# Patient Record
Sex: FEMALE | Race: BLACK OR AFRICAN AMERICAN | NOT HISPANIC OR LATINO | Employment: FULL TIME | URBAN - METROPOLITAN AREA
[De-identification: names, ages, dates, MRNs, and addresses within clinical notes are randomized per-mention and may not be internally consistent; named-entity substitution may affect disease eponyms.]

---

## 2017-03-10 ENCOUNTER — LAB VISIT (OUTPATIENT)
Dept: LAB | Facility: HOSPITAL | Age: 47
End: 2017-03-10
Attending: FAMILY MEDICINE
Payer: COMMERCIAL

## 2017-03-10 ENCOUNTER — OFFICE VISIT (OUTPATIENT)
Dept: FAMILY MEDICINE | Facility: CLINIC | Age: 47
End: 2017-03-10
Payer: COMMERCIAL

## 2017-03-10 VITALS
RESPIRATION RATE: 18 BRPM | OXYGEN SATURATION: 98 % | WEIGHT: 293 LBS | HEART RATE: 88 BPM | HEIGHT: 65 IN | SYSTOLIC BLOOD PRESSURE: 128 MMHG | DIASTOLIC BLOOD PRESSURE: 86 MMHG | BODY MASS INDEX: 48.82 KG/M2 | TEMPERATURE: 97 F

## 2017-03-10 DIAGNOSIS — M79.18 MUSCULOSKELETAL PAIN: ICD-10-CM

## 2017-03-10 DIAGNOSIS — I10 ESSENTIAL HYPERTENSION: ICD-10-CM

## 2017-03-10 DIAGNOSIS — Z86.711 HISTORY OF PULMONARY EMBOLISM: ICD-10-CM

## 2017-03-10 DIAGNOSIS — Z00.00 ANNUAL PHYSICAL EXAM: Primary | ICD-10-CM

## 2017-03-10 DIAGNOSIS — E66.01 MORBID OBESITY WITH BMI OF 50.0-59.9, ADULT: ICD-10-CM

## 2017-03-10 DIAGNOSIS — M47.9 ARTHRITIS OF BACK: ICD-10-CM

## 2017-03-10 DIAGNOSIS — Z00.00 ANNUAL PHYSICAL EXAM: ICD-10-CM

## 2017-03-10 DIAGNOSIS — D50.9 IRON DEFICIENCY ANEMIA, UNSPECIFIED IRON DEFICIENCY ANEMIA TYPE: ICD-10-CM

## 2017-03-10 LAB
25(OH)D3+25(OH)D2 SERPL-MCNC: <8 NG/ML
ALBUMIN SERPL BCP-MCNC: 3 G/DL
ALP SERPL-CCNC: 82 U/L
ALT SERPL W/O P-5'-P-CCNC: 11 U/L
ANION GAP SERPL CALC-SCNC: 10 MMOL/L
AST SERPL-CCNC: 14 U/L
BASOPHILS # BLD AUTO: 0.01 K/UL
BASOPHILS NFR BLD: 0.2 %
BILIRUB SERPL-MCNC: 0.3 MG/DL
BILIRUB UR QL STRIP: NEGATIVE
BUN SERPL-MCNC: 10 MG/DL
CALCIUM SERPL-MCNC: 9.7 MG/DL
CHLORIDE SERPL-SCNC: 105 MMOL/L
CHOLEST/HDLC SERPL: 3.1 {RATIO}
CLARITY UR REFRACT.AUTO: CLEAR
CO2 SERPL-SCNC: 26 MMOL/L
COLOR UR AUTO: YELLOW
CREAT SERPL-MCNC: 0.8 MG/DL
DIFFERENTIAL METHOD: ABNORMAL
EOSINOPHIL # BLD AUTO: 0 K/UL
EOSINOPHIL NFR BLD: 0.5 %
ERYTHROCYTE [DISTWIDTH] IN BLOOD BY AUTOMATED COUNT: 16.1 %
EST. GFR  (AFRICAN AMERICAN): >60 ML/MIN/1.73 M^2
EST. GFR  (NON AFRICAN AMERICAN): >60 ML/MIN/1.73 M^2
FERRITIN SERPL-MCNC: 34 NG/ML
GLUCOSE SERPL-MCNC: 73 MG/DL
GLUCOSE UR QL STRIP: NEGATIVE
HCT VFR BLD AUTO: 38.4 %
HDL/CHOLESTEROL RATIO: 32.2 %
HDLC SERPL-MCNC: 171 MG/DL
HDLC SERPL-MCNC: 55 MG/DL
HGB BLD-MCNC: 11.7 G/DL
HGB UR QL STRIP: NEGATIVE
KETONES UR QL STRIP: NEGATIVE
LDLC SERPL CALC-MCNC: 103.2 MG/DL
LEUKOCYTE ESTERASE UR QL STRIP: NEGATIVE
LYMPHOCYTES # BLD AUTO: 1.7 K/UL
LYMPHOCYTES NFR BLD: 30.6 %
MCH RBC QN AUTO: 26.3 PG
MCHC RBC AUTO-ENTMCNC: 30.5 %
MCV RBC AUTO: 86 FL
MONOCYTES # BLD AUTO: 0.5 K/UL
MONOCYTES NFR BLD: 8.3 %
NEUTROPHILS # BLD AUTO: 3.4 K/UL
NEUTROPHILS NFR BLD: 60.2 %
NITRITE UR QL STRIP: NEGATIVE
NONHDLC SERPL-MCNC: 116 MG/DL
PH UR STRIP: 6 [PH] (ref 5–8)
PLATELET # BLD AUTO: 436 K/UL
PMV BLD AUTO: 10.5 FL
POTASSIUM SERPL-SCNC: 4.3 MMOL/L
PROT SERPL-MCNC: 8 G/DL
PROT UR QL STRIP: NEGATIVE
RBC # BLD AUTO: 4.45 M/UL
SODIUM SERPL-SCNC: 141 MMOL/L
SP GR UR STRIP: 1.01 (ref 1–1.03)
TRIGL SERPL-MCNC: 64 MG/DL
TSH SERPL DL<=0.005 MIU/L-ACNC: 1.09 UIU/ML
URN SPEC COLLECT METH UR: NORMAL
UROBILINOGEN UR STRIP-ACNC: NEGATIVE EU/DL
WBC # BLD AUTO: 5.56 K/UL

## 2017-03-10 PROCEDURE — 3074F SYST BP LT 130 MM HG: CPT | Mod: S$GLB,,, | Performed by: FAMILY MEDICINE

## 2017-03-10 PROCEDURE — 99999 PR PBB SHADOW E&M-EST. PATIENT-LVL III: CPT | Mod: PBBFAC,,, | Performed by: FAMILY MEDICINE

## 2017-03-10 PROCEDURE — 84443 ASSAY THYROID STIM HORMONE: CPT

## 2017-03-10 PROCEDURE — 81003 URINALYSIS AUTO W/O SCOPE: CPT

## 2017-03-10 PROCEDURE — 82728 ASSAY OF FERRITIN: CPT

## 2017-03-10 PROCEDURE — 85025 COMPLETE CBC W/AUTO DIFF WBC: CPT

## 2017-03-10 PROCEDURE — 82306 VITAMIN D 25 HYDROXY: CPT

## 2017-03-10 PROCEDURE — 36415 COLL VENOUS BLD VENIPUNCTURE: CPT | Mod: PO

## 2017-03-10 PROCEDURE — 3079F DIAST BP 80-89 MM HG: CPT | Mod: S$GLB,,, | Performed by: FAMILY MEDICINE

## 2017-03-10 PROCEDURE — 80053 COMPREHEN METABOLIC PANEL: CPT

## 2017-03-10 PROCEDURE — 99396 PREV VISIT EST AGE 40-64: CPT | Mod: S$GLB,,, | Performed by: FAMILY MEDICINE

## 2017-03-10 PROCEDURE — 80061 LIPID PANEL: CPT

## 2017-03-10 NOTE — PROGRESS NOTES
HISTORY OF PRESENT ILLNESS: Ms. Ovalle comes in today fasting and with taking medications for annual wellness examination.     END OF LIFE DECISION: She has a living will and does desire life support.     Current Outpatient Prescriptions   Medication Sig    aspirin (ECOTRIN) 81 MG EC tablet Take 81 mg by mouth once daily.    cetirizine (ZYRTEC) 10 MG tablet Take 10 mg by mouth daily as needed.     FE C 100-250 mg Tab TAKE 1 TABLET BY MOUTH ONCE DAILY    fluticasone (FLONASE) 50 mcg/actuation nasal spray 2 sprays by Each Nare route once daily. (Patient taking differently: 2 sprays by Each Nare route daily as needed. )    ibuprofen (ADVIL,MOTRIN) 800 MG tablet Take 1 tablet (800 mg total) by mouth every 8 (eight) hours as needed for Pain. For 12 days    losartan (COZAAR) 50 MG tablet Take 1 tablet (50 mg total) by mouth once daily.     SCREENINGS:   Cholesterol: March 10, 2016.  FFS/Colonoscopy: Never.  Mammogram: April 2016 with GYN Dr. Kiesha henrandez per patient. Scheduled for April 2017.  GYN Exam: April 2016 with GYN Dr. Kiesha hernandez per patient. Scheduled for April 2017.  Dexa Scan: Never.  Eye Exam: February 2017 with Dr. Jones per patient. She wears glasses.  PPD: Negative in the past per patient.  Immunizations: Tdap - March 4, 2015.  Gardasil - N./A.  Zostavax - N./A.  Pneumovax - In the past.  Seasonal Flu - November 8, 2016.     ROS:  GENERAL: Denies fever, chills or unusual weight change. Appetite normal. Exercises little but reports cutting back on eating as busy. Weight 156.9 kg (345 lb 14.4 oz) at November 8, 2016 visit. Reports always with fatigue but reports some non-compliance with iron therapy and works a lot of hours.  SKIN: Denies rashes, itching, changes in mole, color or texture of skin or easy bruising.  HEAD: Denies headaches or recent head trauma.  EYES: Denies change in vision, pain, diplopia, redness or discharge. Wears glasses.  EARS: Denies ear pain, discharge, vertigo or  "decreased hearing.  NOSE: Denies loss of smell, epistaxis or rhinitis.  MOUTH & THROAT: Denies hoarseness or change in voice. Denies excessive gum bleeding or mouth sores. Denies sore throat. Except reports chapped lips but recently used new lipstick; uses Neosporin lip balm with help.  NODES: Denies swollen glands.  CHEST: Denies ROSARIO, wheezing, cough or sputum production. Reports no longer follows with Dr. Mata, pulmonologist, for surveillance of pulmonary embolism and last saw him 2013.   CARDIOVASCULAR: Denies chest pain, PND, orthopnea or reduced exercise tolerance. Reports rare palpitations but reports cutting back some on caffeine.  ABDOMEN: Denies unusual diarrhea (as reports occasionally following gall bladder removal > 10 years ago), constipation, nausea, vomiting, abdominal pain, or blood in stool.  URINARY: Denies flank pain, dysuria or hematuria.  GENITOURINARY: Denies flank pain, dysuria, frequency or hematuria. No change in menses. Occasionally performs monthly breast self examination.   ENDOCRINE: Denies diabetes, thyroid or cholesterol problems.  HEME/LYMPH: Denies bleeding problems. Follows with Dr. Pascual, hematologist, prn with last visit > 1 year ago for surveillance of Lupus anticoagulant disorder; she is now only on EC ASA 81 mg daily since Xarelto was discontinued April 2014.  PERIPHERAL VASCULAR:Denies claudication or cyanosis.  MUSCULOSKELETAL: Denies joint stiffness, pain or swelling except reports intermittent non-traumatic pain at both of knees and at back but reports back pain helped with massages. Denies edema.  NEUROLOGIC: Denies history of seizures, tremors, paralysis, alteration of gait or coordination.  PSYCHIATRIC: Denies mood swings, depression, anxiety, homicidal or suicidal thoughts. Denies sleep problems.    PE:   VS: /86 (BP Location: Left arm, Patient Position: Sitting, BP Method: Manual)  Pulse 88  Temp 97.1 °F (36.2 °C) (Tympanic)   Resp 18  Ht 5' 5" (1.651 " m)  Wt (!) 154.5 kg (340 lb 9.8 oz)  LMP 03/03/2017 (Approximate) Comment: Monthly  SpO2 98%  BMI 56.68 kg/m2  APPEARANCE: Well nourished, well developed female, obese and pleasant, alert and oriented in no acute distress.   HEAD: Non tender. Full range of motion.  EYES: PERRL, conjunctiva pink, lids no edema.  EARS: External canal patent, no swelling or redness. TM's shiny and clear.  NOSE: Mucosa and turbinates pink, not swollen. No discharge. Non tender sinuses.  THROAT: No pharyngeal erythema or exudate. No stridor.   NECK: Supple, no mass, thyroid not enlarged.  NODES: No cervical lymph node enlargement.  CHEST: Normal respiratory effort. Lungs clear to auscultation.  CARDIOVASCULAR: Normal S1, S2. No rubs, murmurs or gallops. PMI not displaced. No carotid bruit. Pedal pulses palpable bilaterally. No edema.  ABDOMEN: Bowel sounds present. Not distended. Soft. No tenderness, masses or organomegaly.  BREAST EXAM: Not examined but deferred to GYN.  PELVIC EXAM: Not examined but deferred to GYN.  RECTAL EXAM: Not examined.  MUSCULOSKELETAL: No joint deformities or stiffness. She is ambulatory without problems.   SKIN: No rashes or suspicious lesions, normal color and turgor.  NEUROLOGIC: Cranial Nerves: II-XII grossly intact. DTR's: Knees, Ankles 2+ and equal bilaterally. Gait & Posture: Normal gait and fine motion.    ASSESSMENT:    ICD-10-CM ICD-9-CM    1. Annual physical exam Z00.00 V70.0 CBC auto differential      TSH      Urinalysis      Comprehensive metabolic panel      Lipid panel      Ferritin      Vitamin D   2. Essential hypertension I10 401.9    3. Iron deficiency anemia, unspecified iron deficiency anemia type D50.9 280.9    4. Arthritis of back M47.9 721.90    5. Musculoskeletal pain M79.1 729.1    6. History of pulmonary embolism Z86.711 V12.55    7. Morbid obesity with BMI of 50.0-59.9, adult E66.01 278.01     Z68.43 V85.43        PLAN:  1. Age-appropriate counseling-appropriate low-sodium,  low-cholesterol, low carbohydrate diet and exercise daily, monthly breast self exam, annual wellness examination.  2. Patient advised to correspond via My Chart for result.  3. Continue current medications.  4. Reassurance regarding pains; continue current symptomatic treatment.  5. See me in 6 months for hypertension follow up.

## 2017-03-10 NOTE — MR AVS SNAPSHOT
[facility]  [address]  [place] [address]  Phone: [phone]                  Jenna Ovalle   3/10/2017 7:30 AM   Office Visit    Description:  Female : 1970   Provider:  Franca Richmond MD   Department:  [facility]           Reason for Visit     Annual Exam           Diagnoses this Visit        Comments    Annual physical exam    -  Primary     Essential hypertension         Iron deficiency anemia, unspecified iron deficiency anemia type         Arthritis of back         Musculoskeletal pain         History of pulmonary embolism         Morbid obesity with BMI of 50.0-59.9, adult                To Do List           Future Appointments        Provider Department Dept Phone    3/10/2017 8:40 AM LABORATORY, [address] [facility] [phone]    2017 7:30 AM Franca Richmond MD [facility] [phone]      Goals (5 Years of Data)     None      Follow-Up and Disposition     Return in about 6 months (around 2017) for blood pressure follow up.      [facility]     [facility] Nurse Care Line -  Assistance  Registered nurses in the [facility] provide clinical advisement, health education, appointment booking, and other advisory services.  Call for this free service at [phone].             Medications           Message regarding Medications     Verify the changes and/or additions to your medication regime listed below are the same as discussed with your clinician today.  If any of these changes or additions are incorrect, please notify your healthcare provider.             Verify that the below list of medications is an accurate representation of the medications you are currently taking.  If none reported, the list may be blank. If incorrect, please contact your healthcare provider. Carry this list with you in case of emergency.           Current  "Medications     aspirin (ECOTRIN) 81 MG EC tablet Take 81 mg by mouth once daily.    cetirizine (ZYRTEC) 10 MG tablet Take 10 mg by mouth daily as needed.     FE C 100-250 mg Tab TAKE 1 TABLET BY MOUTH ONCE DAILY    fluticasone (FLONASE) 50 mcg/actuation nasal spray 2 sprays by Each Nare route once daily.    ibuprofen (ADVIL,MOTRIN) 800 MG tablet Take 1 tablet (800 mg total) by mouth every 8 (eight) hours as needed for Pain. For 12 days    losartan (COZAAR) 50 MG tablet Take 1 tablet (50 mg total) by mouth once daily.           Clinical Reference Information           Your Vitals Were     BP Pulse Temp Resp Height    128/86 (BP Location: Left arm, Patient Position: Sitting, BP Method: Manual) 88 97.1 °F (36.2 °C) (Tympanic) 18 5' 5" (1.651 m)    Weight Last Period SpO2 BMI    154.5 kg (340 lb 9.8 oz) 03/03/2017 (Approximate) 98% 56.68 kg/m2      Blood Pressure          Most Recent Value    BP  128/86      Allergies as of 3/10/2017     No Known Drug Allergies      Immunizations Administered on Date of Encounter - 3/10/2017     None      Orders Placed During Today's Visit      Normal Orders This Visit    Urinalysis     Future Labs/Procedures Expected by Expires    CBC auto differential  3/10/2017 5/9/2018    Comprehensive metabolic panel  3/10/2017 5/9/2018    Ferritin  3/10/2017 5/9/2018    Lipid panel  3/10/2017 5/9/2018    TSH  3/10/2017 5/9/2018    Vitamin D  3/10/2017 5/9/2018      Instructions    Please correspond with Dr. Richmond via My Chart for your results.     Thanks.       Language Assistance Services     ATTENTION: Language assistance services are available, free of charge. Please call [phone].      ATENCIÓN: Si habla español, tiene a lara disposición servicios gratuitos de asistencia lingüística. Llame al [phone].     CHÚ Ý: N?u b?n nói Ti?ng Vi?t, có các d?ch v? h? tr? ngôn ng? mi?n phí dành cho b?n. G?i s? [phone].         [facility] complies with applicable Federal " civil rights laws and does not discriminate on the basis of race, color, national origin, age, disability, or sex.

## 2017-03-20 ENCOUNTER — PATIENT MESSAGE (OUTPATIENT)
Dept: FAMILY MEDICINE | Facility: CLINIC | Age: 47
End: 2017-03-20

## 2017-03-21 RX ORDER — ERGOCALCIFEROL 1.25 MG/1
50000 CAPSULE ORAL
Qty: 4 CAPSULE | Refills: 2 | Status: SHIPPED | OUTPATIENT
Start: 2017-03-21 | End: 2017-07-03

## 2017-04-04 ENCOUNTER — OFFICE VISIT (OUTPATIENT)
Dept: FAMILY MEDICINE | Facility: CLINIC | Age: 47
End: 2017-04-04
Payer: COMMERCIAL

## 2017-04-04 VITALS
HEIGHT: 65 IN | WEIGHT: 293 LBS | BODY MASS INDEX: 48.82 KG/M2 | SYSTOLIC BLOOD PRESSURE: 132 MMHG | OXYGEN SATURATION: 96 % | HEART RATE: 101 BPM | DIASTOLIC BLOOD PRESSURE: 78 MMHG | TEMPERATURE: 98 F | RESPIRATION RATE: 18 BRPM

## 2017-04-04 DIAGNOSIS — J30.9 ALLERGIC RHINITIS, UNSPECIFIED: Primary | ICD-10-CM

## 2017-04-04 PROCEDURE — 3075F SYST BP GE 130 - 139MM HG: CPT | Mod: S$GLB,,, | Performed by: REGISTERED NURSE

## 2017-04-04 PROCEDURE — 99999 PR PBB SHADOW E&M-EST. PATIENT-LVL IV: CPT | Mod: PBBFAC,,, | Performed by: REGISTERED NURSE

## 2017-04-04 PROCEDURE — 3078F DIAST BP <80 MM HG: CPT | Mod: S$GLB,,, | Performed by: REGISTERED NURSE

## 2017-04-04 PROCEDURE — 99213 OFFICE O/P EST LOW 20 MIN: CPT | Mod: 25,S$GLB,, | Performed by: REGISTERED NURSE

## 2017-04-04 PROCEDURE — 1160F RVW MEDS BY RX/DR IN RCRD: CPT | Mod: S$GLB,,, | Performed by: REGISTERED NURSE

## 2017-04-04 PROCEDURE — 96372 THER/PROPH/DIAG INJ SC/IM: CPT | Mod: S$GLB,,, | Performed by: REGISTERED NURSE

## 2017-04-04 RX ORDER — BETAMETHASONE SODIUM PHOSPHATE AND BETAMETHASONE ACETATE 3; 3 MG/ML; MG/ML
12 INJECTION, SUSPENSION INTRA-ARTICULAR; INTRALESIONAL; INTRAMUSCULAR; SOFT TISSUE
Status: COMPLETED | OUTPATIENT
Start: 2017-04-04 | End: 2017-04-04

## 2017-04-04 RX ORDER — PROMETHAZINE HYDROCHLORIDE AND CODEINE PHOSPHATE 6.25; 1 MG/5ML; MG/5ML
5 SOLUTION ORAL NIGHTLY PRN
Qty: 118 ML | Refills: 0 | Status: SHIPPED | OUTPATIENT
Start: 2017-04-04 | End: 2017-07-03

## 2017-04-04 RX ADMIN — BETAMETHASONE SODIUM PHOSPHATE AND BETAMETHASONE ACETATE 12 MG: 3; 3 INJECTION, SUSPENSION INTRA-ARTICULAR; INTRALESIONAL; INTRAMUSCULAR; SOFT TISSUE at 05:04

## 2017-04-04 NOTE — MR AVS SNAPSHOT
[facility]  [address] [address]  Phone: [phone]                  Jenna Ovalle   2017 4:45 PM   Office Visit    Description:  Female : 1970   Provider:  Ankur De Leon NP   Department:  [facility]           Reason for Visit     Sinus Problem           Diagnoses this Visit        Comments    Allergic rhinitis, unspecified    -  Primary            To Do List           Future Appointments        Provider Department Dept Phone    2017 7:30 AM Franca Richmond MD [facility] [phone]      Goals (5 Years of Data)     None       These Medications        Disp Refills Start End    promethazine-codeine 6.25-10 mg/5 ml (PHENERGAN WITH CODEINE) 6.25-10 mg/5 mL syrup 118 mL 0 2017     Take 5 mLs by mouth nightly as needed for Cough. - Oral    Pharmacy: [facility] [address] [place] [address] RAI [address] AT [facility] Ph #: [phone]         [facility] On Call     [facility] On Call Nurse Care Line - 24 Assistance  Unless otherwise directed by your provider, please contact [facility], our nurse care line that is available for  assistance.     Registered nurses in the [facility] provide: appointment scheduling, clinical advisement, health education, and other advisory services.  Call: [phone] (toll free)               Medications           Message regarding Medications     Verify the changes and/or additions to your medication regime listed below are the same as discussed with your clinician today.  If any of these changes or additions are incorrect, please notify your healthcare provider.        START taking these NEW medications        Refills    promethazine-codeine 6.25-10 mg/5 ml (PHENERGAN WITH CODEINE) 6.25-10 mg/5 mL syrup 0    Sig: Take 5 mLs by mouth nightly as needed for Cough.    Class: Print    Route: Oral      These  "medications were administered today        Dose Freq    betamethasone acetate-betamethasone sodium phosphate injection 12 mg 12 mg Clinic/HOD 1 time    Sig: Inject 2 mLs (12 mg total) into the muscle one time.    Class: Normal    Route: Intramuscular           Verify that the below list of medications is an accurate representation of the medications you are currently taking.  If none reported, the list may be blank. If incorrect, please contact your healthcare provider. Carry this list with you in case of emergency.           Current Medications     aspirin (ECOTRIN) 81 MG EC tablet Take 81 mg by mouth once daily.    cetirizine (ZYRTEC) 10 MG tablet Take 10 mg by mouth daily as needed.     ergocalciferol (ERGOCALCIFEROL) 50,000 unit Cap Take 1 capsule (50,000 Units total) by mouth every 7 days.    FE C 100-250 mg Tab TAKE 1 TABLET BY MOUTH ONCE DAILY    fluticasone (FLONASE) 50 mcg/actuation nasal spray 2 sprays by Each Nare route once daily.    ibuprofen (ADVIL,MOTRIN) 800 MG tablet Take 1 tablet (800 mg total) by mouth every 8 (eight) hours as needed for Pain. For 12 days    losartan (COZAAR) 50 MG tablet Take 1 tablet (50 mg total) by mouth once daily.    promethazine-codeine 6.25-10 mg/5 ml (PHENERGAN WITH CODEINE) 6.25-10 mg/5 mL syrup Take 5 mLs by mouth nightly as needed for Cough.           Clinical Reference Information           Your Vitals Were     BP Pulse Temp Resp Height    132/78 (BP Location: Left arm, Patient Position: Sitting, BP Method: Manual) 101 98.2 °F (36.8 °C) (Tympanic) 18 5' 5" (1.651 m)    Weight Last Period SpO2 BMI    155.7 kg (343 lb 4.1 oz) 03/03/2017 (Approximate) 96% 57.12 kg/m2      Blood Pressure          Most Recent Value    BP  132/78      Allergies as of 4/4/2017     No Known Drug Allergies      Immunizations Administered on Date of Encounter - 4/4/2017     None      Language Assistance Services     ATTENTION: Language assistance services are available, free of charge. Please " call [phone].      ATENCIÓN: Si habla español, tiene a lara disposición servicios gratuitos de asistencia lingüística. Llame al [phone].     CHÚ Ý: N?u b?n nói Ti?ng Vi?t, có các d?ch v? h? tr? ngôn ng? mi?n phí dành cho b?n. G?i s? [phone].         [facility] complies with applicable Federal civil rights laws and does not discriminate on the basis of race, color, national origin, age, disability, or sex.

## 2017-04-05 NOTE — PROGRESS NOTES
"Subjective:       Patient ID: Jenna Ovalle is a 46 y.o. female.    Chief Complaint: Sinus Problem    HPI     Jenna is here today with c/o sinus/allergy issues x 4 days.  She has been taking Zyrtec-D, Flonase and Tussin.  She is monitoring BP on Sudafed.  Reports dry cough (worse PM), sneezing, PND, and HA.    Review of Systems   Constitutional: Negative for chills and fever.   HENT: Positive for congestion, postnasal drip, rhinorrhea and sneezing. Negative for sinus pressure.    Eyes: Negative.    Respiratory: Positive for cough. Negative for shortness of breath and wheezing.    Cardiovascular: Negative.    Allergic/Immunologic: Positive for environmental allergies.   Neurological: Positive for headaches. Negative for weakness, light-headedness and numbness.   Hematological: Negative for adenopathy.       Objective:       Vitals:    04/04/17 1655   BP: 132/78   BP Location: Left arm   Patient Position: Sitting   BP Method: Manual   Pulse: 101   Resp: 18   Temp: 98.2 °F (36.8 °C)   TempSrc: Tympanic   SpO2: 96%   Weight: (!) 155.7 kg (343 lb 4.1 oz)   Height: 5' 5" (1.651 m)       Physical Exam   Constitutional: She is oriented to person, place, and time. She appears well-developed and well-nourished.   HENT:   Head: Normocephalic and atraumatic.   Right Ear: Tympanic membrane, external ear and ear canal normal.   Left Ear: Tympanic membrane, external ear and ear canal normal.   Nose: Mucosal edema and rhinorrhea (boggy, clear RN) present. No epistaxis. Right sinus exhibits no maxillary sinus tenderness and no frontal sinus tenderness. Left sinus exhibits no maxillary sinus tenderness and no frontal sinus tenderness.   Mouth/Throat: Mucous membranes are normal. No oropharyngeal exudate, posterior oropharyngeal edema or posterior oropharyngeal erythema (clear PND noted).   Eyes: Conjunctivae are normal. Pupils are equal, round, and reactive to light. Right eye exhibits no discharge. Left eye exhibits no " discharge. No scleral icterus.   Cardiovascular: Normal rate, regular rhythm and normal heart sounds.    Pulmonary/Chest: Effort normal and breath sounds normal.   Lymphadenopathy:     She has no cervical adenopathy.   Neurological: She is alert and oriented to person, place, and time.   Vitals reviewed.      Assessment:       1. Allergic rhinitis, unspecified        Plan:       Jenna was seen today for sinus problem.    Diagnoses and all orders for this visit:    Allergic rhinitis, unspecified  -     betamethasone acetate-betamethasone sodium phosphate injection 12 mg; Inject 2 mLs (12 mg total) into the muscle one time.  -     promethazine-codeine 6.25-10 mg/5 ml (PHENERGAN WITH CODEINE) 6.25-10 mg/5 mL syrup; Take 5 mLs by mouth nightly as needed for Cough.        May continue Zyrtec-D if feels effective with close BP checks.  Symptomatic care, rest, fluids, hydration.  Follow-up in clinic as needed.

## 2017-04-17 RX ORDER — IRON,CARBONYL/ASCORBIC ACID 100-250 MG
TABLET ORAL
Qty: 30 TABLET | Refills: 5 | Status: SHIPPED | OUTPATIENT
Start: 2017-04-17 | End: 2017-12-08 | Stop reason: SDUPTHER

## 2017-04-19 RX ORDER — LOSARTAN POTASSIUM 50 MG/1
50 TABLET ORAL DAILY
Qty: 90 TABLET | Refills: 1 | Status: SHIPPED | OUTPATIENT
Start: 2017-04-19 | End: 2017-10-14 | Stop reason: SDUPTHER

## 2017-07-03 ENCOUNTER — OFFICE VISIT (OUTPATIENT)
Dept: FAMILY MEDICINE | Facility: CLINIC | Age: 47
End: 2017-07-03
Payer: COMMERCIAL

## 2017-07-03 VITALS
BODY MASS INDEX: 48.82 KG/M2 | RESPIRATION RATE: 18 BRPM | DIASTOLIC BLOOD PRESSURE: 79 MMHG | TEMPERATURE: 96 F | HEIGHT: 65 IN | HEART RATE: 55 BPM | SYSTOLIC BLOOD PRESSURE: 137 MMHG | OXYGEN SATURATION: 97 % | WEIGHT: 293 LBS

## 2017-07-03 DIAGNOSIS — N95.1 PERIMENOPAUSAL: ICD-10-CM

## 2017-07-03 DIAGNOSIS — M47.9 ARTHRITIS OF BACK: ICD-10-CM

## 2017-07-03 DIAGNOSIS — R10.9 FLANK PAIN: Primary | ICD-10-CM

## 2017-07-03 LAB
BILIRUB SERPL-MCNC: NEGATIVE MG/DL
BLOOD URINE, POC: 250
COLOR, POC UA: YELLOW
GLUCOSE UR QL STRIP: NEGATIVE
KETONES UR QL STRIP: NEGATIVE
LEUKOCYTE ESTERASE URINE, POC: NEGATIVE
NITRITE, POC UA: NEGATIVE
PH, POC UA: 6
PROTEIN, POC: NEGATIVE
SPECIFIC GRAVITY, POC UA: 1.01
UROBILINOGEN, POC UA: NEGATIVE

## 2017-07-03 PROCEDURE — 87086 URINE CULTURE/COLONY COUNT: CPT

## 2017-07-03 PROCEDURE — 99214 OFFICE O/P EST MOD 30 MIN: CPT | Mod: 25,S$GLB,, | Performed by: FAMILY MEDICINE

## 2017-07-03 PROCEDURE — 81002 URINALYSIS NONAUTO W/O SCOPE: CPT | Mod: S$GLB,,, | Performed by: FAMILY MEDICINE

## 2017-07-03 PROCEDURE — 99999 PR PBB SHADOW E&M-EST. PATIENT-LVL III: CPT | Mod: PBBFAC,,, | Performed by: FAMILY MEDICINE

## 2017-07-03 RX ORDER — ERGOCALCIFEROL 1.25 MG/1
50000 CAPSULE ORAL
Qty: 4 CAPSULE | Refills: 2 | Status: SHIPPED | OUTPATIENT
Start: 2017-07-03 | End: 2017-09-17 | Stop reason: SDUPTHER

## 2017-07-03 NOTE — PROGRESS NOTES
Subjective:       Patient ID: Jenna Ovalle is a 47 y.o. female.    Chief Complaint: Back Pain    Ms. Ovalle comes in today with complaint of having right back/flank pain for few days.  She states pain initially was intermittent but now constant since yesterday.  She reports pain was more so at her back last night.  She states she now has slight discomfort in her left flank and slight right abdominal pain.  She states she takes ibuprofen with help.  She reports pain at 3/10 on pain scale now.  She denies associated trauma, nausea, vomiting, diarrhea, constipation, chest pain, palpitations, leg swelling, shortness of breath, cough, wheezing, urine frequency, dysuria, hematuria, fever, chills, change of appetite.  However, she states she holds her urine when she can't make it to the restroom.  She has no history of UTI.    She reports chronic fatigue.  She states she currently does not take vitamin D weekly for treatment of vitamin D deficiency as she states she does not have refills.    She is perimenopausal but states she noticed slight blood from vaginal area last night without blood noted this morning.  She follows with GYN Dr. Kiesha Ellington and states she had Pap smear and mammogram performed on May 9, 2017.      Current Outpatient Prescriptions:  aspirin (ECOTRIN) 81 MG EC tablet, Take 81 mg by mouth once daily.  cetirizine (ZYRTEC) 10 MG tablet, Take 10 mg by mouth daily as needed.   FE C 100-250 mg Tab, TAKE 1 TABLET BY MOUTH EVERY DAY  fluticasone (FLONASE) 50 mcg/actuation nasal spray, 2 sprays by Each Nare route once daily. (Patient taking differently: 2 sprays by Each Nare route daily as needed. )  ibuprofen (ADVIL,MOTRIN) 800 MG tablet, Take 1 tablet (800 mg total) by mouth every 8 (eight) hours as needed for Pain. For 12 days  losartan (COZAAR) 50 MG tablet, Take 1 tablet (50 mg total) by mouth once daily.    Urine dipstick ordered by me today - 250 + blood, pH 6, specific gravity 1.010, otherwise  unremarkable.        Back Pain   Associated symptoms include abdominal pain. Pertinent negatives include no chest pain, dysuria or fever.     Review of Systems   Constitutional: Positive for fatigue. Negative for appetite change, chills and fever.   Respiratory: Negative for cough, shortness of breath and wheezing.    Cardiovascular: Negative for chest pain, palpitations and leg swelling.   Gastrointestinal: Positive for abdominal pain. Negative for constipation, diarrhea, nausea and vomiting.   Endocrine:        See history of present illness.   Genitourinary: Positive for flank pain. Negative for difficulty urinating, dysuria, frequency and hematuria.   Musculoskeletal: Positive for back pain.       Objective:      Physical Exam   Constitutional: She is oriented to person, place, and time. She appears well-developed and well-nourished. No distress.   Obese and pleasant.   Cardiovascular: Normal rate, regular rhythm, normal heart sounds and intact distal pulses.    No murmur heard.  Pulmonary/Chest: Effort normal and breath sounds normal. No respiratory distress. She has no wheezes.   Abdominal: Soft. Bowel sounds are normal. She exhibits no distension and no mass. There is tenderness. There is no rebound and no guarding.   Slightly tender at left upper, lower, flank quadrants without acute abdomen noted. No CVA tenderness noted.   Musculoskeletal: Normal range of motion. She exhibits tenderness. She exhibits no edema.   She is ambulatory without problems. Slightly tender at mid and left low back areas.   Neurological: She is alert and oriented to person, place, and time.   Skin: No rash noted. She is not diaphoretic.   Psychiatric: She has a normal mood and affect. Her behavior is normal. Judgment and thought content normal.   Vitals reviewed.      Assessment:       1. Flank pain    2. Perimenopausal    3. Arthritis of back        Plan:       1.  Labs:  Urine culture. Patient advised to call for results.  2.   Continue current medications, follow low sodium, low cholesterol, low carb diet, daily walks.  3.  Prescription refill - Vitamin D 50,000 units weekly, #4, 2 refills.  4.  Flu shot this fall.  5.  Keep 9/13/2017 scheduled appointment with me for hypertension follow up with vitamin D deficiency recheck.

## 2017-07-04 LAB
BACTERIA UR CULT: NORMAL
BACTERIA UR CULT: NORMAL

## 2017-07-05 ENCOUNTER — TELEPHONE (OUTPATIENT)
Dept: FAMILY MEDICINE | Facility: CLINIC | Age: 47
End: 2017-07-05

## 2017-07-05 NOTE — TELEPHONE ENCOUNTER
----- Message from Vernell Daley sent at 7/5/2017 11:25 AM CDT -----  Contact: pt  Pt request call concerning lab results, pt can be reached at [phone]///[phone]

## 2017-07-05 NOTE — TELEPHONE ENCOUNTER
Advise pt final urine culture does not show definite infection.  If symptoms persist into next week without help with pain medication, please notify me. Thanks.

## 2017-07-05 NOTE — TELEPHONE ENCOUNTER
Pt notified and verbalized understanding.   Pt states she is going to call her GYN to see if maybe its a fibroid

## 2017-07-12 ENCOUNTER — LAB VISIT (OUTPATIENT)
Dept: LAB | Facility: HOSPITAL | Age: 47
End: 2017-07-12
Attending: INTERNAL MEDICINE
Payer: COMMERCIAL

## 2017-07-12 ENCOUNTER — OFFICE VISIT (OUTPATIENT)
Dept: ENDOCRINOLOGY | Facility: CLINIC | Age: 47
End: 2017-07-12
Payer: COMMERCIAL

## 2017-07-12 VITALS
HEIGHT: 65 IN | SYSTOLIC BLOOD PRESSURE: 128 MMHG | HEART RATE: 63 BPM | WEIGHT: 293 LBS | DIASTOLIC BLOOD PRESSURE: 82 MMHG | BODY MASS INDEX: 48.82 KG/M2 | TEMPERATURE: 98 F

## 2017-07-12 DIAGNOSIS — R73.03 PREDIABETES: ICD-10-CM

## 2017-07-12 DIAGNOSIS — N92.6 IRREGULAR PERIODS/MENSTRUAL CYCLES: ICD-10-CM

## 2017-07-12 DIAGNOSIS — L68.0 HIRSUTISM: Primary | ICD-10-CM

## 2017-07-12 DIAGNOSIS — L68.0 HIRSUTISM: ICD-10-CM

## 2017-07-12 LAB
ALBUMIN SERPL BCP-MCNC: 2.9 G/DL
ALP SERPL-CCNC: 82 U/L
ALT SERPL W/O P-5'-P-CCNC: 12 U/L
ANION GAP SERPL CALC-SCNC: 7 MMOL/L
AST SERPL-CCNC: 12 U/L
BILIRUB SERPL-MCNC: 0.2 MG/DL
BUN SERPL-MCNC: 11 MG/DL
CALCIUM SERPL-MCNC: 9 MG/DL
CHLORIDE SERPL-SCNC: 106 MMOL/L
CO2 SERPL-SCNC: 26 MMOL/L
CREAT SERPL-MCNC: 0.8 MG/DL
DHEA-S SERPL-MCNC: 54.8 UG/DL
EST. GFR  (AFRICAN AMERICAN): >60 ML/MIN/1.73 M^2
EST. GFR  (NON AFRICAN AMERICAN): >60 ML/MIN/1.73 M^2
GLUCOSE SERPL-MCNC: 101 MG/DL
POTASSIUM SERPL-SCNC: 4.4 MMOL/L
PROLACTIN SERPL IA-MCNC: 6.6 NG/ML
PROT SERPL-MCNC: 7.8 G/DL
SODIUM SERPL-SCNC: 139 MMOL/L
TESTOST SERPL-MCNC: 39 NG/DL

## 2017-07-12 PROCEDURE — 83036 HEMOGLOBIN GLYCOSYLATED A1C: CPT

## 2017-07-12 PROCEDURE — 84403 ASSAY OF TOTAL TESTOSTERONE: CPT

## 2017-07-12 PROCEDURE — 84146 ASSAY OF PROLACTIN: CPT

## 2017-07-12 PROCEDURE — 82627 DEHYDROEPIANDROSTERONE: CPT

## 2017-07-12 PROCEDURE — 84402 ASSAY OF FREE TESTOSTERONE: CPT

## 2017-07-12 PROCEDURE — 83498 ASY HYDROXYPROGESTERONE 17-D: CPT

## 2017-07-12 PROCEDURE — 99204 OFFICE O/P NEW MOD 45 MIN: CPT | Mod: S$GLB,,, | Performed by: INTERNAL MEDICINE

## 2017-07-12 PROCEDURE — 80053 COMPREHEN METABOLIC PANEL: CPT

## 2017-07-12 PROCEDURE — 99999 PR PBB SHADOW E&M-EST. PATIENT-LVL III: CPT | Mod: PBBFAC,,, | Performed by: INTERNAL MEDICINE

## 2017-07-12 PROCEDURE — 36415 COLL VENOUS BLD VENIPUNCTURE: CPT | Mod: PO

## 2017-07-12 RX ORDER — LEVOCETIRIZINE DIHYDROCHLORIDE 5 MG/1
TABLET, FILM COATED ORAL DAILY PRN
Refills: 6 | COMMUNITY
Start: 2017-04-05 | End: 2017-09-13

## 2017-07-12 NOTE — PROGRESS NOTES
Subjective:       Patient ID: Jenna Ovalle is a 47 y.o. female.  Patient is new to me    Records were reviewed      Chief Complaint: Hirsutism  Began years ago    Consultation requested by Aaareferral Self    HPI       Had laser rx  For 9 months which helps hair on chin but wants to see if hormonal issues      Saw gyn in May- may be premeripenousal, skipped months with cycle    Last NMP May    12 years ago dx with fibroids- had myomectomy, but returned, may get hysterectomy at end of year,  Periods use to be heavy and lasted 7 ydays, requires iron    Trouble losing weight    Weight stable    c 5.9 May 2016  Hx of Pulm embolism possibly due to BCP    Review of Systems   Constitutional: Positive for fatigue. Negative for appetite change, fever and unexpected weight change.   HENT: Negative for sore throat and trouble swallowing.    Eyes: Negative for visual disturbance.   Respiratory: Negative for shortness of breath and wheezing.    Cardiovascular: Negative for chest pain, palpitations and leg swelling.   Gastrointestinal: Negative for diarrhea, nausea and vomiting.        Has flank pain   Endocrine: Negative for cold intolerance, heat intolerance, polydipsia, polyphagia and polyuria.   Genitourinary: Positive for menstrual problem. Negative for difficulty urinating and dysuria.   Musculoskeletal: Negative for arthralgias and joint swelling.   Skin: Negative for rash.   Neurological: Negative for dizziness, weakness, numbness and headaches.   Psychiatric/Behavioral: Negative for confusion, dysphoric mood and sleep disturbance.       Objective:      Physical Exam   Constitutional: She is oriented to person, place, and time. No distress.   Obese female   HENT:   Head: Normocephalic and atraumatic.   Right Ear: External ear normal.   Left Ear: External ear normal.   Nose: Nose normal.   Mouth/Throat: Oropharynx is clear and moist. No oropharyngeal exudate.   Eyes: Conjunctivae and EOM are normal. Pupils  are equal, round, and reactive to light. No scleral icterus.   Neck: No JVD present. No tracheal deviation present. No thyromegaly present.   Cardiovascular: Normal rate, regular rhythm, normal heart sounds and intact distal pulses.  Exam reveals no gallop and no friction rub.    No murmur heard.  Pulmonary/Chest: Effort normal and breath sounds normal. No respiratory distress. She has no wheezes. She has no rales.   Abdominal: Soft. Bowel sounds are normal. She exhibits no distension and no mass. There is no tenderness. There is no rebound and no guarding. No hernia.   Musculoskeletal: She exhibits no edema or deformity.   Lymphadenopathy:     She has no cervical adenopathy.   Neurological: She is alert and oriented to person, place, and time. She has normal reflexes. No cranial nerve deficit.   Skin: Skin is warm. No rash noted. She is diaphoretic. No erythema.   Psychiatric: She has a normal mood and affect. Her behavior is normal.   Vitals reviewed.        Lab Review:   Office Visit on 07/03/2017   Component Date Value    Color, UA 07/03/2017 yellow     Spec Grav UA 07/03/2017 1.010     pH, UA 07/03/2017 6     WBC, UA 07/03/2017 Negative     Nitrite, UA 07/03/2017 Negative     Protein 07/03/2017 Negative     Glucose, UA 07/03/2017 Negative     Ketones, UA 07/03/2017 Negative     Urobilinogen, UA 07/03/2017 Negative     Bilirubin 07/03/2017 Negative     Blood, UA 07/03/2017 250     Urine Culture, Routine 07/04/2017 Multiple organisms isolated. None in predominance.  Repeat if     Urine Culture, Routine 07/04/2017 clinically necessary.        Assessment:     1. Hirsutism  Testosterone    Testosterone, free    DHEA-sulfate    17-Hydroxyprogesterone    Prolactin   2. Irregular periods/menstrual cycles     3. Prediabetes  Hemoglobin A1c    Comprehensive metabolic panel      Plan:   Hirsutism  -     Testosterone; Future; Expected date: 07/12/2017  -     Testosterone, free; Future; Expected date:  07/12/2017  -     DHEA-sulfate; Future; Expected date: 07/12/2017  -     17-Hydroxyprogesterone; Future; Expected date: 07/12/2017  -     Prolactin; Future; Expected date: 07/12/2017    Irregular periods/menstrual cycles    Prediabetes  -     Hemoglobin A1c; Future; Expected date: 07/12/2017  -     Comprehensive metabolic panel; Future; Expected date: 07/12/2017      Will consider metformin if indicated and spironolacted if elev. Of androgens- avoid pregnancy with spironolactone  BCP contraindicated due to pulm. embolism  Return in about 3 months (around 10/12/2017).      This note is unavailable for viewing online. Certain specialties, including Behavioral Health and Pain Management, are currently not included in the open progress note program. For notes unavailable online, you can request a copy of your medical record.

## 2017-07-13 LAB
ESTIMATED AVG GLUCOSE: 123 MG/DL
HBA1C MFR BLD HPLC: 5.9 %

## 2017-07-14 LAB — TESTOST FREE SERPL-MCNC: 0.6 PG/ML

## 2017-07-17 LAB — 17OHP SERPL-MCNC: 41 NG/DL

## 2017-07-26 ENCOUNTER — PATIENT MESSAGE (OUTPATIENT)
Dept: ENDOCRINOLOGY | Facility: CLINIC | Age: 47
End: 2017-07-26

## 2017-07-26 RX ORDER — METFORMIN HYDROCHLORIDE 500 MG/1
TABLET, EXTENDED RELEASE ORAL
Qty: 60 TABLET | Refills: 3 | Status: SHIPPED | OUTPATIENT
Start: 2017-07-26 | End: 2018-02-07 | Stop reason: SDUPTHER

## 2017-07-31 ENCOUNTER — OFFICE VISIT (OUTPATIENT)
Dept: URGENT CARE | Facility: CLINIC | Age: 47
End: 2017-07-31
Payer: COMMERCIAL

## 2017-07-31 VITALS
WEIGHT: 293 LBS | HEART RATE: 72 BPM | TEMPERATURE: 98 F | HEIGHT: 65 IN | OXYGEN SATURATION: 98 % | SYSTOLIC BLOOD PRESSURE: 120 MMHG | DIASTOLIC BLOOD PRESSURE: 74 MMHG | BODY MASS INDEX: 48.82 KG/M2

## 2017-07-31 DIAGNOSIS — Z86.711 HISTORY OF PULMONARY EMBOLUS (PE): ICD-10-CM

## 2017-07-31 DIAGNOSIS — M25.561 ACUTE PAIN OF RIGHT KNEE: Primary | ICD-10-CM

## 2017-07-31 PROCEDURE — 99999 PR PBB SHADOW E&M-EST. PATIENT-LVL III: CPT | Mod: PBBFAC,,, | Performed by: NURSE PRACTITIONER

## 2017-07-31 PROCEDURE — 99499 UNLISTED E&M SERVICE: CPT | Mod: S$GLB,,, | Performed by: NURSE PRACTITIONER

## 2017-07-31 NOTE — PROGRESS NOTES
"Subjective:      Patient ID: Jenna Ovalle is a 47 y.o. female.    Chief Complaint: Knee Pain    Ms. Ovalle presents to Urgent Care today with complaints of right knee pain and swelling. She has a history of PE and wants to make sure she doesn't have a blood clot. She denies any shortness of breath. She has had swelling to the knee. No redness or warmth of the right lower leg. Pain is to the medial portion of the knee and she is a little tender also in the popliteal region. No recent trauma, however, Ms. Ovalle does admit to increased activity within the last week or so because her sister is getting  in 5 days.       Review of Systems   Respiratory: Negative for shortness of breath.    Cardiovascular: Positive for leg swelling (right). Negative for chest pain.   Musculoskeletal: Positive for arthralgias (right knee).       Objective:   /74   Pulse 72   Temp 97.8 °F (36.6 °C)   Ht 5' 5" (1.651 m)   Wt (!) 156 kg (343 lb 14.7 oz)   LMP 07/02/2017   SpO2 98%   BMI 57.23 kg/m²   Physical Exam   Constitutional:   Full exam deferred due to sending Ms. Ovalle to ER for further workup to rule out DVT.      Assessment:      1. Acute pain of right knee    2. History of pulmonary embolus (PE)       Plan:   Acute pain of right knee    History of pulmonary embolus (PE)    Explained to Ms. Ovalle that we do not have ultrasound here after 5 p.m. And when obtaining ultrasounds from an outpatient standpoint, we have to have them cleared by insurance and scheduled. Offered to have this done tomorrow with the risk of delaying diagnosis and treatment (possibly worsening condition and even death) or having Ms. Ovalle go to the ER tonight to have an ultrasound. She would like to go to the ER to have this done. Requests [facility] ER. Will call report. She declines ambulance transport.    "

## 2017-09-08 ENCOUNTER — NURSE TRIAGE (OUTPATIENT)
Dept: ADMINISTRATIVE | Facility: CLINIC | Age: 47
End: 2017-09-08

## 2017-09-08 NOTE — TELEPHONE ENCOUNTER
Reason for Disposition   Caller has medication question only, adult not sick, and triager answers question    Protocols used: ST MEDICATION QUESTION CALL-A-FAYE [place] skipped taking metformin yesterday because she took zantac and she read that it would interfere with metformin. She wants to take metformin tonight but is wondering if zantac would be out of her system by now. Advised it is safe for her to take her metformin dose tonight at this point. She has no further questions. Please contact caller with any further care advice.

## 2017-09-13 ENCOUNTER — OFFICE VISIT (OUTPATIENT)
Dept: FAMILY MEDICINE | Facility: CLINIC | Age: 47
End: 2017-09-13
Payer: COMMERCIAL

## 2017-09-13 ENCOUNTER — LAB VISIT (OUTPATIENT)
Dept: LAB | Facility: HOSPITAL | Age: 47
End: 2017-09-13
Attending: FAMILY MEDICINE
Payer: COMMERCIAL

## 2017-09-13 VITALS
OXYGEN SATURATION: 98 % | WEIGHT: 293 LBS | SYSTOLIC BLOOD PRESSURE: 126 MMHG | TEMPERATURE: 98 F | HEIGHT: 65 IN | HEART RATE: 77 BPM | DIASTOLIC BLOOD PRESSURE: 78 MMHG | RESPIRATION RATE: 18 BRPM | BODY MASS INDEX: 48.82 KG/M2

## 2017-09-13 DIAGNOSIS — E66.01 MORBID OBESITY WITH BMI OF 50.0-59.9, ADULT: ICD-10-CM

## 2017-09-13 DIAGNOSIS — Z86.711 HISTORY OF PULMONARY EMBOLISM: ICD-10-CM

## 2017-09-13 DIAGNOSIS — M17.10 ARTHRITIS OF KNEE: ICD-10-CM

## 2017-09-13 DIAGNOSIS — I10 ESSENTIAL HYPERTENSION: Primary | ICD-10-CM

## 2017-09-13 DIAGNOSIS — R73.03 PREDIABETES: ICD-10-CM

## 2017-09-13 DIAGNOSIS — I10 ESSENTIAL HYPERTENSION: ICD-10-CM

## 2017-09-13 LAB
ANION GAP SERPL CALC-SCNC: 11 MMOL/L
BUN SERPL-MCNC: 13 MG/DL
CALCIUM SERPL-MCNC: 9.3 MG/DL
CHLORIDE SERPL-SCNC: 104 MMOL/L
CO2 SERPL-SCNC: 25 MMOL/L
CREAT SERPL-MCNC: 0.7 MG/DL
EST. GFR  (AFRICAN AMERICAN): >60 ML/MIN/1.73 M^2
EST. GFR  (NON AFRICAN AMERICAN): >60 ML/MIN/1.73 M^2
GLUCOSE SERPL-MCNC: 97 MG/DL
POTASSIUM SERPL-SCNC: 3.7 MMOL/L
SODIUM SERPL-SCNC: 140 MMOL/L

## 2017-09-13 PROCEDURE — 3074F SYST BP LT 130 MM HG: CPT | Mod: S$GLB,,, | Performed by: FAMILY MEDICINE

## 2017-09-13 PROCEDURE — 99214 OFFICE O/P EST MOD 30 MIN: CPT | Mod: S$GLB,,, | Performed by: FAMILY MEDICINE

## 2017-09-13 PROCEDURE — 3078F DIAST BP <80 MM HG: CPT | Mod: S$GLB,,, | Performed by: FAMILY MEDICINE

## 2017-09-13 PROCEDURE — 3008F BODY MASS INDEX DOCD: CPT | Mod: S$GLB,,, | Performed by: FAMILY MEDICINE

## 2017-09-13 PROCEDURE — 80048 BASIC METABOLIC PNL TOTAL CA: CPT

## 2017-09-13 PROCEDURE — 36415 COLL VENOUS BLD VENIPUNCTURE: CPT | Mod: PO

## 2017-09-13 PROCEDURE — 99999 PR PBB SHADOW E&M-EST. PATIENT-LVL III: CPT | Mod: PBBFAC,,, | Performed by: FAMILY MEDICINE

## 2017-09-13 NOTE — PROGRESS NOTES
Subjective:       Patient ID: Jenna Ovalle is a 47 y.o. female.    Chief Complaint: Hypertension and Follow-up    Ms. Ovalle comes in today for 6-month hypertension follow-up.  She has taken blood pressure medication today.  She states she sometimes monitors her diet but has not been leisurely exercising.    She states she now follows with Dr. Bubba Quiñonez, orthopedist with [facility] with most recent visit on August 17, 2017 at which time she states he told her she has arthritis in her right knee by x-ray and advised she have water physical therapy which she states she has been receiving 2 times a week for 4 sessions.  She states she will have physical therapy for 6 weeks total and on October 5, 2017.  She states water therapy helps for her knee pain.  She states she alternates Motrin with Duexis for pain control.    She saw Dr. Booker, endocrinologist, on July 12, 2017 and was diagnosed with prediabetes and started on metformin 500 mg 2 pills daily with 3-month follow-up advised and scheduled for October 12, 2017.  She reports no problems with metformin therapy.    She follows with Dr. Pascual, hematologist, prn with last visit > 1 to 2 years ago for surveillance of Lupus anticoagulant disorder; she is now only on EC ASA 81 mg daily since Xarelto was discontinued April 2014.    She denies having fever, chills, fatigue, appetite change; shortness of breath, cough, wheezing; chest pain, palpitations, leg swelling; abdominal pain, nausea, vomiting, diarrhea, constipation; unusual urinary symptoms; back pain; headaches; anxiety or depression.    Current Outpatient Prescriptions:  aspirin (ECOTRIN) 81 MG EC tablet, Take 81 mg by mouth once daily.  cetirizine (ZYRTEC) 10 MG tablet, Take 10 mg by mouth daily as needed.   ergocalciferol (ERGOCALCIFEROL) 50,000 unit Cap, Take 1 capsule (50,000 Units total) by mouth every 7 days.  FE C 100-250 mg Tab, TAKE 1 TABLET BY MOUTH EVERY DAY  fluticasone  (FLONASE) 50 mcg/actuation nasal spray, 2 sprays by Each Nare route once daily. (Patient taking differently: 2 sprays by Each Nare route daily as needed. )  IBUPROFEN/FAMOTIDINE (DUEXIS ORAL), Take 800 mg by mouth daily as needed.   losartan (COZAAR) 50 MG tablet, Take 1 tablet (50 mg total) by mouth once daily.  metformin (GLUCOPHAGE-XR) 500 MG 24 hr tablet, Start with one tablet with dinner for one week;2nd week: Add 2nd tablet with dinner. (Patient taking differently: Take 500 mg by mouth 2 (two) times daily with meals. Start with one tablet with dinner for one week;2nd week: Add 2nd tablet with dinner.)    Labs:                     WBC                      5.56                03/10/2017                 HGB                      11.7 (L)            03/10/2017                 HCT                      38.4                03/10/2017                 PLT                      436 (H)             03/10/2017               LDLCALC                  103.2               03/10/2017                          CHOL                     171                 03/10/2017                 TRIG                     64                  03/10/2017                 HDL                      55                  03/10/2017                 ALT                      12                  07/12/2017                 AST                      12                  07/12/2017                 NA                       139                 07/12/2017                 K                        4.4                 07/12/2017                 CL                       106                 07/12/2017                 CREATININE               0.8                 07/12/2017                 BUN                      11                  07/12/2017                 CO2                      26                  07/12/2017                 TSH                      1.092               03/10/2017                 INR                      4.1 (H)             10/07/2010                  HGBA1C                   5.9 (H)             07/12/2017                  Review of Systems   Constitutional: Negative for activity change, appetite change, chills, fatigue and fever.        Weight 155 kg (341 lb 11.4 oz) at July 3, 2017 visit.   Respiratory: Negative for cough, shortness of breath and wheezing.    Cardiovascular: Negative for chest pain, palpitations and leg swelling.   Gastrointestinal: Negative for abdominal pain, constipation, diarrhea, nausea and vomiting.   Endocrine:        See history of present illness.   Genitourinary: Negative for difficulty urinating.   Musculoskeletal: Positive for arthralgias. Negative for back pain.        See history of present illness. Positive for right knee pain.   Neurological: Negative for headaches.   Hematological:        See history of present illness.   Psychiatric/Behavioral: Negative for dysphoric mood. The patient is not nervous/anxious.        Objective:      Physical Exam   Constitutional: She is oriented to person, place, and time. She appears well-developed and well-nourished. No distress.   Obese and pleasant.   Neck: Normal range of motion. Neck supple. No thyromegaly present.   Cardiovascular: Normal rate, regular rhythm, normal heart sounds and intact distal pulses.    No murmur heard.  Pulmonary/Chest: Effort normal and breath sounds normal. No respiratory distress. She has no wheezes.   Abdominal: Soft. Bowel sounds are normal. She exhibits no distension and no mass. There is no tenderness. There is no rebound and no guarding.   Musculoskeletal: Normal range of motion. She exhibits tenderness. She exhibits no edema.   She is ambulatory without problems. Slightly tender at inner right knee with full range of motion noted.   Lymphadenopathy:     She has no cervical adenopathy.   Neurological: She is alert and oriented to person, place, and time.   Skin: She is not diaphoretic.   Psychiatric: She has a normal mood and affect. Her behavior is normal.  Judgment and thought content normal.   Vitals reviewed.      Assessment:       1. Essential hypertension    2. Arthritis of knee    3. Prediabetes    4. History of pulmonary embolism    5. Morbid obesity with BMI of 50.0-59.9, adult        Plan:       1.  Lab: BMP.  Patient correspond via My Chart for results.  2.  Continue current medications, follow low sodium, low cholesterol, low carb diet, daily walks.  3.  Mobility impairment form renewed/completed per patient request.  4.  Keep follow up with specialists.  5.  Flu shot this fall.  6.  See me in March 2018 for fasting annual wellness examination.

## 2017-09-19 RX ORDER — ERGOCALCIFEROL 1.25 MG/1
CAPSULE ORAL
Qty: 4 CAPSULE | Refills: 5 | Status: SHIPPED | OUTPATIENT
Start: 2017-09-19 | End: 2018-03-10 | Stop reason: SDUPTHER

## 2017-10-12 ENCOUNTER — OFFICE VISIT (OUTPATIENT)
Dept: ENDOCRINOLOGY | Facility: CLINIC | Age: 47
End: 2017-10-12
Payer: COMMERCIAL

## 2017-10-12 ENCOUNTER — LAB VISIT (OUTPATIENT)
Dept: LAB | Facility: HOSPITAL | Age: 47
End: 2017-10-12
Attending: INTERNAL MEDICINE
Payer: COMMERCIAL

## 2017-10-12 VITALS
HEIGHT: 65 IN | WEIGHT: 293 LBS | DIASTOLIC BLOOD PRESSURE: 78 MMHG | SYSTOLIC BLOOD PRESSURE: 122 MMHG | HEART RATE: 61 BPM | TEMPERATURE: 96 F | BODY MASS INDEX: 48.82 KG/M2

## 2017-10-12 DIAGNOSIS — E66.01 MORBID OBESITY WITH BMI OF 50.0-59.9, ADULT: ICD-10-CM

## 2017-10-12 DIAGNOSIS — R73.03 PREDIABETES: ICD-10-CM

## 2017-10-12 DIAGNOSIS — R73.03 PREDIABETES: Primary | ICD-10-CM

## 2017-10-12 DIAGNOSIS — L68.0 HIRSUTISM: ICD-10-CM

## 2017-10-12 LAB
ESTIMATED AVG GLUCOSE: 126 MG/DL
HBA1C MFR BLD HPLC: 6 %

## 2017-10-12 PROCEDURE — 99214 OFFICE O/P EST MOD 30 MIN: CPT | Mod: S$GLB,,, | Performed by: INTERNAL MEDICINE

## 2017-10-12 PROCEDURE — 36415 COLL VENOUS BLD VENIPUNCTURE: CPT | Mod: PO

## 2017-10-12 PROCEDURE — 99999 PR PBB SHADOW E&M-EST. PATIENT-LVL III: CPT | Mod: PBBFAC,,, | Performed by: INTERNAL MEDICINE

## 2017-10-12 PROCEDURE — 83036 HEMOGLOBIN GLYCOSYLATED A1C: CPT

## 2017-10-12 NOTE — PROGRESS NOTES
""This note will be shared with the patient" Patient ID: Jenna Ovalle is a 47 y.o. female.  Patient is here for follow up        Chief Complaint: Hirsutism      HPI    Had laser rx  For 9 months which helps hair on chin but wanted to see if hormonal issues-her testosterone and DHEA S were not elevated and prolactin was normal    She was found to be prediabetic with an A1c of 5.9 and I started metformin 500 mg and she is tolerating to a day, weight is down 8 pounds since her last visit      Saw gyn in May- may be premeripenousal, skipped months with cycle    12 years ago dx with fibroids- had myomectomy, but returned, may get hysterectomy at end of year,  Periods use to be heavy and lasted 7 ydays, requires iron      Now taking pool therapy for her chronic knee pain      Loose stools since GB  Removed-this has increased a bit since on metformin but not severe    c 5.9 May 2016  Hx of Pulm embolism possibly due to BCP      I have reviewed the past medical, family and social history    Review of Systems   Constitutional: Negative for appetite change, fatigue, fever and unexpected weight change.   HENT: Negative for sore throat and trouble swallowing.    Eyes: Negative for visual disturbance.   Respiratory: Negative for shortness of breath and wheezing.    Cardiovascular: Negative for chest pain, palpitations and leg swelling.   Gastrointestinal: Negative for diarrhea, nausea and vomiting.   Endocrine: Negative for cold intolerance, heat intolerance, polydipsia, polyphagia and polyuria.   Genitourinary: Negative for difficulty urinating, dysuria and menstrual problem.   Musculoskeletal: Positive for arthralgias. Negative for joint swelling.   Skin: Negative for rash.   Neurological: Negative for dizziness, weakness, numbness and headaches.   Psychiatric/Behavioral: Negative for confusion, dysphoric mood and sleep disturbance.       Objective:      Physical Exam   Constitutional: She is oriented to " person, place, and time. She appears well-developed and well-nourished. No distress.   HENT:   Head: Normocephalic and atraumatic.   Right Ear: External ear normal.   Left Ear: External ear normal.   Eyes: Conjunctivae and EOM are normal. Right eye exhibits discharge. Left eye exhibits no discharge. No scleral icterus.   Cardiovascular: Normal rate.    Pulmonary/Chest: No respiratory distress. She has no wheezes. She has no rales.   Musculoskeletal: She exhibits no edema or deformity.   Neurological: She is alert and oriented to person, place, and time. She has normal reflexes. No cranial nerve deficit.   Skin: Skin is warm. No rash noted. No erythema.   Psychiatric: She has a normal mood and affect. Her behavior is normal.   Vitals reviewed.        Lab Review:   Lab Visit on 09/13/2017   Component Date Value    Sodium 09/13/2017 140     Potassium 09/13/2017 3.7     Chloride 09/13/2017 104     CO2 09/13/2017 25     Glucose 09/13/2017 97     BUN, Bld 09/13/2017 13     Creatinine 09/13/2017 0.7     Calcium 09/13/2017 9.3     Anion Gap 09/13/2017 11     eGFR if African American 09/13/2017 >60.0     eGFR if non African Amer* 09/13/2017 >60.0      Lab Results   Component Value Date    HGBA1C 5.9 (H) 07/12/2017       Assessment:     1. Prediabetes  Hemoglobin A1c    Continue metformin, check A1c today, continue working on healthy lifestyle and exercise   2. Hirsutism      No hormonal cause found, continue with topical treatment/removal   3. Morbid obesity with BMI of 50.0-59.9, adult      Discussed weight loss drugs however her insurance she says doesn't cover, she contemplated weight loss surgery but has insurance issues      Plan:   Prediabetes  Comments:  Continue metformin, check A1c today, continue working on healthy lifestyle and exercise  Orders:  -     Hemoglobin A1c; Future; Expected date: 10/12/2017    Hirsutism  Comments:  No hormonal cause found, continue with topical treatment/removal    Morbid  obesity with BMI of 50.0-59.9, adult  Comments:  Discussed weight loss drugs however her insurance she says doesn't cover, she contemplated weight loss surgery but has insurance issues    A total of 30 minutes were spent face to face with the patient during this encounter and over half of that time was spent on discussing diagnosis, symptoms, and treatment. Also time was spent on counseling and coordination of care.      Return in about 6 months (around 4/12/2018).    Labs prior to appointment? no

## 2017-10-17 RX ORDER — LOSARTAN POTASSIUM 50 MG/1
TABLET ORAL
Qty: 90 TABLET | Refills: 1 | Status: SHIPPED | OUTPATIENT
Start: 2017-10-17 | End: 2018-04-29 | Stop reason: SDUPTHER

## 2017-10-25 ENCOUNTER — PATIENT MESSAGE (OUTPATIENT)
Dept: ENDOCRINOLOGY | Facility: CLINIC | Age: 47
End: 2017-10-25

## 2017-11-30 ENCOUNTER — OFFICE VISIT (OUTPATIENT)
Dept: FAMILY MEDICINE | Facility: CLINIC | Age: 47
End: 2017-11-30
Payer: COMMERCIAL

## 2017-11-30 VITALS
SYSTOLIC BLOOD PRESSURE: 126 MMHG | WEIGHT: 293 LBS | HEART RATE: 98 BPM | BODY MASS INDEX: 48.82 KG/M2 | DIASTOLIC BLOOD PRESSURE: 78 MMHG | RESPIRATION RATE: 17 BRPM | HEIGHT: 65 IN | TEMPERATURE: 98 F | OXYGEN SATURATION: 98 %

## 2017-11-30 DIAGNOSIS — J30.89 ALLERGIC RHINITIS DUE TO OTHER ALLERGIC TRIGGER, UNSPECIFIED CHRONICITY, UNSPECIFIED SEASONALITY: Primary | ICD-10-CM

## 2017-11-30 PROCEDURE — 99999 PR PBB SHADOW E&M-EST. PATIENT-LVL IV: CPT | Mod: PBBFAC,,, | Performed by: REGISTERED NURSE

## 2017-11-30 PROCEDURE — 99213 OFFICE O/P EST LOW 20 MIN: CPT | Mod: S$GLB,,, | Performed by: REGISTERED NURSE

## 2017-11-30 NOTE — PROGRESS NOTES
"Subjective:       Patient ID: Jenna Ovalle is a 47 y.o. female.    Chief Complaint: Sore Throat      HPI    Jenna is here today with c/o illness x 1 to 2 days.  Reports RN, NC, sore throat with mild hoarseness.  Reports dry cough, sneezing and ear pressure.  Started Zyrtec and Vitamin-C for allergies.      Review of Systems   Constitutional: Negative for chills and fever.   HENT: Positive for congestion, ear pain, postnasal drip, rhinorrhea, sneezing, sore throat and voice change. Negative for nosebleeds and sinus pain.    Eyes: Negative.    Respiratory: Positive for cough. Negative for shortness of breath and wheezing.    Cardiovascular: Negative.    Allergic/Immunologic: Positive for environmental allergies.   Neurological: Negative.          Patient Active Problem List   Diagnosis    Hypertension    Menorrhagia    AR (allergic rhinitis)    History of pulmonary embolism    Iron deficiency anemia    Morbid obesity with BMI of 50.0-59.9, adult    Arthritis of back    Prediabetes    Arthritis of knee         Objective:     Vitals:    11/30/17 1637   BP: 126/78   BP Location: Left arm   Patient Position: Sitting   BP Method: Medium (Manual)   Pulse: 98   Resp: 17   Temp: 97.8 °F (36.6 °C)   TempSrc: Tympanic   SpO2: 98%   Weight: (!) 155.9 kg (343 lb 11.2 oz)   Height: 5' 5" (1.651 m)       Physical Exam   Constitutional: She is oriented to person, place, and time. She appears well-developed and well-nourished.   HENT:   Head: Normocephalic and atraumatic.   Right Ear: Tympanic membrane is bulging. Tympanic membrane is not injected, not perforated, not erythematous and not retracted.   Left Ear: Tympanic membrane is bulging. Tympanic membrane is not injected, not perforated, not erythematous and not retracted.   Nose: Mucosal edema and rhinorrhea (boggy, clear RN) present. Right sinus exhibits no maxillary sinus tenderness and no frontal sinus tenderness. Left sinus exhibits no maxillary sinus " tenderness and no frontal sinus tenderness.   Mouth/Throat: Mucous membranes are normal. No oropharyngeal exudate, posterior oropharyngeal edema or posterior oropharyngeal erythema (clear PND noted).   Eyes: Conjunctivae and EOM are normal. Pupils are equal, round, and reactive to light. Right eye exhibits no discharge. Left eye exhibits no discharge.   Cardiovascular: Normal rate, regular rhythm and normal heart sounds.    Pulmonary/Chest: Effort normal and breath sounds normal.   Lymphadenopathy:     She has no cervical adenopathy.   Neurological: She is alert and oriented to person, place, and time.   Vitals reviewed.        Medication List with Changes/Refills   Current Medications    ASPIRIN (ECOTRIN) 81 MG EC TABLET    Take 81 mg by mouth once daily.    CETIRIZINE (ZYRTEC) 10 MG TABLET    Take 10 mg by mouth daily as needed.     ERGOCALCIFEROL (ERGOCALCIFEROL) 50,000 UNIT CAP    TAKE 1 CAPSULE BY MOUTH EVERY 7 DAYS    FE C 100-250 MG TAB    TAKE 1 TABLET BY MOUTH EVERY DAY    FLUTICASONE (FLONASE) 50 MCG/ACTUATION NASAL SPRAY    2 sprays by Each Nare route once daily.    IBUPROFEN/FAMOTIDINE (DUEXIS ORAL)    Take 800 mg by mouth daily as needed.     LOSARTAN (COZAAR) 50 MG TABLET    TAKE 1 TABLET(50 MG) BY MOUTH EVERY DAY    METFORMIN (GLUCOPHAGE-XR) 500 MG 24 HR TABLET    Start with one tablet with dinner for one week;2nd week: Add 2nd tablet with dinner.           Diagnosis       1. Allergic rhinitis due to other allergic trigger, unspecified chronicity, unspecified seasonality          Assessment/ Plan     Allergic rhinitis due to other allergic trigger, unspecified chronicity, unspecified seasonality  · She will start Zyrtec-D --- states that this works better, symptoms usually improve after 2 to 3 days, and does not raise her blood pressure.  · Rest, hydration, fluids.  · Follow-up in clinic as needed.          YAHIR Campbell  [facility]

## 2017-12-06 ENCOUNTER — TELEPHONE (OUTPATIENT)
Dept: FAMILY MEDICINE | Facility: CLINIC | Age: 47
End: 2017-12-06

## 2017-12-06 RX ORDER — PREDNISONE 20 MG/1
TABLET ORAL
Qty: 10 TABLET | Refills: 0 | Status: SHIPPED | OUTPATIENT
Start: 2017-12-06 | End: 2018-03-13

## 2017-12-06 NOTE — TELEPHONE ENCOUNTER
----- Message from Radha Green sent at 12/6/2017  8:26 AM CST -----  Contact: self  Patient need to speak with nurse  Pt want to know if doctor can call in oral steroids discus during appointment last week   Please call pt at [phone] for more detail

## 2017-12-11 RX ORDER — IRON,CARBONYL/ASCORBIC ACID 100-250 MG
1 TABLET ORAL DAILY
Qty: 30 TABLET | Refills: 5 | Status: SHIPPED | OUTPATIENT
Start: 2017-12-11 | End: 2019-03-14

## 2017-12-19 ENCOUNTER — OFFICE VISIT (OUTPATIENT)
Dept: URGENT CARE | Facility: CLINIC | Age: 47
End: 2017-12-19
Payer: COMMERCIAL

## 2017-12-19 VITALS
TEMPERATURE: 100 F | SYSTOLIC BLOOD PRESSURE: 128 MMHG | BODY MASS INDEX: 47.09 KG/M2 | HEART RATE: 97 BPM | HEIGHT: 66 IN | OXYGEN SATURATION: 97 % | WEIGHT: 293 LBS | DIASTOLIC BLOOD PRESSURE: 77 MMHG

## 2017-12-19 DIAGNOSIS — R05.9 COUGH: ICD-10-CM

## 2017-12-19 DIAGNOSIS — J32.9 SINUSITIS, UNSPECIFIED CHRONICITY, UNSPECIFIED LOCATION: Primary | ICD-10-CM

## 2017-12-19 LAB
FLUAV AG SPEC QL IA: POSITIVE
FLUBV AG SPEC QL IA: NEGATIVE
SPECIMEN SOURCE: ABNORMAL

## 2017-12-19 PROCEDURE — 99214 OFFICE O/P EST MOD 30 MIN: CPT | Mod: S$GLB,,, | Performed by: NURSE PRACTITIONER

## 2017-12-19 PROCEDURE — 87400 INFLUENZA A/B EACH AG IA: CPT | Mod: 59,PO

## 2017-12-19 PROCEDURE — 99999 PR PBB SHADOW E&M-EST. PATIENT-LVL IV: CPT | Mod: PBBFAC,,, | Performed by: NURSE PRACTITIONER

## 2017-12-19 RX ORDER — BENZONATATE 100 MG/1
100 CAPSULE ORAL 3 TIMES DAILY PRN
Qty: 30 CAPSULE | Refills: 0 | Status: SHIPPED | OUTPATIENT
Start: 2017-12-19 | End: 2017-12-29

## 2017-12-19 RX ORDER — AMOXICILLIN AND CLAVULANATE POTASSIUM 875; 125 MG/1; MG/1
1 TABLET, FILM COATED ORAL 2 TIMES DAILY
Qty: 20 TABLET | Refills: 0 | Status: SHIPPED | OUTPATIENT
Start: 2017-12-19 | End: 2017-12-29

## 2017-12-19 NOTE — LETTER
December 19, 2017      [facility]  [address] [facility] [address] [place] [address]  Phone: [phone]  Fax: [phone]       Patient: Jenna Ovalle   YOB: 1970  Date of Visit: 12/19/2017    To Whom It May Concern:    Saul Ovalle  was at [facility] on 12/19/2017. She may return to work/school on 12/21/2017 with no restrictions. If you have any questions or concerns, or if I can be of further assistance, please do not hesitate to contact me.    Sincerely,            Mikel Olivares, NP

## 2017-12-20 ENCOUNTER — PATIENT MESSAGE (OUTPATIENT)
Dept: ADMINISTRATIVE | Facility: HOSPITAL | Age: 47
End: 2017-12-20

## 2017-12-20 NOTE — PROGRESS NOTES
"Subjective:       Patient ID: Jenna Ovalle is a 47 y.o. female.    Chief Complaint: Cough ("cough, post nasal drip and flu like symptoms")    Pt is a 47 year old female to clinic today with continued complaints of cough, ST, congestion, PND and HA that began 3 weeks ago. PT states was seen by PCP and was prescribed oral prednisone and zyrtec.       Cough   This is a recurrent problem. The current episode started 1 to 4 weeks ago. The problem has been gradually worsening. The problem occurs every few minutes. The cough is productive of sputum. Associated symptoms include ear congestion, headaches, nasal congestion, postnasal drip, rhinorrhea and a sore throat. Pertinent negatives include no chest pain, chills, ear pain, fever, heartburn, hemoptysis, myalgias, rash, shortness of breath, sweats, weight loss or wheezing. Nothing aggravates the symptoms. Treatments tried: prednisone, zyrtec. The treatment provided mild relief. There is no history of asthma, bronchitis or pneumonia.     Review of Systems   Constitutional: Negative for chills, diaphoresis, fatigue, fever and weight loss.   HENT: Positive for congestion, postnasal drip, rhinorrhea, sinus pressure and sore throat. Negative for ear discharge, ear pain, sinus pain, sneezing and trouble swallowing.    Eyes: Negative for pain.   Respiratory: Positive for cough. Negative for hemoptysis, chest tightness, shortness of breath and wheezing.    Cardiovascular: Negative for chest pain and palpitations.   Gastrointestinal: Negative for abdominal pain, diarrhea, heartburn, nausea and vomiting.   Genitourinary: Negative for dysuria.   Musculoskeletal: Negative for back pain, myalgias and neck pain.   Skin: Negative for rash.   Neurological: Positive for headaches. Negative for dizziness, light-headedness and numbness.       Objective:      Physical Exam   Constitutional: She is oriented to person, place, and time. She appears well-developed and well-nourished. No " distress.   HENT:   Head: Normocephalic.   Right Ear: Tympanic membrane, external ear and ear canal normal. No tenderness. Tympanic membrane is not bulging.   Left Ear: Tympanic membrane, external ear and ear canal normal. No tenderness. Tympanic membrane is not bulging.   Nose: Mucosal edema and rhinorrhea present. Right sinus exhibits frontal sinus tenderness. Right sinus exhibits no maxillary sinus tenderness. Left sinus exhibits frontal sinus tenderness. Left sinus exhibits no maxillary sinus tenderness.   Mouth/Throat: Uvula is midline, oropharynx is clear and moist and mucous membranes are normal. No oropharyngeal exudate, posterior oropharyngeal edema or posterior oropharyngeal erythema. No tonsillar exudate.   Eyes: Conjunctivae and EOM are normal. Pupils are equal, round, and reactive to light.   Neck: Normal range of motion. Neck supple.   Cardiovascular: Normal rate, regular rhythm, S1 normal, S2 normal and normal heart sounds.  Exam reveals no gallop and no friction rub.    No murmur heard.  Pulmonary/Chest: Effort normal and breath sounds normal. No accessory muscle usage. No apnea, no tachypnea and no bradypnea. No respiratory distress. She has no decreased breath sounds. She has no wheezes. She has no rhonchi. She has no rales.   Lymphadenopathy:        Head (right side): No submental, no submandibular and no tonsillar adenopathy present.        Head (left side): No submental, no submandibular and no tonsillar adenopathy present.     She has no cervical adenopathy.   Neurological: She is alert and oriented to person, place, and time.   Skin: Skin is warm and dry. No rash noted. She is not diaphoretic.   Psychiatric: She has a normal mood and affect. Her speech is normal and behavior is normal. Thought content normal.   Nursing note and vitals reviewed.      Assessment:       1. Sinusitis, unspecified chronicity, unspecified location    2. Cough        Plan:   Sinusitis, unspecified chronicity,  unspecified location  -     amoxicillin-clavulanate 875-125mg (AUGMENTIN) 875-125 mg per tablet; Take 1 tablet by mouth 2 (two) times daily.  Dispense: 20 tablet; Refill: 0    Cough  -     benzonatate (TESSALON) 100 MG capsule; Take 1 capsule (100 mg total) by mouth 3 (three) times daily as needed for Cough (Do not take more than 6 capsules in a 24 hour period.).  Dispense: 30 capsule; Refill: 0  -     Influenza antigen Nasopharyngeal Swab      · Rest and increase fluids.   · May apply warm compresses as needed.   · Saline nasal spray or saline irrigation (Neti pot) to loosen nasal congestion.  · Flonase or Nasacort to reduce inflammation in the sinus cavities.  · Take antibiotics exactly as prescribed. Make sure to complete the entire course of antibiotics even if you start feeling better. This will prevent recurrence of your infection and bacterial resistance.   · Do not drive, drink alcohol, or take any other sedating medications or substances while taking cough syrup.   · Follow up with your primary care provider or with ENT if not improved within a few days or sooner for any new or worsening symptoms.   · Go to the ER for any fever that does not improve with Tylenol/Ibuprofen, neck stiffness, rash, severe headache, vision changes, shortness of breath, chest pain, severe facial pain or swelling, or for any other new and concerning symptoms.

## 2018-01-20 ENCOUNTER — NURSE TRIAGE (OUTPATIENT)
Dept: ADMINISTRATIVE | Facility: CLINIC | Age: 48
End: 2018-01-20

## 2018-01-20 NOTE — TELEPHONE ENCOUNTER
"Had root canal tues 1/16- redone after previous root canal. Having more and more pain, on abx. Dentist office not open. Taking 600 mg ibuprofen and 1000mg  TID. Pain is worse. Not lasting full 8 hours. Drinking a lot of water.     Reason for Disposition   [1] SEVERE pain (e.g., excruciating, unable to do any normal activities) AND [2] not improved 2 hours after pain medicine    Answer Assessment - Initial Assessment Questions  1. SYMPTOM: "What's the main symptom you're concerned about?" (e.g., bleeding, pain, fever)    Pain   2. ONSET: "When did the ________  start?"     1/16 1230pm   3. DATE of TOOTH EXTRACTION: "When was surgery performed?"      1/16  4. BLEEDING: "Is there any bleeding?" If so, ask: "How much?"     No   5. PAIN: "Is there any pain?" If so, ask: "How bad is it?"  (Scale 1-10; or mild, moderate, severe)    Rates pain 10 prior to meds- took meds at 4pm 9 now  6. FEVER: "Do you have a fever?" If so, ask: "What is your temperature, how was it measured, and when did it start?"    afeb   7. OTHER SYMPTOMS: "Do you have any other symptoms?" (e.g., face swelling)    No    Protocols used: ST TOOTH [id number]  rec to call dentist. Pt states phone just rings and rings. Follow dentist recs both verbal and written.   rec ED due to severe pain. No CP, no SOB. Pt states that she will try cold pack and may go to ED if no help. Pt notified of no pain meds prescribed by on call MDs. Call back with questions.   "

## 2018-02-07 RX ORDER — METFORMIN HYDROCHLORIDE 500 MG/1
TABLET, EXTENDED RELEASE ORAL
Qty: 60 TABLET | Refills: 0 | Status: SHIPPED | OUTPATIENT
Start: 2018-02-07 | End: 2018-03-15 | Stop reason: SDUPTHER

## 2018-03-12 RX ORDER — ERGOCALCIFEROL 1.25 MG/1
CAPSULE ORAL
Qty: 4 CAPSULE | Refills: 5 | Status: SHIPPED | OUTPATIENT
Start: 2018-03-12 | End: 2018-08-28 | Stop reason: SDUPTHER

## 2018-03-13 ENCOUNTER — LAB VISIT (OUTPATIENT)
Dept: LAB | Facility: HOSPITAL | Age: 48
End: 2018-03-13
Attending: FAMILY MEDICINE
Payer: COMMERCIAL

## 2018-03-13 ENCOUNTER — OFFICE VISIT (OUTPATIENT)
Dept: FAMILY MEDICINE | Facility: CLINIC | Age: 48
End: 2018-03-13
Payer: COMMERCIAL

## 2018-03-13 VITALS
BODY MASS INDEX: 47.09 KG/M2 | HEART RATE: 80 BPM | RESPIRATION RATE: 18 BRPM | WEIGHT: 293 LBS | TEMPERATURE: 96 F | SYSTOLIC BLOOD PRESSURE: 118 MMHG | HEIGHT: 66 IN | OXYGEN SATURATION: 97 % | DIASTOLIC BLOOD PRESSURE: 62 MMHG

## 2018-03-13 DIAGNOSIS — I10 ESSENTIAL HYPERTENSION: ICD-10-CM

## 2018-03-13 DIAGNOSIS — J30.2 SEASONAL ALLERGIC RHINITIS, UNSPECIFIED CHRONICITY, UNSPECIFIED TRIGGER: ICD-10-CM

## 2018-03-13 DIAGNOSIS — D50.9 IRON DEFICIENCY ANEMIA, UNSPECIFIED IRON DEFICIENCY ANEMIA TYPE: ICD-10-CM

## 2018-03-13 DIAGNOSIS — E66.01 MORBID OBESITY WITH BMI OF 50.0-59.9, ADULT: ICD-10-CM

## 2018-03-13 DIAGNOSIS — R73.03 PREDIABETES: ICD-10-CM

## 2018-03-13 DIAGNOSIS — N95.1 PERIMENOPAUSAL: ICD-10-CM

## 2018-03-13 DIAGNOSIS — Z00.00 ANNUAL PHYSICAL EXAM: ICD-10-CM

## 2018-03-13 DIAGNOSIS — E55.9 VITAMIN D DEFICIENCY: ICD-10-CM

## 2018-03-13 DIAGNOSIS — Z86.711 HISTORY OF PULMONARY EMBOLISM: ICD-10-CM

## 2018-03-13 DIAGNOSIS — M17.10 ARTHRITIS OF KNEE: ICD-10-CM

## 2018-03-13 LAB
25(OH)D3+25(OH)D2 SERPL-MCNC: 17 NG/ML
ALBUMIN SERPL BCP-MCNC: 3.2 G/DL
ALP SERPL-CCNC: 71 U/L
ALT SERPL W/O P-5'-P-CCNC: 11 U/L
ANION GAP SERPL CALC-SCNC: 11 MMOL/L
AST SERPL-CCNC: 14 U/L
BASOPHILS # BLD AUTO: 0.02 K/UL
BASOPHILS NFR BLD: 0.4 %
BILIRUB SERPL-MCNC: 0.3 MG/DL
BILIRUB UR QL STRIP: NEGATIVE
BUN SERPL-MCNC: 7 MG/DL
CALCIUM SERPL-MCNC: 9.4 MG/DL
CHLORIDE SERPL-SCNC: 105 MMOL/L
CHOLEST SERPL-MCNC: 195 MG/DL
CHOLEST/HDLC SERPL: 3.1 {RATIO}
CLARITY UR REFRACT.AUTO: CLEAR
CO2 SERPL-SCNC: 24 MMOL/L
COLOR UR AUTO: YELLOW
CREAT SERPL-MCNC: 0.6 MG/DL
DIFFERENTIAL METHOD: ABNORMAL
EOSINOPHIL # BLD AUTO: 0 K/UL
EOSINOPHIL NFR BLD: 0.8 %
ERYTHROCYTE [DISTWIDTH] IN BLOOD BY AUTOMATED COUNT: 15.5 %
EST. GFR  (AFRICAN AMERICAN): >60 ML/MIN/1.73 M^2
EST. GFR  (NON AFRICAN AMERICAN): >60 ML/MIN/1.73 M^2
ESTIMATED AVG GLUCOSE: 111 MG/DL
FERRITIN SERPL-MCNC: 63 NG/ML
GLUCOSE SERPL-MCNC: 87 MG/DL
GLUCOSE UR QL STRIP: NEGATIVE
HBA1C MFR BLD HPLC: 5.5 %
HCT VFR BLD AUTO: 39.4 %
HDLC SERPL-MCNC: 63 MG/DL
HDLC SERPL: 32.3 %
HGB BLD-MCNC: 12.5 G/DL
HGB UR QL STRIP: NEGATIVE
IMM GRANULOCYTES # BLD AUTO: 0.01 K/UL
IMM GRANULOCYTES NFR BLD AUTO: 0.2 %
KETONES UR QL STRIP: NEGATIVE
LDLC SERPL CALC-MCNC: 113.8 MG/DL
LEUKOCYTE ESTERASE UR QL STRIP: NEGATIVE
LYMPHOCYTES # BLD AUTO: 1.4 K/UL
LYMPHOCYTES NFR BLD: 28.7 %
MCH RBC QN AUTO: 27.1 PG
MCHC RBC AUTO-ENTMCNC: 31.7 G/DL
MCV RBC AUTO: 86 FL
MONOCYTES # BLD AUTO: 0.4 K/UL
MONOCYTES NFR BLD: 8.6 %
NEUTROPHILS # BLD AUTO: 3.1 K/UL
NEUTROPHILS NFR BLD: 61.3 %
NITRITE UR QL STRIP: NEGATIVE
NONHDLC SERPL-MCNC: 132 MG/DL
NRBC BLD-RTO: 0 /100 WBC
PH UR STRIP: 5 [PH] (ref 5–8)
PLATELET # BLD AUTO: 401 K/UL
PMV BLD AUTO: 10.8 FL
POTASSIUM SERPL-SCNC: 3.7 MMOL/L
PROT SERPL-MCNC: 7.7 G/DL
PROT UR QL STRIP: NEGATIVE
RBC # BLD AUTO: 4.61 M/UL
SODIUM SERPL-SCNC: 140 MMOL/L
SP GR UR STRIP: 1.01 (ref 1–1.03)
TRIGL SERPL-MCNC: 91 MG/DL
TSH SERPL DL<=0.005 MIU/L-ACNC: 1.62 UIU/ML
URN SPEC COLLECT METH UR: NORMAL
UROBILINOGEN UR STRIP-ACNC: NEGATIVE EU/DL
WBC # BLD AUTO: 5.01 K/UL

## 2018-03-13 PROCEDURE — 99999 PR PBB SHADOW E&M-EST. PATIENT-LVL III: CPT | Mod: PBBFAC,,, | Performed by: FAMILY MEDICINE

## 2018-03-13 PROCEDURE — 99396 PREV VISIT EST AGE 40-64: CPT | Mod: S$GLB,,, | Performed by: FAMILY MEDICINE

## 2018-03-13 PROCEDURE — 83036 HEMOGLOBIN GLYCOSYLATED A1C: CPT

## 2018-03-13 PROCEDURE — 81003 URINALYSIS AUTO W/O SCOPE: CPT

## 2018-03-13 PROCEDURE — 85025 COMPLETE CBC W/AUTO DIFF WBC: CPT

## 2018-03-13 PROCEDURE — 80061 LIPID PANEL: CPT

## 2018-03-13 PROCEDURE — 80053 COMPREHEN METABOLIC PANEL: CPT

## 2018-03-13 PROCEDURE — 84443 ASSAY THYROID STIM HORMONE: CPT

## 2018-03-13 PROCEDURE — 36415 COLL VENOUS BLD VENIPUNCTURE: CPT | Mod: PO

## 2018-03-13 PROCEDURE — 82728 ASSAY OF FERRITIN: CPT

## 2018-03-13 PROCEDURE — 82306 VITAMIN D 25 HYDROXY: CPT

## 2018-03-13 RX ORDER — LEVOCETIRIZINE DIHYDROCHLORIDE 5 MG/1
5 TABLET, FILM COATED ORAL NIGHTLY
Qty: 30 TABLET | Refills: 5 | Status: SHIPPED | OUTPATIENT
Start: 2018-03-13 | End: 2019-11-12 | Stop reason: SDUPTHER

## 2018-03-13 NOTE — PROGRESS NOTES
HISTORY OF PRESENT ILLNESS: Ms. Ovalle comes in today fasting and with taking medications for annual wellness examination.     END OF LIFE DECISION: She has a living will and does desire life support.    Current Outpatient Prescriptions   Medication Sig    aspirin (ECOTRIN) 81 MG EC tablet Take 81 mg by mouth once daily.    cetirizine (ZYRTEC) 10 MG tablet Take 10 mg by mouth daily as needed.     ergocalciferol (ERGOCALCIFEROL) 50,000 unit Cap TAKE 1 CAPSULE BY MOUTH EVERY 7 DAYS    fluticasone (FLONASE) 50 mcg/actuation nasal spray 2 sprays by Each Nare route once daily. (Patient taking differently: 2 sprays by Each Nare route daily as needed. )    IBUPROFEN/FAMOTIDINE (DUEXIS ORAL) Take 800 mg by mouth daily as needed.     iron-vitamin C 100-250 mg, ICAR-C, (FE C) 100-250 mg Tab Take 1 tablet by mouth once daily.    losartan (COZAAR) 50 MG tablet TAKE 1 TABLET(50 MG) BY MOUTH EVERY DAY    metFORMIN (GLUCOPHAGE-XR) 500 MG 24 hr tablet TAKE 1 TABLET BY MOUTH WITH DINNER FOR 1 WEEK, THEN TAKE 2 TABLETS BY MOUTH WITH DINNER     SCREENINGS:   Cholesterol: March 10, 2017.  FFS/Colonoscopy: Never.  Mammogram:April 2017 with GYN Dr. Kiesha hernandez per patient. Scheduled for April 2018.  GYN Exam: April 2017 with GYN Dr. Kiesha hernandez per patient. Scheduled for April 2018.  Dexa Scan: Never.  Eye Exam: February 2017 with Dr. Jones per patient. She wears glasses.  PPD: Negative in the past per patient.  Immunizations: Tdap - March 4, 2015.  Gardasil - N./A.  Zostavax - N./A.  Pneumovax - In the past.  Seasonal Flu - February 2018 at [facility].     ROS:  GENERAL: Denies fever, chills or unusual weight change. Appetite normal. Exercises little based on knee pain but reports cutting back as not eats dinner. Weight 152.3 kg (335 lb 12.2 oz) at September 13, 2017 visit. Reports always with fatigue but reports some non-compliance with iron therapy and works a lot of hours.  SKIN: Denies rashes, itching, changes in  mole, color or texture of skin or easy bruising. Reports wears compression stockings for varicose veins.  HEAD: Denies headaches or recent head trauma.  EYES: Denies change in vision, pain, diplopia, redness or discharge. Wears glasses.  EARS: Denies ear pain, discharge, vertigo or decreased hearing.  NOSE: Denies loss of smell, epistaxis or rhinitis.  MOUTH & THROAT: Denies hoarseness or change in voice. Denies excessive gum bleeding or mouth sores. Denies sore throat.   NODES: Denies swollen glands.  CHEST: Denies ROSARIO, wheezing, cough or sputum production. Reports no longer follows with Dr. Mata, pulmonologist, for surveillance of pulmonary embolism and last saw him 2013. Reports elevated blood pressure levels with slight dizziness, headache and stress over the weekend; reports highest level 121/101 and took OTC NSAID with help.  Reports feels better today; reports level of 112/82.  CARDIOVASCULAR: Denies chest pain, PND, orthopnea or reduced exercise tolerance. Denies palpitations.  ABDOMEN: Denies unusual diarrhea (as reports occasionally following gall bladder removal > 10 years ago), constipation, nausea, vomiting, abdominal pain, or blood in stool.  URINARY: Denies flank pain, dysuria or hematuria.  GENITOURINARY: Denies flank pain, dysuria, frequency or hematuria. Reports perimenopausal No change in menses. Occasionally performs monthly breast self examination.   ENDOCRINE: Denies diabetes, thyroid or cholesterol problems. Follows with Dr. Booker, endocrinologist, for prediabetes, hirsutism surveillance with last visit on October 12, 2017 with 6-month follow up advised and scheduled for April 12, 2018.  HEME/LYMPH: Denies bleeding problems. Follows with Dr. Pascual, hematologist, prn with last visit > 1 year ago for surveillance of Lupus anticoagulant disorder; she is now only on EC ASA 81 mg daily since Xarelto was discontinued April 2014.  PERIPHERAL VASCULAR:Denies claudication or  "cyanosis.  MUSCULOSKELETAL: Denies joint stiffness, pain or swelling except reports intermittent non-traumatic pain at both of knees; follows with Dr. Bubba Quiñonez, orthopedist, with last visit in December 2017 and treated with gel injections into right knee with help for pain. Denies edema.  NEUROLOGIC: Denies history of seizures, tremors, paralysis, alteration of gait or coordination.  PSYCHIATRIC: Denies mood swings, depression, anxiety, homicidal or suicidal thoughts. Denies sleep problems.                         PE:   VS: /62   Pulse 80   Temp 96.2 °F (35.7 °C) (Tympanic)   Resp 18   Ht 5' 6" (1.676 m)   Wt (!) 151.9 kg (334 lb 14.1 oz)   LMP 08/09/2017   SpO2 97%   BMI 54.05 kg/m²   APPEARANCE: Well nourished, well developed female, obese and pleasant, alert and oriented in no acute distress.   HEAD: Non tender. Full range of motion.  EYES: PERRL, conjunctiva pink, lids no edema.  EARS: External canal patent, no swelling or redness. TM's shiny and clear.  NOSE: Mucosa and turbinates pink, not swollen. No discharge. Non tender sinuses.  THROAT: No pharyngeal erythema or exudate. No stridor.   NECK: Supple, no mass, thyroid not enlarged.  NODES: No cervical lymph node enlargement.  CHEST: Normal respiratory effort. Lungs clear to auscultation.  CARDIOVASCULAR: Normal S1, S2. No rubs, murmurs or gallops. PMI not displaced. No carotid bruit. Pedal pulses palpable bilaterally. No edema.  ABDOMEN: Bowel sounds present. Not distended. Soft. No tenderness, masses or organomegaly.  BREAST EXAM: Not examined but deferred to GYN.  PELVIC EXAM: Not examined but deferred to GYN.  RECTAL EXAM: Not examined.  MUSCULOSKELETAL: No joint deformities or stiffness. She is ambulatory without problems.   SKIN: No rashes or suspicious lesions, normal color and turgor. Non tender slight varicose veins at lower legs.  NEUROLOGIC: Cranial Nerves: II-XII grossly intact. DTR's: Knees, Ankles 2+ and equal bilaterally. Gait " & Posture: Normal gait and fine motion.     ASSESSMENT:    ICD-10-CM ICD-9-CM    1. Annual physical exam Z00.00 V70.0 Ferritin      CBC auto differential      TSH      Urinalysis      Comprehensive metabolic panel      Lipid panel      Vitamin D      Hemoglobin A1c   2. Essential hypertension I10 401.9    3. Iron deficiency anemia, unspecified iron deficiency anemia type D50.9 280.9    4. Prediabetes R73.03 790.29    5. Vitamin D deficiency E55.9 268.9    6. History of pulmonary embolism Z86.711 V12.55    7. Arthritis of knee M17.10 716.96    8. Seasonal allergic rhinitis, unspecified chronicity, unspecified trigger J30.2 477.9    9. Perimenopausal N95.1 627.2    10. Morbid obesity with BMI of 50.0-59.9, adult E66.01 278.01     Z68.43 V85.43      PLAN:  1. Age-appropriate counseling-appropriate low-sodium, low-cholesterol, low carbohydrate diet and exercise daily, monthly breast self exam, annual wellness examination.   2. Patient advised to correspond via My Chart for result.  3. Continue current medications.  4. Keep follow up with specialists.  5. Prescription refill - Xyzal 5 mg daily prn, #30, 5 refills.  6. Follow-up in about 6 months (around 9/13/2018) for hypertension follow up.     Answers for HPI/ROS submitted by the patient on 3/9/2018   activity change: No  unexpected weight change: No  neck pain: No  hearing loss: No  rhinorrhea: No  trouble swallowing: No  eye discharge: No  visual disturbance: No  chest tightness: No  wheezing: No  chest pain: No  palpitations: No  blood in stool: No  constipation: No  vomiting: No  diarrhea: No  polydipsia: No  polyuria: No  difficulty urinating: No  hematuria: No  menstrual problem: No  dysuria: No  joint swelling: Yes  arthralgias: Yes  headaches: No  weakness: No  confusion: No  dysphoric mood: No

## 2018-03-15 RX ORDER — METFORMIN HYDROCHLORIDE 500 MG/1
TABLET, EXTENDED RELEASE ORAL
Qty: 60 TABLET | Refills: 0 | Status: SHIPPED | OUTPATIENT
Start: 2018-03-15 | End: 2018-04-16 | Stop reason: SDUPTHER

## 2018-03-25 ENCOUNTER — OFFICE VISIT (OUTPATIENT)
Dept: URGENT CARE | Facility: CLINIC | Age: 48
End: 2018-03-25
Payer: COMMERCIAL

## 2018-03-25 VITALS
BODY MASS INDEX: 47.09 KG/M2 | HEART RATE: 60 BPM | WEIGHT: 293 LBS | DIASTOLIC BLOOD PRESSURE: 80 MMHG | TEMPERATURE: 97 F | HEIGHT: 66 IN | OXYGEN SATURATION: 97 % | RESPIRATION RATE: 18 BRPM | SYSTOLIC BLOOD PRESSURE: 116 MMHG

## 2018-03-25 DIAGNOSIS — H10.9 CONJUNCTIVITIS OF BOTH EYES, UNSPECIFIED CONJUNCTIVITIS TYPE: Primary | ICD-10-CM

## 2018-03-25 PROCEDURE — 99213 OFFICE O/P EST LOW 20 MIN: CPT | Mod: S$GLB,,, | Performed by: NURSE PRACTITIONER

## 2018-03-25 PROCEDURE — 99999 PR PBB SHADOW E&M-EST. PATIENT-LVL IV: CPT | Mod: PBBFAC,,, | Performed by: NURSE PRACTITIONER

## 2018-03-25 PROCEDURE — 3079F DIAST BP 80-89 MM HG: CPT | Mod: CPTII,S$GLB,, | Performed by: NURSE PRACTITIONER

## 2018-03-25 PROCEDURE — 3074F SYST BP LT 130 MM HG: CPT | Mod: CPTII,S$GLB,, | Performed by: NURSE PRACTITIONER

## 2018-03-25 RX ORDER — OFLOXACIN 3 MG/ML
1 SOLUTION/ DROPS OPHTHALMIC 4 TIMES DAILY
Qty: 10 ML | Refills: 0 | Status: SHIPPED | OUTPATIENT
Start: 2018-03-25 | End: 2018-04-04

## 2018-03-25 NOTE — PROGRESS NOTES
Subjective:       Patient ID: Jenna Ovalle is a 47 y.o. female.    Chief Complaint: Conjunctivitis (left eye)    Conjunctivitis   This is a new problem. The current episode started today. The problem occurs constantly. The problem has been unchanged. Pertinent negatives include no congestion, coughing, fever, headaches or rash. Nothing aggravates the symptoms. She has tried nothing for the symptoms.     Review of Systems   Constitutional: Negative for activity change, appetite change and fever.   HENT: Negative for congestion, postnasal drip, rhinorrhea and sneezing.    Eyes: Positive for redness. Negative for photophobia, pain, discharge, itching and visual disturbance.   Respiratory: Negative for cough.    Endocrine: Positive for heat intolerance. Negative for cold intolerance.   Genitourinary: Negative.    Musculoskeletal: Negative.    Skin: Negative for rash.   Allergic/Immunologic: Positive for environmental allergies (seasonal ). Negative for food allergies.   Neurological: Negative for headaches.   All other systems reviewed and are negative.      Objective:      Physical Exam   Constitutional: She is oriented to person, place, and time. Vital signs are normal. She appears well-developed and well-nourished. She is active and cooperative.  Non-toxic appearance. She does not appear ill. No distress.   HENT:   Head: Normocephalic.   Right Ear: External ear normal.   Left Ear: External ear normal.   Nose: Nose normal.   Mouth/Throat: Oropharynx is clear and moist. No oropharyngeal exudate.   Eyes: Conjunctivae, EOM and lids are normal. Pupils are equal, round, and reactive to light. Right eye exhibits no discharge and no exudate. Left eye exhibits no discharge and no exudate. Right conjunctiva is not injected. Right conjunctiva has no hemorrhage. Left conjunctiva is not injected (greater than right). Left conjunctiva has no hemorrhage.   Neck: Normal range of motion. Neck supple.   Cardiovascular: Normal  rate, regular rhythm, normal heart sounds and intact distal pulses.    Pulmonary/Chest: Effort normal and breath sounds normal. No stridor.   Abdominal: Soft.   Musculoskeletal: Normal range of motion.   Lymphadenopathy:     She has no cervical adenopathy.   Neurological: She is alert and oriented to person, place, and time.   Skin: Skin is warm and dry.   Psychiatric: She has a normal mood and affect. Her behavior is normal. Judgment and thought content normal.   Vitals reviewed.      Assessment:       1. Conjunctivitis of both eyes, unspecified conjunctivitis type        Plan:       Jenna was seen today for conjunctivitis.    Diagnoses and all orders for this visit:    Conjunctivitis of both eyes, unspecified conjunctivitis type  -     ofloxacin (OCUFLOX) 0.3 % ophthalmic solution; Place 1 drop into both eyes 4 (four) times daily.  -     naphazoline-pheniramine 0.025-0.3% (NAPHCON-A) 0.025-0.3 % ophthalmic solution; Place 1 drop into both eyes 4 (four) times daily.          Also begin Naphcon A to cover for allergies.   Follow up with ophthalmology if not improved over next 3-5 days, sooner if worsens.   Continue xyzal 5mg nightly

## 2018-03-25 NOTE — PATIENT INSTRUCTIONS
Conjunctivitis, Nonspecific    The membrane that covers the white part of your eye (the conjunctiva) is inflamed. Inflammation happens when your body responds to an injury, allergic reaction, infection, or illness. Symptoms of inflammation in the eye may include redness, irritation, itching, swelling, or burning. These symptoms should go away within the next 24 hours. Conjunctivitis may be related to a particle that was in your eye. If so, it may wash out with your tears or irrigation treatment. Being exposed to liquid chemicals or fumes may also cause this reaction.   Home care  · Apply a cold pack (ice in a plastic bag, wrapped in a towel) over the eye for 20 minutes at a time. This will reduce pain.  · Artificial tears may be prescribed to reduce irritation or redness.  These should be used 3 to 4 times a day.  · You may use acetaminophen or ibuprofen to control pain, unless another medicine was prescribed.(Note: If you have chronic liver or kidney disease, or if you have ever had a stomach ulcer or gastrointestinal bleeding, talk with your healthcare provider before using these medicines.)  Follow-up care  Follow up with your healthcare provider, or as advised.  When to seek medical advice  Call your healthcare provider right away if any of these occur:  · Increased eyelid swelling  · Increased eye pain  · Increased redness or drainage from the eye  · Increased blurry vision or increased sensitivity to light  · Failure of normal vision to return within 24 to 48 hours  Date Last Reviewed: 6/14/2015  © 6360-8135 [employer]. [address] [place] [address], [place] [address]. All rights reserved. This information is not intended as a substitute for professional medical care. Always follow your healthcare professional's instructions.        Conjunctivitis, Bacterial    You have an infection in the membranes covering the white part of the eye. This part of the eye is called the conjunctiva. The infection is  called conjunctivitis. The most common symptoms of conjunctivitis include a thick, pus-like discharge from the eye, swollen eyelids, redness, eyelids sticking together upon awakening, and a gritty or scratchy feeling in the eye. Your infection was caused by bacteria. It may be treated with medicine. With treatment, the infection takes about 7 to 10 days to resolve.  Home care  · Use prescribed antibiotic eye drops or ointment as directed to treat the infection.  · Apply a warm compress (towel soaked in warm water) to the affected eye 3 to 4 times a day. Do this just before applying medicine to the eye.  · Use a warm, wet cloth to wipe away crusting of the eyelids in the morning. This is caused by mucus drainage during the night. You may also use saline irrigating solution or artificial tears to rinse away mucus in the eye. Do not put a patch over the eye.  · Wash your hands before and after touching the infected eye. This is to prevent spreading the infection to the other eye, and to other people. Do not share your towels or washcloths with others.  · You may use acetaminophen or ibuprofen to control pain, unless another medicine was prescribed. (Note: If you have chronic liver or kidney disease or have ever had a stomach ulcer or gastrointestinal bleeding, talk with your doctor before using these medicines.)  · Do not wear contact lenses until your eyes have healed and all symptoms are gone.  Follow-up care  Follow up with your healthcare provider, or as advised.  When to seek medical advice  Call your healthcare provider right away if any of these occur:  · Worsening vision  · Increasing pain in the eye  · Increasing swelling or redness of the eyelid  · Redness spreading around the eye  Date Last Reviewed: 6/14/2015  © 2699-7056 [employer]. [address] [place] [address], [place] [address]. All rights reserved. This information is not intended as a substitute for professional medical care. Always follow  your healthcare professional's instructions.

## 2018-04-16 RX ORDER — METFORMIN HYDROCHLORIDE 500 MG/1
TABLET, EXTENDED RELEASE ORAL
Qty: 60 TABLET | Refills: 0 | Status: SHIPPED | OUTPATIENT
Start: 2018-04-16 | End: 2018-05-19 | Stop reason: SDUPTHER

## 2018-04-20 ENCOUNTER — TELEPHONE (OUTPATIENT)
Dept: FAMILY MEDICINE | Facility: CLINIC | Age: 48
End: 2018-04-20

## 2018-04-20 NOTE — TELEPHONE ENCOUNTER
Pt states she will use eye drops given by eye dr and will make appt if she doesn't get any better.

## 2018-04-20 NOTE — TELEPHONE ENCOUNTER
----- Message from Kevin Atwood MA sent at 4/20/2018 11:50 AM CDT -----  Contact: Pt  Pt called and would like a call back from the nurse regarding her eye [facility].    Pt can be reached at [phone].    Thanks

## 2018-04-29 RX ORDER — LOSARTAN POTASSIUM 50 MG/1
TABLET ORAL
Qty: 90 TABLET | Refills: 1 | Status: SHIPPED | OUTPATIENT
Start: 2018-04-29 | End: 2018-10-29 | Stop reason: SDUPTHER

## 2018-05-03 ENCOUNTER — LAB VISIT (OUTPATIENT)
Dept: LAB | Facility: HOSPITAL | Age: 48
End: 2018-05-03
Attending: INTERNAL MEDICINE
Payer: COMMERCIAL

## 2018-05-03 ENCOUNTER — OFFICE VISIT (OUTPATIENT)
Dept: ENDOCRINOLOGY | Facility: CLINIC | Age: 48
End: 2018-05-03
Payer: COMMERCIAL

## 2018-05-03 VITALS
DIASTOLIC BLOOD PRESSURE: 80 MMHG | HEART RATE: 54 BPM | BODY MASS INDEX: 47.09 KG/M2 | TEMPERATURE: 98 F | WEIGHT: 293 LBS | HEIGHT: 66 IN | SYSTOLIC BLOOD PRESSURE: 128 MMHG

## 2018-05-03 DIAGNOSIS — R51.9 PERSISTENT HEADACHES: ICD-10-CM

## 2018-05-03 DIAGNOSIS — N92.6 IRREGULAR PERIODS/MENSTRUAL CYCLES: ICD-10-CM

## 2018-05-03 DIAGNOSIS — L68.0 HIRSUTISM: ICD-10-CM

## 2018-05-03 DIAGNOSIS — R73.03 PREDIABETES: Primary | ICD-10-CM

## 2018-05-03 LAB
ESTRADIOL SERPL-MCNC: 55 PG/ML
FSH SERPL-ACNC: 26.6 MIU/ML
LH SERPL-ACNC: 22.4 MIU/ML
PROLACTIN SERPL IA-MCNC: 5.6 NG/ML

## 2018-05-03 PROCEDURE — 3079F DIAST BP 80-89 MM HG: CPT | Mod: CPTII,S$GLB,, | Performed by: INTERNAL MEDICINE

## 2018-05-03 PROCEDURE — 99214 OFFICE O/P EST MOD 30 MIN: CPT | Mod: S$GLB,,, | Performed by: INTERNAL MEDICINE

## 2018-05-03 PROCEDURE — 99999 PR PBB SHADOW E&M-EST. PATIENT-LVL III: CPT | Mod: PBBFAC,,, | Performed by: INTERNAL MEDICINE

## 2018-05-03 PROCEDURE — 36415 COLL VENOUS BLD VENIPUNCTURE: CPT | Mod: PO

## 2018-05-03 PROCEDURE — 83002 ASSAY OF GONADOTROPIN (LH): CPT

## 2018-05-03 PROCEDURE — 82670 ASSAY OF TOTAL ESTRADIOL: CPT

## 2018-05-03 PROCEDURE — 84146 ASSAY OF PROLACTIN: CPT

## 2018-05-03 PROCEDURE — 83001 ASSAY OF GONADOTROPIN (FSH): CPT

## 2018-05-03 PROCEDURE — 3008F BODY MASS INDEX DOCD: CPT | Mod: CPTII,S$GLB,, | Performed by: INTERNAL MEDICINE

## 2018-05-03 PROCEDURE — 3074F SYST BP LT 130 MM HG: CPT | Mod: CPTII,S$GLB,, | Performed by: INTERNAL MEDICINE

## 2018-05-03 PROCEDURE — 82024 ASSAY OF ACTH: CPT

## 2018-05-03 NOTE — PROGRESS NOTES
Patient ID: Jenna Ovalle is a 48 y.o. female.  Patient is here for follow up        Chief Complaint: Insulin Resistance      HPI  Patient with history of hirsutism  Had laser rx  For 9 months which helps hair on chin but wanted to see if hormonal issues-her testosterone and DHEA S were not elevated and prolactin was normal    She was found to be prediabetic with an A1c of 5.9 and I started metformin 500 mg and she is tolerating 2 a day, initially lost 8 pounds in weight is stable      Saw gyn in May- may be premeripenousal, skipped months with cycle- LNMP , prior to this was in August     12 years ago dx with fibroids- had myomectomy, but returned, may get hysterectomy at end of year,  Periods use to be heavy and lasted 7 ydays, requires iron      Has chronic knee pain and plans to start back with pool therapy.  Had good success with Weight Watchers in the past and is thinking about that again      Loose stools since GB  Removed-this has increased a bit since on metformin but not severe    c 5.9 May 2016  Hx of Pulm embolism possibly due to BCP    Her concern today is new onset of headache that is persisting.  She later developed redness in her eye and so I specialist who treated her for eye infection and the headaches seemed to improve but has returned.  Denies any galactorrhea.  Reports years ago was told she had pseudotumor cerebri and she had been monitoring her blood pressure at home and it was fine and no blurry vision and she started feeling some pressure in her sinus area and she was treated with eyedrops and that resolved the redness in her eye  I have reviewed the past medical, family and social history    Review of Systems   Constitutional: Negative for appetite change, fatigue, fever and unexpected weight change.   HENT: Negative for sore throat and trouble swallowing.    Eyes: Negative for visual disturbance.   Respiratory: Negative for shortness of breath and wheezing.     Cardiovascular: Negative for chest pain, palpitations and leg swelling.   Gastrointestinal: Negative for diarrhea, nausea and vomiting.   Endocrine: Negative for cold intolerance, heat intolerance, polydipsia, polyphagia and polyuria.   Genitourinary: Negative for difficulty urinating, dysuria and menstrual problem.   Musculoskeletal: Positive for arthralgias. Negative for joint swelling.   Skin: Negative for rash.   Neurological: Positive for headaches. Negative for dizziness, weakness and numbness.   Psychiatric/Behavioral: Negative for confusion, dysphoric mood and sleep disturbance.       Objective:      Physical Exam   Constitutional: She appears well-developed and well-nourished. No distress.   HENT:   Head: Normocephalic and atraumatic.   Eyes: Conjunctivae and EOM are normal. Pupils are equal, round, and reactive to light. Right eye exhibits no discharge. Left eye exhibits no discharge. No scleral icterus.   Neurological: She is alert. No sensory deficit.        Skin: Skin is warm and dry. No rash noted. She is not diaphoretic. No erythema.   Psychiatric: She has a normal mood and affect. Her behavior is normal.   Vitals reviewed.        Lab Review:   Lab Visit on 03/13/2018   Component Date Value    Ferritin 03/13/2018 63     WBC 03/13/2018 5.01     RBC 03/13/2018 4.61     Hemoglobin 03/13/2018 12.5     Hematocrit 03/13/2018 39.4     MCV 03/13/2018 86     MCH 03/13/2018 27.1     MCHC 03/13/2018 31.7*    RDW 03/13/2018 15.5*    Platelets 03/13/2018 401*    MPV 03/13/2018 10.8     Immature Granulocytes 03/13/2018 0.2     Gran # (ANC) 03/13/2018 3.1     Immature Grans (Abs) 03/13/2018 0.01     Lymph # 03/13/2018 1.4     Mono # 03/13/2018 0.4     Eos # 03/13/2018 0.0     Baso # 03/13/2018 0.02     nRBC 03/13/2018 0     Gran% 03/13/2018 61.3     Lymph% 03/13/2018 28.7     Mono% 03/13/2018 8.6     Eosinophil% 03/13/2018 0.8     Basophil% 03/13/2018 0.4     Differential Method 03/13/2018  Automated     TSH 03/13/2018 1.623     Sodium 03/13/2018 140     Potassium 03/13/2018 3.7     Chloride 03/13/2018 105     CO2 03/13/2018 24     Glucose 03/13/2018 87     BUN, Bld 03/13/2018 7     Creatinine 03/13/2018 0.6     Calcium 03/13/2018 9.4     Total Protein 03/13/2018 7.7     Albumin 03/13/2018 3.2*    Total Bilirubin 03/13/2018 0.3     Alkaline Phosphatase 03/13/2018 71     AST 03/13/2018 14     ALT 03/13/2018 11     Anion Gap 03/13/2018 11     eGFR if African American 03/13/2018 >60.0     eGFR if non African Amer* 03/13/2018 >60.0     Cholesterol 03/13/2018 195     Triglycerides 03/13/2018 91     HDL 03/13/2018 63     LDL Cholesterol 03/13/2018 113.8     HDL/Chol Ratio 03/13/2018 32.3     Total Cholesterol/HDL Ra* 03/13/2018 3.1     Non-HDL Cholesterol 03/13/2018 132     Vit D, 25-Hydroxy 03/13/2018 17*    Hemoglobin A1C 03/13/2018 5.5     Estimated Avg Glucose 03/13/2018 111    Office Visit on 03/13/2018   Component Date Value    Specimen UA 03/13/2018 Urine, Clean Catch     Color, UA 03/13/2018 Yellow     Appearance, UA 03/13/2018 Clear     pH, UA 03/13/2018 5.0     Specific Gravity, UA 03/13/2018 1.015     Protein, UA 03/13/2018 Negative     Glucose, UA 03/13/2018 Negative     Ketones, UA 03/13/2018 Negative     Bilirubin (UA) 03/13/2018 Negative     Occult Blood UA 03/13/2018 Negative     Nitrite, UA 03/13/2018 Negative     Urobilinogen, UA 03/13/2018 Negative     Leukocytes, UA 03/13/2018 Negative        Assessment:     1. Prediabetes     2. Hirsutism     3. Irregular periods/menstrual cycles  Prolactin    Estradiol    Follicle stimulating hormone    Luteinizing hormone    ACTH   4. Persistent headaches  Prolactin    Estradiol    Follicle stimulating hormone    Luteinizing hormone    ACTH    in regards to her prediabetes this is stable and she should continue on metformin and I recommended looking back into weight watchers and getting back on track with  consistency with aerobic exercise and doing pool activity or some program where she is working in a group may be helpful as well    She can continue laser treatment as needed for hirsutism    From an endocrine standpoint in terms of her new problem of headaches I will check a prolactin level since she's been having irregular periods though this may be related to being perimenopausal and I will check LH and FSH and estradiol.  If her prolactin is normal than I would recommend she follows up with her PCP for ongoing headache workup.  Could be her sinuses are other causes and can otherwise follow up with me in 1 year  Plan:   Prediabetes    Hirsutism    Irregular periods/menstrual cycles  -     Prolactin; Future; Expected date: 05/03/2018  -     Estradiol; Future; Expected date: 05/03/2018  -     Follicle stimulating hormone; Future; Expected date: 05/03/2018  -     Luteinizing hormone; Future; Expected date: 05/03/2018  -     ACTH; Future; Expected date: 05/03/2018    Persistent headaches  -     Prolactin; Future; Expected date: 05/03/2018  -     Estradiol; Future; Expected date: 05/03/2018  -     Follicle stimulating hormone; Future; Expected date: 05/03/2018  -     Luteinizing hormone; Future; Expected date: 05/03/2018  -     ACTH; Future; Expected date: 05/03/2018          Follow-up in about 1 year (around 5/3/2019).    Labs prior to appointment? not applicable     Disclaimer:  This note may have been partially prepared using voice recognition software and  it may have not been extensively proofed, as such there could be errors within the text such as sound alike errors.

## 2018-05-04 LAB — ACTH PLAS-MCNC: 16 PG/ML

## 2018-05-21 RX ORDER — METFORMIN HYDROCHLORIDE 500 MG/1
TABLET, EXTENDED RELEASE ORAL
Qty: 60 TABLET | Refills: 0 | Status: SHIPPED | OUTPATIENT
Start: 2018-05-21 | End: 2018-06-23 | Stop reason: SDUPTHER

## 2018-05-29 ENCOUNTER — OFFICE VISIT (OUTPATIENT)
Dept: URGENT CARE | Facility: CLINIC | Age: 48
End: 2018-05-29
Payer: COMMERCIAL

## 2018-05-29 VITALS
OXYGEN SATURATION: 98 % | HEIGHT: 66 IN | RESPIRATION RATE: 16 BRPM | DIASTOLIC BLOOD PRESSURE: 72 MMHG | HEART RATE: 66 BPM | TEMPERATURE: 97 F | WEIGHT: 293 LBS | BODY MASS INDEX: 47.09 KG/M2 | SYSTOLIC BLOOD PRESSURE: 130 MMHG

## 2018-05-29 DIAGNOSIS — J30.9 ALLERGIC RHINITIS, UNSPECIFIED SEASONALITY, UNSPECIFIED TRIGGER: Primary | ICD-10-CM

## 2018-05-29 DIAGNOSIS — J02.9 SORE THROAT: ICD-10-CM

## 2018-05-29 PROCEDURE — 3075F SYST BP GE 130 - 139MM HG: CPT | Mod: CPTII,S$GLB,, | Performed by: PHYSICIAN ASSISTANT

## 2018-05-29 PROCEDURE — 99214 OFFICE O/P EST MOD 30 MIN: CPT | Mod: S$GLB,,, | Performed by: PHYSICIAN ASSISTANT

## 2018-05-29 PROCEDURE — 3078F DIAST BP <80 MM HG: CPT | Mod: CPTII,S$GLB,, | Performed by: PHYSICIAN ASSISTANT

## 2018-05-29 PROCEDURE — 99999 PR PBB SHADOW E&M-EST. PATIENT-LVL IV: CPT | Mod: PBBFAC,,, | Performed by: PHYSICIAN ASSISTANT

## 2018-05-29 RX ORDER — MONTELUKAST SODIUM 10 MG/1
10 TABLET ORAL NIGHTLY
Qty: 30 TABLET | Refills: 0 | Status: SHIPPED | OUTPATIENT
Start: 2018-05-29 | End: 2018-06-28

## 2018-05-30 ENCOUNTER — PATIENT MESSAGE (OUTPATIENT)
Dept: ENDOCRINOLOGY | Facility: CLINIC | Age: 48
End: 2018-05-30

## 2018-05-30 NOTE — PATIENT INSTRUCTIONS
Allergic Rhinitis  Allergic rhinitis is an allergic reaction that affects the nose, and often the eyes. Its often known as nasal allergies. Nasal allergies are often due to things in the environment that are breathed in. Depending what you are sensitive to, nasal allergies may occur only during certain seasons. Or they may occur year round. Common indoor allergens include house dust mites, mold, cockroaches, and pet dander. Outdoor allergens include pollen from trees, grasses, and weeds.   Symptoms include a drippy, stuffy, and itchy nose. They also include sneezing and red and itchy eyes. You may feel tired more often. Severe allergies may also affect your breathing and trigger a condition called asthma.   Tests can be done to see what allergens are affecting you. You may be referred to an allergy specialist for testing and further evaluation.  Home care  Your healthcare provider may prescribe medicines to help relieve allergy symptoms. These may include oral medicines, nasal sprays, or eye drops.  Ask your provider for advice on how to avoid substances that you are allergic to. Below are a few tips for each type of allergen.  Pet dander:  · Do not have pets with fur and feathers.  · If you can't avoid having a pet, keep it out of your bedroom and off upholstered furniture.  Pollen:  · When pollen counts are high, keep windows of your car and home closed. If possible, use an air conditioner instead.  · Wear a filter mask when mowing or doing yard work.  House dust mites:  · Wash bedding every week in warm water and detergent and dry on a hot setting.  · Cover the mattress, box spring, and pillows with allergy covers.   · If possible, sleep in a room with no carpet, curtains, or upholstered furniture.  Cockroaches:  · Store food in sealed containers.  · Remove garbage from the home promptly.  · Fix water leaks  Mold:  · Keep humidity low by using a dehumidifier or air conditioner. Keep the dehumidifier and air  conditioner clean and free of mold.  · Clean moldy areas with bleach and water.  In general:  · Vacuum once or twice a week. If possible, use a vacuum with a high-efficiency particulate air (HEPA) filter.  · Do not smoke. Avoid cigarette smoke. Cigarette smoke is an irritant that can make symptoms worse.  Follow-up care  Follow up as advised by the healthcare provider or our staff. If you were referred to an allergy specialist, make this appointment promptly.  When to seek medical advice  Call your healthcare provider right away if the following occur:  · Coughing or wheezing  · Fever greater than 100.4°F (38°C)  · Hives (raised red bumps)  · Continuing symptoms, new symptoms, or worsening symptoms  Call 911 right away if you have:  · Trouble breathing  · Severe swelling of the face or severe itching of the eyes or mouth  Date Last Reviewed: 3/1/2017  © 2025-4708 [employer]. [address] [place] [address]. All rights reserved. This information is not intended as a substitute for professional medical care. Always follow your healthcare professional's instructions.    Rest.  Use warm compresses on sinuses for relief several times a day.  Increase fluid intake to at least 64 ounces of water per day to keep secretions thin and loose.  Normal saline nasal wash or Flonase to irrigate sinuses and for congestion/runny nose.  Use a cool mist vaporizer or humidifier in home to help relieve congestion.    Practice good handwashing.  Zyrtec-D or Claritin-D to help dry mucus and post nasal drip.  Mucinex or Mucinex DM for cough and chest congestion.  Avoid decongestants (Sudafed,Mucinex-D,Claritin-D, etc) if you have hypertension.  Tylenol or Ibuprofen for fever, headache and body aches.  Warm salt water gargles and lozenges for throat comfort.  Chloraseptic spray or lozenges for throat comfort.  See PCP if symptoms fail to improve.   Go to ER if you develop fever of 103 or higher, chest pain, shortness  of breath, upper back pain, stiff neck or severe headache.

## 2018-05-30 NOTE — PROGRESS NOTES
"Subjective:      Patient ID: Jenna Ovalle is a 48 y.o. female.    Chief Complaint: Sinus Problem and Sore Throat    Sinus Problem   This is a new problem. The current episode started in the past 7 days. Associated symptoms include a sore throat. Pertinent negatives include no chills, congestion, coughing, diaphoresis, headaches, shortness of breath or sinus pressure. Treatments tried: flonase, zyrtec.     Review of Systems   Constitutional: Negative for chills, diaphoresis and fever.   HENT: Positive for postnasal drip, rhinorrhea (clear) and sore throat. Negative for congestion, sinus pain and sinus pressure.         Odor to breath   Respiratory: Negative for cough, shortness of breath and wheezing.    Gastrointestinal: Negative for abdominal pain, constipation, diarrhea, nausea and vomiting.   Neurological: Negative for dizziness, light-headedness and headaches.       Objective:   /72 (BP Location: Right arm, Patient Position: Sitting)   Pulse 66   Temp 96.8 °F (36 °C) (Tympanic)   Resp 16   Ht 5' 6" (1.676 m)   Wt (!) 156 kg (343 lb 14.7 oz)   SpO2 98%   BMI 55.51 kg/m²   Physical Exam   Constitutional: She appears well-developed and well-nourished. She does not appear ill. No distress.   HENT:   Head: Normocephalic and atraumatic.   Right Ear: Tympanic membrane and ear canal normal. Tympanic membrane is not erythematous. No middle ear effusion.   Left Ear: Tympanic membrane and ear canal normal. Tympanic membrane is not erythematous.  No middle ear effusion.   Nose: Mucosal edema and rhinorrhea present. Right sinus exhibits no maxillary sinus tenderness and no frontal sinus tenderness. Left sinus exhibits no maxillary sinus tenderness and no frontal sinus tenderness.   Mouth/Throat: Uvula is midline and oropharynx is clear and moist. No posterior oropharyngeal erythema.   Signs of PND   Cardiovascular: Normal rate, regular rhythm and normal heart sounds.    No murmur heard.  Pulmonary/Chest: " Effort normal and breath sounds normal. No respiratory distress. She has no decreased breath sounds. She has no wheezes. She has no rhonchi. She has no rales.   Skin: Skin is warm and dry. No rash noted. She is not diaphoretic.   Psychiatric: She has a normal mood and affect. Her speech is normal and behavior is normal. Thought content normal.     Assessment:      1. Allergic rhinitis, unspecified seasonality, unspecified trigger    2. Sore throat       Plan:   Allergic rhinitis, unspecified seasonality, unspecified trigger  -     montelukast (SINGULAIR) 10 mg tablet; Take 1 tablet (10 mg total) by mouth every evening.  Dispense: 30 tablet; Refill: 0    Sore throat  -     (Magic mouthwash) 1:1:1 Benadryl 12.5mg/5ml liq, aluminum & magnesium hydroxide-simehticone (Maalox), lidocaine viscous 2%; Swish and spit 15 mLs every 4 (four) hours as needed (throat pain).  Dispense: 240 mL; Refill: 0    Increased hydration, rest, flonase    Gave handout on allergic rhinitis.  Printed AVS and reviewed treatment plan in detail.    Discussed worsening signs/symptoms and when to return to clinic or go to ED.   Patient expresses understanding and agrees with treatment plan.

## 2018-06-25 RX ORDER — METFORMIN HYDROCHLORIDE 500 MG/1
TABLET, EXTENDED RELEASE ORAL
Qty: 60 TABLET | Refills: 0 | Status: SHIPPED | OUTPATIENT
Start: 2018-06-25 | End: 2018-08-03 | Stop reason: SDUPTHER

## 2018-07-30 ENCOUNTER — TELEPHONE (OUTPATIENT)
Dept: FAMILY MEDICINE | Facility: CLINIC | Age: 48
End: 2018-07-30

## 2018-07-30 NOTE — TELEPHONE ENCOUNTER
----- Message from Elaina Mckinnon sent at 7/30/2018  4:34 PM CDT -----  Contact: self  Pt is calling in regards to having the office request her medical records from the [facility] at [address]. [address]. Their phone number is [phone]. The two physicians seen were Dr. Cole Quiñonez and Dr. Alejandro Satnos.     Pt can be reached at [phone].    Thank you

## 2018-08-04 RX ORDER — METFORMIN HYDROCHLORIDE 500 MG/1
TABLET, EXTENDED RELEASE ORAL
Qty: 60 TABLET | Refills: 0 | Status: SHIPPED | OUTPATIENT
Start: 2018-08-04 | End: 2018-09-10 | Stop reason: SDUPTHER

## 2018-08-28 RX ORDER — ERGOCALCIFEROL 1.25 MG/1
CAPSULE ORAL
Qty: 4 CAPSULE | Refills: 5 | Status: SHIPPED | OUTPATIENT
Start: 2018-08-28 | End: 2019-04-16 | Stop reason: SDUPTHER

## 2018-09-11 RX ORDER — METFORMIN HYDROCHLORIDE 500 MG/1
TABLET, EXTENDED RELEASE ORAL
Qty: 60 TABLET | Refills: 3 | Status: SHIPPED | OUTPATIENT
Start: 2018-09-11 | End: 2020-09-23

## 2018-09-13 ENCOUNTER — OFFICE VISIT (OUTPATIENT)
Dept: FAMILY MEDICINE | Facility: CLINIC | Age: 48
End: 2018-09-13
Payer: COMMERCIAL

## 2018-09-13 VITALS
WEIGHT: 293 LBS | HEART RATE: 87 BPM | RESPIRATION RATE: 17 BRPM | TEMPERATURE: 98 F | DIASTOLIC BLOOD PRESSURE: 78 MMHG | HEIGHT: 66 IN | SYSTOLIC BLOOD PRESSURE: 116 MMHG | OXYGEN SATURATION: 96 % | BODY MASS INDEX: 47.09 KG/M2

## 2018-09-13 DIAGNOSIS — D50.9 IRON DEFICIENCY ANEMIA, UNSPECIFIED IRON DEFICIENCY ANEMIA TYPE: ICD-10-CM

## 2018-09-13 DIAGNOSIS — R20.2 NUMBNESS AND TINGLING IN BOTH HANDS: ICD-10-CM

## 2018-09-13 DIAGNOSIS — E55.9 VITAMIN D DEFICIENCY: ICD-10-CM

## 2018-09-13 DIAGNOSIS — R20.0 NUMBNESS AND TINGLING IN BOTH HANDS: ICD-10-CM

## 2018-09-13 DIAGNOSIS — R53.83 FATIGUE, UNSPECIFIED TYPE: ICD-10-CM

## 2018-09-13 DIAGNOSIS — I10 ESSENTIAL HYPERTENSION: ICD-10-CM

## 2018-09-13 DIAGNOSIS — Z23 NEED FOR INFLUENZA VACCINATION: ICD-10-CM

## 2018-09-13 DIAGNOSIS — R73.03 PREDIABETES: ICD-10-CM

## 2018-09-13 PROCEDURE — 90686 IIV4 VACC NO PRSV 0.5 ML IM: CPT | Mod: S$GLB,,, | Performed by: FAMILY MEDICINE

## 2018-09-13 PROCEDURE — 3078F DIAST BP <80 MM HG: CPT | Mod: CPTII,S$GLB,, | Performed by: FAMILY MEDICINE

## 2018-09-13 PROCEDURE — 99999 PR PBB SHADOW E&M-EST. PATIENT-LVL IV: CPT | Mod: PBBFAC,,, | Performed by: FAMILY MEDICINE

## 2018-09-13 PROCEDURE — 3008F BODY MASS INDEX DOCD: CPT | Mod: CPTII,S$GLB,, | Performed by: FAMILY MEDICINE

## 2018-09-13 PROCEDURE — 3074F SYST BP LT 130 MM HG: CPT | Mod: CPTII,S$GLB,, | Performed by: FAMILY MEDICINE

## 2018-09-13 PROCEDURE — 99214 OFFICE O/P EST MOD 30 MIN: CPT | Mod: 25,S$GLB,, | Performed by: FAMILY MEDICINE

## 2018-09-13 PROCEDURE — 90471 IMMUNIZATION ADMIN: CPT | Mod: S$GLB,,, | Performed by: FAMILY MEDICINE

## 2018-09-13 RX ORDER — MULTIVITAMIN
1 TABLET ORAL DAILY
COMMUNITY
End: 2019-03-14

## 2018-09-13 NOTE — PROGRESS NOTES
Jenna Ovalle    Chief Complaint   Patient presents with    Hypertension    Follow-up       History of Present Illness:   Ms. Ovalle comes in today fasting with taking blood pressure medication for 6-month hypertension follow-up.  She states she has not been monitoring her diet but states she has been exercising in the pool  3 times a week-45-60 minutes each time for knee therapy.    She follows with orthopedist Dr. Minaya for treatment of knee osteoarthritis and possible meniscal tear and states she has received shot into her right knee with some help.    She saw Dr. Booker, endocrinologist, on May 3, 2018 for surveillance of pre diabetes with 1-year follow-up advised.  She reports having polyphagia but also reports having diarrhea with taking metformin therapy.  Otherwise, she denies having polydipsia, polyuria.     She states she saw gynecologist Dr. Kiesha Ellington in May 2018 for annual Pap smear with mammogram.  She states she was told she is perimenopausal.    She follows at [facility]  and states she was told she has a fungus in her nail and given topical solution to apply which she states has been helpful.    She reports having more fatigue despite taking vitamin-D therapy for 3-6 months.  She reports having rare dizziness and faint-like feeling despite denies having syncopal episodes, headaches, fever, chills; shortness of breath, cough, wheezing; chest pain, palpitations, leg swelling; abdominal pain, nausea, vomiting, constipation; unusual urinary symptoms; back pain; headaches; anxiety, depression, homicidal or suicidal thoughts.    She reports intermittently over the past 3-4 months having tingling and numbness at both of her forearms and wrists and more so with sleeping.  She denies associated swelling, pain or trauma.  She is left handed.    Labs:                   WBC                      5.01                03/13/2018                 HGB                      12.5                 03/13/2018                 HCT                      39.4                03/13/2018                 PLT                      401 (H)             03/13/2018                 CHOL                     195                 03/13/2018                 TRIG                     91                  03/13/2018                 HDL                      63                  03/13/2018                 ALT                      11                  03/13/2018                 AST                      14                  03/13/2018                 NA                       140                 03/13/2018                 K                        3.7                 03/13/2018                 CL                       105                 03/13/2018                 CREATININE               0.6                 03/13/2018                 BUN                      7                   03/13/2018                 CO2                      24                  03/13/2018                 TSH                      1.623               03/13/2018                 INR                      4.1 (H)             10/07/2010                 HGBA1C                   5.5                 03/13/2018            Vit D, 25-Hydroxy            17            03/13/2018            Current Outpatient Medications   Medication Sig    aspirin (ECOTRIN) 81 MG EC tablet Take 81 mg by mouth once daily.    ergocalciferol (ERGOCALCIFEROL) 50,000 unit Cap TAKE 1 CAPSULE BY MOUTH EVERY 7 DAYS    fluticasone (FLONASE) 50 mcg/actuation nasal spray 2 sprays by Each Nare route once daily. (Patient taking differently: 2 sprays by Each Nare route daily as needed. )    IBUPROFEN/FAMOTIDINE (DUEXIS ORAL) Take 800 mg by mouth daily as needed.     iron-vitamin C 100-250 mg, ICAR-C, (FE C) 100-250 mg Tab Take 1 tablet by mouth once daily.    levocetirizine (XYZAL) 5 MG tablet Take 1 tablet (5 mg total) by mouth every evening.    losartan (COZAAR) 50 MG tablet TAKE 1 TABLET(50 MG)  BY MOUTH EVERY DAY    metFORMIN (GLUCOPHAGE-XR) 500 MG 24 hr tablet TAKE 1 TABLET BY MOUTH WITH DINNER FOR 1 WEEK, THEN TAKE 2 TABLETS BY MOUTH WITH DINNER       Review of Systems   Constitutional: Positive for activity change, appetite change and fatigue. Negative for chills and fever.        Weight 151.9 kg (334 lb 14.1 oz) at March 13, 2018 visit.   Respiratory: Negative for cough, shortness of breath and wheezing.    Cardiovascular: Negative for chest pain, palpitations and leg swelling.   Gastrointestinal: Positive for diarrhea. Negative for abdominal pain, constipation, nausea and vomiting.   Endocrine: Positive for polyphagia. Negative for polydipsia and polyuria.        See history of present illness.   Genitourinary: Negative for difficulty urinating.   Musculoskeletal: Positive for arthralgias. Negative for back pain.        See history of present illness.   Skin:        See history of present illness.   Neurological: Positive for dizziness and numbness. Negative for headaches.        Positive for tingling.   Psychiatric/Behavioral: Negative for dysphoric mood and suicidal ideas. The patient is not nervous/anxious.         Negative for homicidal ideas.        Objective:  Physical Exam   Constitutional: She is oriented to person, place, and time. She appears well-developed and well-nourished. No distress.   Obese and pleasant.   Neck: Normal range of motion. Neck supple. No thyromegaly present.   Cardiovascular: Normal rate, regular rhythm, normal heart sounds and intact distal pulses.   No murmur heard.  Pulmonary/Chest: Effort normal and breath sounds normal. No respiratory distress. She has no wheezes.   Abdominal: Soft. Bowel sounds are normal. She exhibits no distension and no mass. There is no tenderness. There is no rebound and no guarding.   Musculoskeletal: Normal range of motion. She exhibits no edema or tenderness.   She is ambulatory without problems.   Lymphadenopathy:     She has no cervical  adenopathy.   Neurological: She is alert and oriented to person, place, and time.   Negative Tinel's at both wrists and slightly positive Phalen's at right but not at left wrist.   Skin: She is not diaphoretic.   Slight yellowing of right great toenail.   Psychiatric: She has a normal mood and affect. Her behavior is normal. Judgment and thought content normal.   Vitals reviewed.      ASSESSMENT:  1. Essential hypertension    2. Iron deficiency anemia, unspecified iron deficiency anemia type    3. Prediabetes    4. Vitamin D deficiency    5. Fatigue, unspecified type    6. Numbness and tingling in both hands    7. Need for influenza vaccination        PLAN:  Jenna was seen today for hypertension and follow-up.    Diagnoses and all orders for this visit:    Essential hypertension  -     Cancel: TSH; Future  -     Cancel: Comprehensive metabolic panel; Future  -     Comprehensive metabolic panel; Future  -     TSH; Future  -     Comprehensive metabolic panel  -     TSH    Iron deficiency anemia, unspecified iron deficiency anemia type  -     Cancel: CBC auto differential; Future  -     Cancel: Ferritin; Future  -     Ferritin; Future  -     CBC auto differential; Future  -     Ferritin  -     CBC auto differential    Prediabetes  -     Cancel: Hemoglobin A1c; Future  -     Hemoglobin A1c; Future  -     Hemoglobin A1c    Vitamin D deficiency  -     Cancel: Vitamin D; Future  -     Vitamin D; Future  -     Vitamin D    Fatigue, unspecified type  -     Cancel: TSH; Future  -     Cancel: CBC auto differential; Future  -     Cancel: Comprehensive metabolic panel; Future  -     Cancel: Vitamin D; Future  -     Cancel: Hemoglobin A1c; Future  -     Cancel: Ferritin; Future  -     Ferritin; Future  -     Hemoglobin A1c; Future  -     Vitamin D; Future  -     Comprehensive metabolic panel; Future  -     CBC auto differential; Future  -     TSH; Future  -     Ferritin  -     Hemoglobin A1c  -     Vitamin D  -      Comprehensive metabolic panel  -     CBC auto differential  -     TSH    Numbness and tingling in both hands  -     Nerve conduction test; Future    Need for influenza vaccination  -     Influenza - Quadrivalent (3 years & older) (PF)       Patient advised to call results.  Continue current medications, follow low sodium, low cholesterol, low carb diet, daily walks.  Mobility impairment form renewed/completed per patient request.  Keep follow up with specialists.  Okay to try OTC wrist splints to see if will help with hand/wrist/forearm symptom.  Work excuse today with return today.  Follow-up in about 6 months (around 3/13/2019) for physical.

## 2018-09-13 NOTE — LETTER
September 13, 2018      [facility]  [address]  [place] [address]  Phone: [phone]       Patient: Jenna Ovalle   YOB: 1970  Date of Visit: 09/13/2018    To Whom It May Concern:    Saul Ovalle  was at [facility] on 09/13/2018.She may return to work on 09/13/2018 with no restrictions. If you have any questions or concerns, or if I can be of further assistance, please do not hesitate to contact me.    Sincerely,    Franca Richmond MD

## 2018-09-14 LAB
25(OH)D3+25(OH)D2 SERPL-MCNC: 27.2 NG/ML (ref 30–100)
ALBUMIN SERPL-MCNC: 3.9 G/DL (ref 3.5–5.5)
ALBUMIN/GLOB SERPL: 1.1 {RATIO} (ref 1.2–2.2)
ALP SERPL-CCNC: 77 IU/L (ref 39–117)
ALT SERPL-CCNC: 13 IU/L (ref 0–32)
AST SERPL-CCNC: 12 IU/L (ref 0–40)
BASOPHILS # BLD AUTO: 0 X10E3/UL (ref 0–0.2)
BASOPHILS NFR BLD AUTO: 0 %
BILIRUB SERPL-MCNC: 0.3 MG/DL (ref 0–1.2)
BUN SERPL-MCNC: 10 MG/DL (ref 6–24)
BUN/CREAT SERPL: 16 (ref 9–23)
CALCIUM SERPL-MCNC: 9.1 MG/DL (ref 8.7–10.2)
CHLORIDE SERPL-SCNC: 102 MMOL/L (ref 96–106)
CO2 SERPL-SCNC: 23 MMOL/L (ref 20–29)
CREAT SERPL-MCNC: 0.64 MG/DL (ref 0.57–1)
EGFR IF AFRICAN AMERICAN: 122 ML/MIN/1.73
EOSINOPHIL # BLD AUTO: 0 X10E3/UL (ref 0–0.4)
EOSINOPHIL NFR BLD AUTO: 1 %
ERYTHROCYTE [DISTWIDTH] IN BLOOD BY AUTOMATED COUNT: 15.8 % (ref 12.3–15.4)
EST. GFR  (NON AFRICAN AMERICAN): 106 ML/MIN/1.73
FERRITIN SERPL-MCNC: 53 NG/ML (ref 15–150)
GLOBULIN SER CALC-MCNC: 3.5 G/DL (ref 1.5–4.5)
GLUCOSE SERPL-MCNC: 96 MG/DL (ref 65–99)
HBA1C MFR BLD: 6 % (ref 4.8–5.6)
HCT VFR BLD AUTO: 38.3 % (ref 34–46.6)
HGB BLD-MCNC: 11.9 G/DL (ref 11.1–15.9)
IMM GRANULOCYTES # BLD: 0 X10E3/UL (ref 0–0.1)
IMM GRANULOCYTES NFR BLD: 0 %
LYMPHOCYTES # BLD AUTO: 1.4 X10E3/UL (ref 0.7–3.1)
LYMPHOCYTES NFR BLD AUTO: 32 %
MCH RBC QN AUTO: 26.8 PG (ref 26.6–33)
MCHC RBC AUTO-ENTMCNC: 31.1 G/DL (ref 31.5–35.7)
MCV RBC AUTO: 86 FL (ref 79–97)
MONOCYTES # BLD AUTO: 0.3 X10E3/UL (ref 0.1–0.9)
MONOCYTES NFR BLD AUTO: 6 %
NEUTROPHILS # BLD AUTO: 2.8 X10E3/UL (ref 1.4–7)
NEUTROPHILS NFR BLD AUTO: 61 %
PLATELET # BLD AUTO: 416 X10E3/UL (ref 150–379)
POTASSIUM SERPL-SCNC: 4.4 MMOL/L (ref 3.5–5.2)
PROT SERPL-MCNC: 7.4 G/DL (ref 6–8.5)
RBC # BLD AUTO: 4.44 X10E6/UL (ref 3.77–5.28)
SODIUM SERPL-SCNC: 138 MMOL/L (ref 134–144)
TSH SERPL DL<=0.005 MIU/L-ACNC: 1.51 UIU/ML (ref 0.45–4.5)
WBC # BLD AUTO: 4.6 X10E3/UL (ref 3.4–10.8)

## 2018-10-29 RX ORDER — LOSARTAN POTASSIUM 50 MG/1
TABLET ORAL
Qty: 90 TABLET | Refills: 1 | Status: SHIPPED | OUTPATIENT
Start: 2018-10-29 | End: 2019-02-06 | Stop reason: SDUPTHER

## 2019-02-06 RX ORDER — LOSARTAN POTASSIUM 50 MG/1
TABLET ORAL
Qty: 90 TABLET | Refills: 0 | Status: SHIPPED | OUTPATIENT
Start: 2019-02-06 | End: 2019-08-05 | Stop reason: SDUPTHER

## 2019-02-07 ENCOUNTER — OFFICE VISIT (OUTPATIENT)
Dept: FAMILY MEDICINE | Facility: CLINIC | Age: 49
End: 2019-02-07
Payer: COMMERCIAL

## 2019-02-07 VITALS
OXYGEN SATURATION: 95 % | WEIGHT: 293 LBS | SYSTOLIC BLOOD PRESSURE: 136 MMHG | TEMPERATURE: 98 F | HEIGHT: 65 IN | HEART RATE: 84 BPM | DIASTOLIC BLOOD PRESSURE: 79 MMHG | BODY MASS INDEX: 48.82 KG/M2

## 2019-02-07 DIAGNOSIS — J30.9 ALLERGIC SINUSITIS: Primary | ICD-10-CM

## 2019-02-07 PROCEDURE — 99999 PR PBB SHADOW E&M-EST. PATIENT-LVL IV: CPT | Mod: PBBFAC,,, | Performed by: REGISTERED NURSE

## 2019-02-07 PROCEDURE — 3008F BODY MASS INDEX DOCD: CPT | Mod: CPTII,S$GLB,, | Performed by: REGISTERED NURSE

## 2019-02-07 PROCEDURE — 3075F SYST BP GE 130 - 139MM HG: CPT | Mod: CPTII,S$GLB,, | Performed by: REGISTERED NURSE

## 2019-02-07 PROCEDURE — 99999 PR PBB SHADOW E&M-EST. PATIENT-LVL IV: ICD-10-PCS | Mod: PBBFAC,,, | Performed by: REGISTERED NURSE

## 2019-02-07 PROCEDURE — 3078F DIAST BP <80 MM HG: CPT | Mod: CPTII,S$GLB,, | Performed by: REGISTERED NURSE

## 2019-02-07 PROCEDURE — 96372 PR INJECTION,THERAP/PROPH/DIAG2ST, IM OR SUBCUT: ICD-10-PCS | Mod: S$GLB,,, | Performed by: REGISTERED NURSE

## 2019-02-07 PROCEDURE — 3075F PR MOST RECENT SYSTOLIC BLOOD PRESS GE 130-139MM HG: ICD-10-PCS | Mod: CPTII,S$GLB,, | Performed by: REGISTERED NURSE

## 2019-02-07 PROCEDURE — 99214 OFFICE O/P EST MOD 30 MIN: CPT | Mod: 25,S$GLB,, | Performed by: REGISTERED NURSE

## 2019-02-07 PROCEDURE — 3078F PR MOST RECENT DIASTOLIC BLOOD PRESSURE < 80 MM HG: ICD-10-PCS | Mod: CPTII,S$GLB,, | Performed by: REGISTERED NURSE

## 2019-02-07 PROCEDURE — 99214 PR OFFICE/OUTPT VISIT, EST, LEVL IV, 30-39 MIN: ICD-10-PCS | Mod: 25,S$GLB,, | Performed by: REGISTERED NURSE

## 2019-02-07 PROCEDURE — 3008F PR BODY MASS INDEX (BMI) DOCUMENTED: ICD-10-PCS | Mod: CPTII,S$GLB,, | Performed by: REGISTERED NURSE

## 2019-02-07 PROCEDURE — 96372 THER/PROPH/DIAG INJ SC/IM: CPT | Mod: S$GLB,,, | Performed by: REGISTERED NURSE

## 2019-02-07 RX ORDER — PROMETHAZINE HYDROCHLORIDE AND DEXTROMETHORPHAN HYDROBROMIDE 6.25; 15 MG/5ML; MG/5ML
5 SYRUP ORAL
Qty: 118 ML | Refills: 0 | Status: SHIPPED | OUTPATIENT
Start: 2019-02-07 | End: 2019-03-14

## 2019-02-07 RX ORDER — AZITHROMYCIN 250 MG/1
TABLET, FILM COATED ORAL
Qty: 6 TABLET | Refills: 0 | Status: SHIPPED | OUTPATIENT
Start: 2019-02-07 | End: 2019-03-14

## 2019-02-07 RX ORDER — METHYLPREDNISOLONE ACETATE 80 MG/ML
80 INJECTION, SUSPENSION INTRA-ARTICULAR; INTRALESIONAL; INTRAMUSCULAR; SOFT TISSUE ONCE
Status: COMPLETED | OUTPATIENT
Start: 2019-02-07 | End: 2019-02-07

## 2019-02-07 RX ADMIN — METHYLPREDNISOLONE ACETATE 80 MG: 80 INJECTION, SUSPENSION INTRA-ARTICULAR; INTRALESIONAL; INTRAMUSCULAR; SOFT TISSUE at 04:02

## 2019-02-13 NOTE — PROGRESS NOTES
Subjective:       Patient ID: Jenna Ovalle is a 48 y.o. female.    Chief Complaint   Patient presents with    Cough    Sore Throat    Nasal Congestion       HPI    Jenna Ovalle is here today with c/o illness x 1 week, worse over past 3 days.  Reports sweats, PND, productive cough, HA pain and sinus pressure.  With c/o NC, RN, sore throat and sneezing.  Taking Zyrtec, Singulair and Flonase.      Review of Systems   Constitutional: Positive for diaphoresis. Negative for chills.   HENT: Positive for congestion, postnasal drip, rhinorrhea, sinus pain, sneezing and sore throat. Negative for ear pain.    Eyes: Negative.    Respiratory: Positive for cough. Negative for shortness of breath and wheezing.    Cardiovascular: Negative.    Allergic/Immunologic: Positive for environmental allergies.   Neurological: Positive for headaches. Negative for weakness and light-headedness.   Hematological: Negative for adenopathy.       Review of patient's allergies indicates:   Allergen Reactions    No known drug allergies        Patient Active Problem List   Diagnosis    Hypertension    Menorrhagia    AR (allergic rhinitis)    History of pulmonary embolism    Iron deficiency anemia    Morbid obesity with BMI of 50.0-59.9, adult    Arthritis of back    Prediabetes    Arthritis of knee    Vitamin D deficiency    Perimenopausal       Current Outpatient Medications on File Prior to Visit   Medication Sig Dispense Refill    aspirin (ECOTRIN) 81 MG EC tablet Take 81 mg by mouth once daily.      ergocalciferol (ERGOCALCIFEROL) 50,000 unit Cap TAKE 1 CAPSULE BY MOUTH EVERY 7 DAYS 4 capsule 5    fluticasone (FLONASE) 50 mcg/actuation nasal spray 2 sprays by Each Nare route once daily. (Patient taking differently: 2 sprays by Each Nare route daily as needed. ) 1 Bottle 0    IBUPROFEN/FAMOTIDINE (DUEXIS ORAL) Take 800 mg by mouth daily as needed.       iron-vitamin C 100-250 mg, ICAR-C, (FE C) 100-250 mg Tab  "Take 1 tablet by mouth once daily. 30 tablet 5    levocetirizine (XYZAL) 5 MG tablet Take 1 tablet (5 mg total) by mouth every evening. 30 tablet 5    losartan (COZAAR) 50 MG tablet TAKE 1 TABLET(50 MG) BY MOUTH EVERY DAY 90 tablet 0    metFORMIN (GLUCOPHAGE-XR) 500 MG 24 hr tablet TAKE 1 TABLET BY MOUTH WITH DINNER FOR 1 WEEK, THEN TAKE 2 TABLETS BY MOUTH WITH DINNER 60 tablet 3    multivitamin (ONE DAILY MULTIVITAMIN) per tablet Take 1 tablet by mouth once daily.       No current facility-administered medications on file prior to visit.        Past medical, surgical, family and social histories have been reviewed today.        Objective:     Vitals:    02/07/19 1630   BP: 136/79   Pulse: 84   Temp: 97.8 °F (36.6 °C)   TempSrc: Oral   SpO2: 95%   Weight: (!) 162.1 kg (357 lb 5.9 oz)   Height: 5' 5" (1.651 m)   PainSc: 0-No pain         Physical Exam   Constitutional: She is oriented to person, place, and time. She appears well-developed and well-nourished.   HENT:   Head: Normocephalic and atraumatic.   Right Ear: Tympanic membrane normal.   Left Ear: Tympanic membrane normal.   Nose: Mucosal edema and rhinorrhea (boggy) present. Right sinus exhibits frontal sinus tenderness. Left sinus exhibits frontal sinus tenderness.   Mouth/Throat: Mucous membranes are normal. No oropharyngeal exudate, posterior oropharyngeal edema or posterior oropharyngeal erythema (clear PND noted).   Eyes: Conjunctivae and EOM are normal. Pupils are equal, round, and reactive to light. Right eye exhibits no discharge. Left eye exhibits no discharge.   Cardiovascular: Normal rate and regular rhythm.   Pulmonary/Chest: Effort normal and breath sounds normal.   Lymphadenopathy:     She has no cervical adenopathy.   Neurological: She is alert and oriented to person, place, and time.   Vitals reviewed.        Diagnosis       1. Allergic sinusitis          Assessment/ Plan     Allergic sinusitis  -     methylPREDNISolone acetate injection 80 " mg  -     promethazine-dextromethorphan (PROMETHAZINE-DM) 6.25-15 mg/5 mL Syrp; Take 5 mLs by mouth every 4 to 6 hours as needed.  Dispense: 118 mL; Refill: 0  -     azithromycin (Z-JEFFREY) 250 MG tablet; Take 2 tabs by mouth today, then 1 tab daily x 4 days.  Dispense: 6 tablet; Refill: 0        Medication discussed, as directed.  Injection today.  Rest & fluids.  Follow-up in clinic as needed.        YAHIR Campbell  [facility]

## 2019-02-22 ENCOUNTER — PATIENT MESSAGE (OUTPATIENT)
Dept: FAMILY MEDICINE | Facility: CLINIC | Age: 49
End: 2019-02-22

## 2019-02-24 DIAGNOSIS — H50.9 STRABISMUS: Primary | ICD-10-CM

## 2019-02-24 DIAGNOSIS — I10 ESSENTIAL HYPERTENSION: ICD-10-CM

## 2019-03-14 ENCOUNTER — OFFICE VISIT (OUTPATIENT)
Dept: FAMILY MEDICINE | Facility: CLINIC | Age: 49
End: 2019-03-14
Payer: COMMERCIAL

## 2019-03-14 VITALS
WEIGHT: 293 LBS | TEMPERATURE: 98 F | HEIGHT: 65 IN | BODY MASS INDEX: 48.82 KG/M2 | RESPIRATION RATE: 17 BRPM | DIASTOLIC BLOOD PRESSURE: 74 MMHG | HEART RATE: 96 BPM | OXYGEN SATURATION: 95 % | SYSTOLIC BLOOD PRESSURE: 110 MMHG

## 2019-03-14 DIAGNOSIS — R73.03 PREDIABETES: ICD-10-CM

## 2019-03-14 DIAGNOSIS — E66.01 MORBID OBESITY WITH BMI OF 50.0-59.9, ADULT: ICD-10-CM

## 2019-03-14 DIAGNOSIS — J30.2 SEASONAL ALLERGIC RHINITIS, UNSPECIFIED TRIGGER: ICD-10-CM

## 2019-03-14 DIAGNOSIS — M17.10 ARTHRITIS OF KNEE: ICD-10-CM

## 2019-03-14 DIAGNOSIS — Z86.711 HISTORY OF PULMONARY EMBOLISM: ICD-10-CM

## 2019-03-14 DIAGNOSIS — N95.1 PERIMENOPAUSAL: ICD-10-CM

## 2019-03-14 DIAGNOSIS — I10 ESSENTIAL HYPERTENSION: ICD-10-CM

## 2019-03-14 DIAGNOSIS — E55.9 VITAMIN D DEFICIENCY: ICD-10-CM

## 2019-03-14 DIAGNOSIS — Z00.00 ANNUAL PHYSICAL EXAM: Primary | ICD-10-CM

## 2019-03-14 DIAGNOSIS — D50.9 IRON DEFICIENCY ANEMIA, UNSPECIFIED IRON DEFICIENCY ANEMIA TYPE: ICD-10-CM

## 2019-03-14 PROCEDURE — 99999 PR PBB SHADOW E&M-EST. PATIENT-LVL III: ICD-10-PCS | Mod: PBBFAC,,, | Performed by: FAMILY MEDICINE

## 2019-03-14 PROCEDURE — 99396 PREV VISIT EST AGE 40-64: CPT | Mod: S$GLB,,, | Performed by: FAMILY MEDICINE

## 2019-03-14 PROCEDURE — 99396 PR PREVENTIVE VISIT,EST,40-64: ICD-10-PCS | Mod: S$GLB,,, | Performed by: FAMILY MEDICINE

## 2019-03-14 PROCEDURE — 3074F PR MOST RECENT SYSTOLIC BLOOD PRESSURE < 130 MM HG: ICD-10-PCS | Mod: CPTII,S$GLB,, | Performed by: FAMILY MEDICINE

## 2019-03-14 PROCEDURE — 3074F SYST BP LT 130 MM HG: CPT | Mod: CPTII,S$GLB,, | Performed by: FAMILY MEDICINE

## 2019-03-14 PROCEDURE — 3078F PR MOST RECENT DIASTOLIC BLOOD PRESSURE < 80 MM HG: ICD-10-PCS | Mod: CPTII,S$GLB,, | Performed by: FAMILY MEDICINE

## 2019-03-14 PROCEDURE — 3078F DIAST BP <80 MM HG: CPT | Mod: CPTII,S$GLB,, | Performed by: FAMILY MEDICINE

## 2019-03-14 PROCEDURE — 99999 PR PBB SHADOW E&M-EST. PATIENT-LVL III: CPT | Mod: PBBFAC,,, | Performed by: FAMILY MEDICINE

## 2019-03-14 RX ORDER — FERROUS GLUCONATE 324(38)MG
324 TABLET ORAL
COMMUNITY
End: 2019-11-21 | Stop reason: HOSPADM

## 2019-03-14 RX ORDER — LISINOPRIL 10 MG/1
10 TABLET ORAL
COMMUNITY
End: 2019-03-14

## 2019-03-14 RX ORDER — VIT C/E/ZN/COPPR/LUTEIN/ZEAXAN 250MG-90MG
1000 CAPSULE ORAL DAILY
COMMUNITY
End: 2020-07-14

## 2019-03-14 NOTE — LETTER
March 14, 2019      [facility]  [address]  [place] [address]  Phone: [phone]  Fax: [phone]       Patient: Jenna Ovalle   YOB: 1970  Date of Visit: 03/14/2019    To Whom It May Concern:    Saul Ovalle  was at [facility] on 03/14/2019. She may return to work on 03/14/2019 with no restrictions. If you have any questions or concerns, or if I can be of further assistance, please do not hesitate to contact me.    Sincerely,    Franca Richmond MD

## 2019-03-14 NOTE — PROGRESS NOTES
HISTORY OF PRESENT ILLNESS: Ms. Ovalle comes in today fasting and with taking medications for annual wellness examination.     END OF LIFE DECISION: She has a living will and does desire life support.    Current Outpatient Medications   Medication Sig    aspirin (ECOTRIN) 81 MG EC tablet Take 81 mg by mouth once daily.    cholecalciferol, vitamin D3, (VITAMIN D3) 1,000 unit capsule Take 1,000 Units by mouth once daily.    ergocalciferol (ERGOCALCIFEROL) 50,000 unit Cap TAKE 1 CAPSULE BY MOUTH EVERY 7 DAYS    ferrous gluconate (FERGON) 324 MG tablet Take 324 mg by mouth daily with breakfast.    fluticasone (FLONASE) 50 mcg/actuation nasal spray 2 sprays by Each Nare route once daily. (Patient taking differently: 2 sprays by Each Nare route daily as needed. )    IBUPROFEN/FAMOTIDINE (DUEXIS ORAL) Take 800 mg by mouth daily as needed.     levocetirizine (XYZAL) 5 MG tablet Take 1 tablet (5 mg total) by mouth every evening.    losartan (COZAAR) 50 MG tablet TAKE 1 TABLET(50 MG) BY MOUTH EVERY DAY    metFORMIN (GLUCOPHAGE-XR) 500 MG 24 hr tablet TAKE 1 TABLET BY MOUTH WITH DINNER FOR 1 WEEK, THEN TAKE 2 TABLETS BY MOUTH WITH DINNER (Patient taking differently: TAKE 2 TABLETS BY MOUTH WITH DINNER)     SCREENINGS:   Cholesterol: March 13, 2018.  FFS/Colonoscopy: Never.  Mammogram:April 2018 with GYN Dr. Kiesha hernandez per patient. Scheduled for April 2018.  GYN Exam: April 2018 with GYN Dr. Kiesha hernandez per patient. Scheduled for April 2018.  Dexa Scan: Never.  Eye Exam: February 2018 with Dr. Jones per patient. Scheduled to see Dr. Kwon on March 26, 2019.She wears glasses.  PPD: Negative in the past per patient.  Immunizations: Tdap - March 4, 2015.  Gardasil - N./A.  Zostavax - N./A.  Pneumovax - In the past.  Seasonal Flu - September 13, 2018.     ROS:  GENERAL: Denies fever, chills or unusual weight change. Appetite normal. Exercises very little due to knee issue but reports cutting back as not eats  dinner. Weight 155 kg (341 lb 11.4 oz) at September 13, 2018 visit. Reports always with fatigue but reports some non-compliance with iron therapy and works a lot of hours.  SKIN: Denies rashes, itching, changes in mole, color or texture of skin or easy bruising. Reports only wears compression stockings for varicose veins if flying.  HEAD: Denies headaches or recent head trauma.  EYES: Denies change in vision, pain, diplopia, redness or discharge. Wears glasses.  EARS: Denies ear pain, discharge, vertigo or decreased hearing.  NOSE: Denies loss of smell, epistaxis or rhinitis.  MOUTH & THROAT: Denies hoarseness or change in voice. Denies excessive gum bleeding or mouth sores. Denies sore throat.   NODES: Denies swollen glands.  CHEST: Denies ROSARIO, wheezing, cough or sputum production. Reports no longer follows with Dr. Mata, pulmonologist, for surveillance of pulmonary embolism and last saw him 2013.   CARDIOVASCULAR: Denies chest pain, PND, orthopnea or reduced exercise tolerance. Denies palpitations.  ABDOMEN: Denies unusual diarrhea (as reports occasionally following gall bladder removal > 10 years ago), constipation, nausea, vomiting, or blood in stool. Reports occasional abdominal pain due to Metformin use or due to fibroids.  URINARY: Denies flank pain, dysuria or hematuria.  GENITOURINARY: Denies flank pain, dysuria, frequency or hematuria. Reports perimenopausal. No change in menses. Occasionally performs monthly breast self examination.   ENDOCRINE: Denies diabetes, thyroid or cholesterol problems. Reports saw Dr. Booker, endocrinologist, on May 3, 2018 for surveillance of pre diabetes with 1-year follow-up advised.   HEME/LYMPH: Denies bleeding problems. Follows with Dr. Pascual, hematologist, prn with last visit > 1 year ago for surveillance of Lupus anticoagulant disorder; she is now only on EC ASA 81 mg daily since Xarelto was discontinued April 2014.  PERIPHERAL VASCULAR:Denies claudication or  "cyanosis.  MUSCULOSKELETAL: Denies joint stiffness, pain or swelling except reports intermittent non-traumatic pain at both of knees; follows with Dr. Bubba Quiñonez, orthopedist, with last visit in December 2017 and treated with gel injections into right knee with help for pain but states saw his associate Dr. Queen last year with shot given at that time and helped.Also, reports occasional bilateral hip pains not associated with trauma. Denies edema.  NEUROLOGIC: Denies history of seizures, tremors, paralysis, alteration of gait or coordination.  PSYCHIATRIC: Denies mood swings, depression, anxiety, homicidal or suicidal thoughts. Denies sleep problems.    PE:   VS: /74 (BP Location: Right arm, Patient Position: Sitting, BP Method: Large (Manual))   Pulse 96   Temp 97.5 °F (36.4 °C) (Oral)   Resp 17   Ht 5' 5" (1.651 m)   Wt (!) 156 kg (343 lb 12.9 oz)   SpO2 95%   BMI 57.21 kg/m²   APPEARANCE: Well nourished, well developed female, obese and pleasant, alert and oriented in no acute distress.   HEAD: Non tender. Full range of motion.  EYES: PERRL, conjunctiva pink, lids no edema.  EARS: External canal patent, no swelling or redness. TM's shiny and clear.  NOSE: Mucosa and turbinates pink, not swollen. No discharge. Non tender sinuses.  THROAT: No pharyngeal erythema or exudate. No stridor.   NECK: Supple, no mass, thyroid not enlarged.  NODES: No cervical lymph node enlargement.  CHEST: Normal respiratory effort. Lungs clear to auscultation.  CARDIOVASCULAR: Normal S1, S2. No rubs, murmurs or gallops. PMI not displaced. No carotid bruit. Pedal pulses palpable bilaterally. No edema.  ABDOMEN: Bowel sounds present. Not distended. Soft. No tenderness, masses or organomegaly.  BREAST EXAM: Not examined but deferred to GYN.  PELVIC EXAM: Not examined but deferred to GYN.  RECTAL EXAM: Not examined.  MUSCULOSKELETAL: No joint deformities or stiffness. She is ambulatory without problems.   SKIN: No rashes or " suspicious lesions, normal color and turgor. Non tender slight varicose veins at lower legs.  NEUROLOGIC: Cranial Nerves: II-XII grossly intact. DTR's: Knees, Ankles 2+ and equal bilaterally. Gait & Posture: Normal gait and fine motion.     ASSESSMENT:    ICD-10-CM ICD-9-CM    1. Annual physical exam Z00.00 V70.0 CBC auto differential      TSH      Urinalysis      Comprehensive metabolic panel      Lipid panel      Vitamin D      Ferritin      Hemoglobin A1c   2. Essential hypertension I10 401.9    3. Iron deficiency anemia, unspecified iron deficiency anemia type D50.9 280.9    4. Prediabetes R73.03 790.29    5. Vitamin D deficiency E55.9 268.9    6. Arthritis of knee M17.10 716.96    7. Seasonal allergic rhinitis, unspecified trigger J30.2 477.9    8. History of pulmonary embolism Z86.711 V12.55    9. Perimenopausal N95.1 627.2    10. Morbid obesity with BMI of 50.0-59.9, adult E66.01 278.01     Z68.43 V85.43      PLAN:  1. Age-appropriate counseling-appropriate low-sodium, low-cholesterol, low carbohydrate diet and exercise daily, monthly breast self exam, annual wellness examination.   2. Patient advised to call for results.  3. Continue current medications.  4. Keep follow up with specialists.  5. Follow-up in about 6 months (around 9/12/2019) for hypertension follow up.

## 2019-03-26 ENCOUNTER — OFFICE VISIT (OUTPATIENT)
Dept: OPHTHALMOLOGY | Facility: CLINIC | Age: 49
End: 2019-03-26
Payer: COMMERCIAL

## 2019-03-26 DIAGNOSIS — H50.112 EXOTROPIA OF LEFT EYE: Primary | ICD-10-CM

## 2019-03-26 DIAGNOSIS — H18.603 KERATOCONUS OF BOTH EYES: ICD-10-CM

## 2019-03-26 DIAGNOSIS — E88.810 METABOLIC SYNDROME X: ICD-10-CM

## 2019-03-26 DIAGNOSIS — H52.7 REFRACTION DISORDER: ICD-10-CM

## 2019-03-26 PROCEDURE — 99999 PR PBB SHADOW E&M-EST. PATIENT-LVL I: CPT | Mod: PBBFAC,,, | Performed by: OPHTHALMOLOGY

## 2019-03-26 PROCEDURE — 92015 PR REFRACTION: ICD-10-PCS | Mod: S$GLB,,, | Performed by: OPHTHALMOLOGY

## 2019-03-26 PROCEDURE — 92004 COMPRE OPH EXAM NEW PT 1/>: CPT | Mod: S$GLB,,, | Performed by: OPHTHALMOLOGY

## 2019-03-26 PROCEDURE — 99999 PR PBB SHADOW E&M-EST. PATIENT-LVL I: ICD-10-PCS | Mod: PBBFAC,,, | Performed by: OPHTHALMOLOGY

## 2019-03-26 PROCEDURE — 92015 DETERMINE REFRACTIVE STATE: CPT | Mod: S$GLB,,, | Performed by: OPHTHALMOLOGY

## 2019-03-26 PROCEDURE — 92004 PR EYE EXAM, NEW PATIENT,COMPREHESV: ICD-10-PCS | Mod: S$GLB,,, | Performed by: OPHTHALMOLOGY

## 2019-03-26 NOTE — PROGRESS NOTES
HPI     Patient is here to establish care , patient saw Dr. Familia Jones approx.   6 weeks ago , patient has a h/o stabismus first noticed it 6 years ago at   [facility], she was also told she had the beginning of   Keratoconus ? But she has been a patient of Dr. Jones for 6 years and he   never mentioned it.  Denies diplopia.        NP-Referred by Dr. Richmond  H/o Strabismus 2012  +DM 2016                            NP-Referred by Dr. Richmond  H/o Strabismus 2012  +DM     Last edited by Greg Kwon MD on 3/26/2019  9:46 AM. (History)            Assessment /Plan     For exam results, see Encounter Report.      ICD-10-CM ICD-9-CM    1. Exotropia of left eye H50.10 378.10 Not symptomatic - follow  Discussed condition   2. Metabolic syndrome X E88.81 277.7 No retinopathy OU   3. Refraction disorder H52.7 367.9 Disp Mr   4. Keratoconus of both eyes H18.603 371.60 ara not done today since patient was seeing well with glasses       Disp Mr  Return to clinic 1 year - 2 years with DL

## 2019-03-26 NOTE — LETTER
March 26, 2019      Franca Richmond MD  [address] [place] [address]           [facility]  [address]  [place] [address]  Phone: [phone]  Fax: [phone]          Patient: Jenna Ovalle   MR Number: [id number]   YOB: 1970   Date of Visit: 3/26/2019       Dear Dr. Franca Richmond:    Thank you for referring Jenna Ovalle to me for evaluation. Attached you will find relevant portions of my assessment and plan of care.    If you have questions, please do not hesitate to call me. I look forward to following Jenna Ovalle along with you.    Sincerely,    Greg Kwon MD    Enclosure  CC:  No Recipients    If you would like to receive this communication electronically, please contact [email or web address] or [phone] to request more information on [employer] Link access.    For providers and/or their staff who would like to refer a patient to [facility], please contact us through our one-stop-shop provider referral line, [facility], at [phone].    If you feel you have received this communication in error or would no longer like to receive these types of communications, please e-mail [email or web address]

## 2019-04-17 RX ORDER — ERGOCALCIFEROL 1.25 MG/1
CAPSULE ORAL
Qty: 4 CAPSULE | Refills: 5 | Status: SHIPPED | OUTPATIENT
Start: 2019-04-17 | End: 2019-11-23 | Stop reason: SDUPTHER

## 2019-05-07 ENCOUNTER — TELEPHONE (OUTPATIENT)
Dept: FAMILY MEDICINE | Facility: CLINIC | Age: 49
End: 2019-05-07

## 2019-05-07 NOTE — TELEPHONE ENCOUNTER
----- Message from Shiv Vieyra sent at 5/7/2019  9:51 AM CDT -----  Contact: Pt  Pt is requesting orders for Annual Blood work. Pt would like lab work done at an [facility] lab for this time.     Pt can be reached at [phone].

## 2019-05-14 ENCOUNTER — OFFICE VISIT (OUTPATIENT)
Dept: FAMILY MEDICINE | Facility: CLINIC | Age: 49
End: 2019-05-14
Payer: COMMERCIAL

## 2019-05-14 ENCOUNTER — HOSPITAL ENCOUNTER (OUTPATIENT)
Dept: RADIOLOGY | Facility: HOSPITAL | Age: 49
Discharge: HOME | End: 2019-05-14
Attending: REGISTERED NURSE
Payer: COMMERCIAL

## 2019-05-14 VITALS
OXYGEN SATURATION: 99 % | TEMPERATURE: 98 F | BODY MASS INDEX: 56.57 KG/M2 | RESPIRATION RATE: 20 BRPM | SYSTOLIC BLOOD PRESSURE: 124 MMHG | WEIGHT: 293 LBS | HEART RATE: 74 BPM | DIASTOLIC BLOOD PRESSURE: 76 MMHG

## 2019-05-14 DIAGNOSIS — N62 BREAST HYPERTROPHY: ICD-10-CM

## 2019-05-14 DIAGNOSIS — R07.9 CHEST PAIN, UNSPECIFIED TYPE: ICD-10-CM

## 2019-05-14 DIAGNOSIS — M79.18 MUSCULOSKELETAL PAIN: ICD-10-CM

## 2019-05-14 DIAGNOSIS — Z86.711 HISTORY OF PULMONARY EMBOLISM: ICD-10-CM

## 2019-05-14 DIAGNOSIS — M79.18 MUSCULOSKELETAL PAIN: Primary | ICD-10-CM

## 2019-05-14 PROCEDURE — 93005 EKG 12-LEAD: ICD-10-PCS | Mod: S$GLB,,, | Performed by: REGISTERED NURSE

## 2019-05-14 PROCEDURE — 3074F SYST BP LT 130 MM HG: CPT | Mod: CPTII,S$GLB,, | Performed by: REGISTERED NURSE

## 2019-05-14 PROCEDURE — 71046 X-RAY EXAM CHEST 2 VIEWS: CPT | Mod: 26,,, | Performed by: RADIOLOGY

## 2019-05-14 PROCEDURE — 3078F DIAST BP <80 MM HG: CPT | Mod: CPTII,S$GLB,, | Performed by: REGISTERED NURSE

## 2019-05-14 PROCEDURE — 93005 ELECTROCARDIOGRAM TRACING: CPT | Mod: S$GLB,,, | Performed by: REGISTERED NURSE

## 2019-05-14 PROCEDURE — 71046 X-RAY EXAM CHEST 2 VIEWS: CPT | Mod: TC,FY,PO

## 2019-05-14 PROCEDURE — 99214 PR OFFICE/OUTPT VISIT, EST, LEVL IV, 30-39 MIN: ICD-10-PCS | Mod: 25,S$GLB,, | Performed by: REGISTERED NURSE

## 2019-05-14 PROCEDURE — 3074F PR MOST RECENT SYSTOLIC BLOOD PRESSURE < 130 MM HG: ICD-10-PCS | Mod: CPTII,S$GLB,, | Performed by: REGISTERED NURSE

## 2019-05-14 PROCEDURE — 99999 PR PBB SHADOW E&M-EST. PATIENT-LVL IV: ICD-10-PCS | Mod: PBBFAC,,, | Performed by: REGISTERED NURSE

## 2019-05-14 PROCEDURE — 3008F BODY MASS INDEX DOCD: CPT | Mod: CPTII,S$GLB,, | Performed by: REGISTERED NURSE

## 2019-05-14 PROCEDURE — 3008F PR BODY MASS INDEX (BMI) DOCUMENTED: ICD-10-PCS | Mod: CPTII,S$GLB,, | Performed by: REGISTERED NURSE

## 2019-05-14 PROCEDURE — 99999 PR PBB SHADOW E&M-EST. PATIENT-LVL IV: CPT | Mod: PBBFAC,,, | Performed by: REGISTERED NURSE

## 2019-05-14 PROCEDURE — 3078F PR MOST RECENT DIASTOLIC BLOOD PRESSURE < 80 MM HG: ICD-10-PCS | Mod: CPTII,S$GLB,, | Performed by: REGISTERED NURSE

## 2019-05-14 PROCEDURE — 93010 ELECTROCARDIOGRAM REPORT: CPT | Mod: S$GLB,,, | Performed by: INTERNAL MEDICINE

## 2019-05-14 PROCEDURE — 99214 OFFICE O/P EST MOD 30 MIN: CPT | Mod: 25,S$GLB,, | Performed by: REGISTERED NURSE

## 2019-05-14 PROCEDURE — 93010 EKG 12-LEAD: ICD-10-PCS | Mod: S$GLB,,, | Performed by: INTERNAL MEDICINE

## 2019-05-14 PROCEDURE — 71046 XR CHEST PA AND LATERAL: ICD-10-PCS | Mod: 26,,, | Performed by: RADIOLOGY

## 2019-05-14 RX ORDER — TIZANIDINE 4 MG/1
4 TABLET ORAL 3 TIMES DAILY PRN
Qty: 45 TABLET | Refills: 0 | Status: SHIPPED | OUTPATIENT
Start: 2019-05-14 | End: 2020-01-08

## 2019-05-14 NOTE — PROGRESS NOTES
Subjective:       Patient ID: Jenna Ovalle is a 49 y.o. female.    Chief Complaint   Patient presents with    Flank Pain    Back Pain       HPI    Jenna Ovalle is here today with c/o back and side pain x 1 week.  No injury or trauma reported.  Massage has helped.  Reports pain to upper back and RT lateral chest, shoots across chest wall at times.  She has been alternating btw  and ibuprofen 800.  Pain not worse with DB.  Does mention having heavy sagging breasts that often pull her neck and upper back.      Review of Systems   Constitutional: Negative.    Respiratory: Negative.    Cardiovascular: Positive for chest pain. Negative for palpitations and leg swelling.   Musculoskeletal: Positive for back pain and neck pain. Negative for gait problem, joint swelling and neck stiffness.   Skin: Negative.    Neurological: Negative.        Review of patient's allergies indicates:   Allergen Reactions    No known drug allergies        Patient Active Problem List   Diagnosis    Hypertension    Menorrhagia    AR (allergic rhinitis)    History of pulmonary embolism    Iron deficiency anemia    Morbid obesity with BMI of 50.0-59.9, adult    Arthritis of back    Prediabetes    Arthritis of knee    Vitamin D deficiency    Perimenopausal    Metabolic syndrome X    Exotropia of left eye       Current Outpatient Medications on File Prior to Visit   Medication Sig Dispense Refill    aspirin (ECOTRIN) 81 MG EC tablet Take 81 mg by mouth once daily.      cholecalciferol, vitamin D3, (VITAMIN D3) 1,000 unit capsule Take 1,000 Units by mouth once daily.      ergocalciferol (ERGOCALCIFEROL) 50,000 unit Cap TAKE 1 CAPSULE BY MOUTH EVERY 7 DAYS 4 capsule 5    ferrous gluconate (FERGON) 324 MG tablet Take 324 mg by mouth daily with breakfast.      IBUPROFEN/FAMOTIDINE (DUEXIS ORAL) Take 800 mg by mouth daily as needed.       losartan (COZAAR) 50 MG tablet TAKE 1 TABLET(50 MG) BY MOUTH EVERY DAY 90  tablet 0    metFORMIN (GLUCOPHAGE-XR) 500 MG 24 hr tablet TAKE 1 TABLET BY MOUTH WITH DINNER FOR 1 WEEK, THEN TAKE 2 TABLETS BY MOUTH WITH DINNER (Patient taking differently: TAKE 2 TABLETS BY MOUTH WITH DINNER) 60 tablet 3    fluticasone (FLONASE) 50 mcg/actuation nasal spray 2 sprays by Each Nare route once daily. (Patient taking differently: 2 sprays by Each Nare route daily as needed. ) 1 Bottle 0    levocetirizine (XYZAL) 5 MG tablet Take 1 tablet (5 mg total) by mouth every evening. 30 tablet 5     No current facility-administered medications on file prior to visit.        Past medical, surgical, family and social histories have been reviewed today.        Objective:     Vitals:    05/14/19 1559   BP: 124/76   Pulse: 74   Resp: 20   Temp: 98.4 °F (36.9 °C)   SpO2: 99%   Weight: (!) 154.2 kg (339 lb 15.2 oz)   PainSc:   4   PainLoc: Back         Physical Exam   Constitutional: She is oriented to person, place, and time. She appears well-developed and well-nourished. No distress.   Cardiovascular: Normal rate, regular rhythm and normal heart sounds. Exam reveals no gallop and no friction rub.   No murmur heard.  Pulmonary/Chest: Effort normal and breath sounds normal. No respiratory distress. She has no wheezes. She has no rales. She exhibits no mass, no tenderness, no crepitus, no edema, no deformity, no swelling and no retraction.       Musculoskeletal: Normal range of motion. She exhibits no edema.        Thoracic back: She exhibits tenderness. She exhibits normal range of motion, no swelling, no edema and no deformity.        Back:    Neurological: She is alert and oriented to person, place, and time. No sensory deficit. She exhibits normal muscle tone. Coordination normal.   Skin: Skin is warm and dry. Capillary refill takes less than 2 seconds. No rash noted. She is not diaphoretic.   Psychiatric: She has a normal mood and affect. Her behavior is normal. Judgment and thought content normal.   Vitals  reviewed.        12-lead EKG today in office shows a SB w/ rate of 52.      Diagnosis       1. Musculoskeletal pain    2. Chest pain, unspecified type    3. History of pulmonary embolism    4. Breast hypertrophy          Assessment/ Plan     Musculoskeletal pain  -     X-Ray Chest PA And Lateral; Future; Expected date: 05/14/2019  -     tiZANidine (ZANAFLEX) 4 MG tablet; Take 1 tablet (4 mg total) by mouth 3 (three) times daily as needed.  Dispense: 45 tablet; Refill: 0    Chest pain, unspecified type  -     X-Ray Chest PA And Lateral; Future; Expected date: 05/14/2019  -     IN OFFICE EKG 12-LEAD (to Muse)    History of pulmonary embolism    Breast hypertrophy  · May consider seeing a Breast Specialist if issues worsen or persist.        Ice/heat, stretching exercises.  CXR pending.  Follow-up in clinic as needed.        YAHIR Campbell  [facility]

## 2019-08-05 RX ORDER — LOSARTAN POTASSIUM 50 MG/1
TABLET ORAL
Qty: 90 TABLET | Refills: 1 | Status: SHIPPED | OUTPATIENT
Start: 2019-08-05 | End: 2020-02-21

## 2019-09-12 ENCOUNTER — PATIENT OUTREACH (OUTPATIENT)
Dept: ADMINISTRATIVE | Facility: HOSPITAL | Age: 49
End: 2019-09-12

## 2019-09-12 NOTE — PROGRESS NOTES
PreVisit Chart Audit Perfomed         Vira GEIGER LPN Care Coordinator  Care Coordination Department  [facility]  [phone]

## 2019-09-19 ENCOUNTER — OFFICE VISIT (OUTPATIENT)
Dept: FAMILY MEDICINE | Facility: CLINIC | Age: 49
End: 2019-09-19
Payer: COMMERCIAL

## 2019-09-19 VITALS
WEIGHT: 293 LBS | OXYGEN SATURATION: 99 % | HEIGHT: 65 IN | HEART RATE: 105 BPM | BODY MASS INDEX: 48.82 KG/M2 | TEMPERATURE: 98 F | SYSTOLIC BLOOD PRESSURE: 116 MMHG | DIASTOLIC BLOOD PRESSURE: 74 MMHG

## 2019-09-19 DIAGNOSIS — Z23 NEED FOR INFLUENZA VACCINATION: ICD-10-CM

## 2019-09-19 DIAGNOSIS — M17.10 ARTHRITIS OF KNEE: ICD-10-CM

## 2019-09-19 DIAGNOSIS — E88.810 METABOLIC SYNDROME X: ICD-10-CM

## 2019-09-19 DIAGNOSIS — N95.1 PERIMENOPAUSAL: ICD-10-CM

## 2019-09-19 DIAGNOSIS — R73.03 PREDIABETES: ICD-10-CM

## 2019-09-19 DIAGNOSIS — D50.9 IRON DEFICIENCY ANEMIA, UNSPECIFIED IRON DEFICIENCY ANEMIA TYPE: ICD-10-CM

## 2019-09-19 DIAGNOSIS — E66.01 MORBID OBESITY WITH BMI OF 50.0-59.9, ADULT: ICD-10-CM

## 2019-09-19 DIAGNOSIS — E55.9 VITAMIN D DEFICIENCY: ICD-10-CM

## 2019-09-19 DIAGNOSIS — I10 ESSENTIAL HYPERTENSION: ICD-10-CM

## 2019-09-19 PROCEDURE — 99999 PR PBB SHADOW E&M-EST. PATIENT-LVL IV: ICD-10-PCS | Mod: PBBFAC,,, | Performed by: FAMILY MEDICINE

## 2019-09-19 PROCEDURE — 99214 OFFICE O/P EST MOD 30 MIN: CPT | Mod: 25,S$GLB,, | Performed by: FAMILY MEDICINE

## 2019-09-19 PROCEDURE — 90686 FLU VACCINE (QUAD) GREATER THAN OR EQUAL TO 3YO PRESERVATIVE FREE IM: ICD-10-PCS | Mod: S$GLB,,, | Performed by: FAMILY MEDICINE

## 2019-09-19 PROCEDURE — 3008F PR BODY MASS INDEX (BMI) DOCUMENTED: ICD-10-PCS | Mod: CPTII,S$GLB,, | Performed by: FAMILY MEDICINE

## 2019-09-19 PROCEDURE — 90471 IMMUNIZATION ADMIN: CPT | Mod: S$GLB,,, | Performed by: FAMILY MEDICINE

## 2019-09-19 PROCEDURE — 99214 PR OFFICE/OUTPT VISIT, EST, LEVL IV, 30-39 MIN: ICD-10-PCS | Mod: 25,S$GLB,, | Performed by: FAMILY MEDICINE

## 2019-09-19 PROCEDURE — 3008F BODY MASS INDEX DOCD: CPT | Mod: CPTII,S$GLB,, | Performed by: FAMILY MEDICINE

## 2019-09-19 PROCEDURE — 90471 FLU VACCINE (QUAD) GREATER THAN OR EQUAL TO 3YO PRESERVATIVE FREE IM: ICD-10-PCS | Mod: S$GLB,,, | Performed by: FAMILY MEDICINE

## 2019-09-19 PROCEDURE — 3078F PR MOST RECENT DIASTOLIC BLOOD PRESSURE < 80 MM HG: ICD-10-PCS | Mod: CPTII,S$GLB,, | Performed by: FAMILY MEDICINE

## 2019-09-19 PROCEDURE — 3074F PR MOST RECENT SYSTOLIC BLOOD PRESSURE < 130 MM HG: ICD-10-PCS | Mod: CPTII,S$GLB,, | Performed by: FAMILY MEDICINE

## 2019-09-19 PROCEDURE — 99999 PR PBB SHADOW E&M-EST. PATIENT-LVL IV: CPT | Mod: PBBFAC,,, | Performed by: FAMILY MEDICINE

## 2019-09-19 PROCEDURE — 90686 IIV4 VACC NO PRSV 0.5 ML IM: CPT | Mod: S$GLB,,, | Performed by: FAMILY MEDICINE

## 2019-09-19 PROCEDURE — 3078F DIAST BP <80 MM HG: CPT | Mod: CPTII,S$GLB,, | Performed by: FAMILY MEDICINE

## 2019-09-19 PROCEDURE — 3074F SYST BP LT 130 MM HG: CPT | Mod: CPTII,S$GLB,, | Performed by: FAMILY MEDICINE

## 2019-09-19 RX ORDER — IRON,CARBONYL/ASCORBIC ACID 100-250 MG
1 TABLET ORAL DAILY
Qty: 30 TABLET | Refills: 5 | Status: SHIPPED | OUTPATIENT
Start: 2019-09-19 | End: 2020-06-25

## 2019-09-19 RX ORDER — ACETAMINOPHEN AND PHENYLEPHRINE HCL 325; 5 MG/1; MG/1
TABLET ORAL
COMMUNITY
Start: 2019-09-05 | End: 2020-10-05

## 2019-09-19 NOTE — PROGRESS NOTES
Jenna MARIE Ovalle    Chief Complaint   Patient presents with    Hypertension    Follow-up       History of Present Illness:   Ms. Ovalle comes in today for 6-month hypertension follow-up.  She has taken medication today.  She states she does not perform home blood pressure checks.  She states she monitors her diet but does not exercise due to chronic knee pain and swelling.    She states she applies ice with help for pain and swelling.    She states she follows with Dr. Desmond Jones, bariatric surgeon with Endocrinology[facility]ness Department at [facility], whom she states now follows her for prediabetes and vitamin-D deficiency.  She states she recently had lab work performed on August 23, 2019; results will be noted on chart.  She states Dr. Jones has referred her to see GYN.    She states she started taking Qsymia 2 weeks ago; she states it causes her to feel slightly fatigued with slight change in her appetite.  She states she also follows with [facility].    Otherwise, she denies having fever, chills; shortness of breath, cough, wheezing; chest pain, palpitations, leg swelling; abdominal pain, nausea, vomiting, diarrhea, constipation; unusual urinary symptoms; polydipsia, polyuria, polyphagia; back pain; headache; anxiety, depression, homicidal or suicidal thoughts.    Labs (performed on August 23, 2019 as ordered by Dr. Jones):  WBC 5.43, hemoglobin 10.9, hematocrit 34.6, platelet count 386,000, B12 302, folate 6.86, sodium 136, potassium 3.5, chloride 101, CO2 27, glucose 102, calcium 8.7, BUN 8, creatinine 0.6, total protein 7.5, albumin 3.5, total cholesterol 185, triglycerides 74, HDL 48, .13, total bilirubin 0.3, alkaline phosphatase 72, AST 21, ALT 25, insulin resistance panel interpretation-impaired fasting glucose, evidence of insulin resistance, impaired 1st phase beta cell response.  TSH 2.83, free T3 4.67, free T4 1.23, DHEA-SO4 41.4, prolactin 18.6,  testosterone 24.1, sex hormone BG 78.4, EGFR >60, vitamin-D 22.8.        Current Outpatient Medications   Medication Sig    aspirin (ECOTRIN) 81 MG EC tablet Take 81 mg by mouth once daily.    biotin 10,000 mcg Cap     cholecalciferol, vitamin D3, (VITAMIN D3) 1,000 unit capsule Take 1,000 Units by mouth once daily.    ergocalciferol (ERGOCALCIFEROL) 50,000 unit Cap TAKE 1 CAPSULE BY MOUTH EVERY 7 DAYS    ferrous gluconate (FERGON) 324 MG tablet Take 324 mg by mouth daily with breakfast.    fluticasone (FLONASE) 50 mcg/actuation nasal spray 2 sprays by Each Nare route once daily. (Patient taking differently: 2 sprays by Each Nare route daily as needed. )    levocetirizine (XYZAL) 5 MG tablet Take 1 tablet (5 mg total) by mouth every evening. (Patient taking differently: Take 5 mg by mouth nightly as needed. )    losartan (COZAAR) 50 MG tablet TAKE 1 TABLET(50 MG) BY MOUTH EVERY DAY    metFORMIN (GLUCOPHAGE-XR) 500 MG 24 hr tablet TAKE 1 TABLET BY MOUTH WITH DINNER FOR 1 WEEK, THEN TAKE 2 TABLETS BY MOUTH WITH DINNER (Patient taking differently: TAKE 3 TABLETS BY MOUTH WITH DINNER)    phentermine-topiramate (QSYMIA) 3.75-23 mg CM24 Take by mouth once daily.     tiZANidine (ZANAFLEX) 4 MG tablet Take 1 tablet (4 mg total) by mouth 3 (three) times daily as needed.     Review of Systems   Constitutional: Positive for appetite change and fatigue. Negative for activity change, chills, fever and unexpected weight change.        Weight 156 kg (343 lb 12.9 oz) at March 14, 2019 visit. See history of present illness.   Respiratory: Negative for cough, shortness of breath and wheezing.    Cardiovascular: Negative for chest pain, palpitations and leg swelling.   Gastrointestinal: Negative for abdominal pain, constipation, diarrhea, nausea and vomiting.   Endocrine: Positive for polyphagia. Negative for polydipsia and polyuria.        See history of present illness.   Genitourinary: Positive for menstrual problem.  Negative for difficulty urinating, dysuria and hematuria.   Musculoskeletal: Positive for arthralgias and joint swelling. Negative for back pain.        See history of present illness.   Skin:        See history of present illness.   Neurological: Negative for seizures and headaches.   Psychiatric/Behavioral: Negative for dysphoric mood and suicidal ideas. The patient is not nervous/anxious.         Negative for homicidal ideas.        Objective:  Physical Exam   Constitutional: She is oriented to person, place, and time. She appears well-developed and well-nourished. No distress.   Obese and pleasant.   Neck: Normal range of motion. Neck supple. No thyromegaly present.   Cardiovascular: Normal rate, regular rhythm, normal heart sounds and intact distal pulses.   No murmur heard.  Pulmonary/Chest: Effort normal and breath sounds normal. No respiratory distress. She has no wheezes.   Abdominal: Soft. Bowel sounds are normal. She exhibits no distension and no mass. There is no tenderness. There is no rebound and no guarding.   Musculoskeletal: Normal range of motion. She exhibits no edema or tenderness.   She is ambulatory without problems.   Lymphadenopathy:     She has no cervical adenopathy.   Neurological: She is alert and oriented to person, place, and time.   Skin: She is not diaphoretic.   Psychiatric: She has a normal mood and affect. Her behavior is normal. Judgment and thought content normal.   Vitals reviewed.      ASSESSMENT:  1. Essential hypertension    2. Iron deficiency anemia, unspecified iron deficiency anemia type    3. Metabolic syndrome X    4. Prediabetes    5. Vitamin D deficiency    6. Arthritis of knee    7. Perimenopausal    8. Morbid obesity with BMI of 50.0-59.9, adult    9. Need for influenza vaccination        PLAN:  Jenna was seen today for hypertension and follow-up.    Diagnoses and all orders for this visit:    Essential hypertension    Iron deficiency anemia, unspecified iron  deficiency anemia type  -     iron-vitamin C 100-250 mg, ICAR-C, (FE C) 100-250 mg Tab; Take 1 tablet by mouth once daily.    Metabolic syndrome X    Prediabetes    Vitamin D deficiency    Arthritis of knee    Perimenopausal    Morbid obesity with BMI of 50.0-59.9, adult    Need for influenza vaccination  -     Influenza - Quadrivalent (6 months+) (PF)    Patient advised to call for results.  Continue current medications, follow low sodium, low cholesterol, low carb diet, daily walks.  Change from OTC Iron to Rx Iron-Vit C (ICAR-C) 100-250 mg per patient request.  Mobility impairment renewal form completed and given to patient per her request.  Keep follow up with specialists.  Follow up in about 6 months (around 3/18/2020) for physical.

## 2019-11-07 ENCOUNTER — OFFICE VISIT (OUTPATIENT)
Dept: OPHTHALMOLOGY | Facility: CLINIC | Age: 49
End: 2019-11-07
Payer: COMMERCIAL

## 2019-11-07 DIAGNOSIS — H57.13 PAIN AROUND EYE, BILATERAL: Primary | ICD-10-CM

## 2019-11-07 PROCEDURE — 92012 PR EYE EXAM, EST PATIENT,INTERMED: ICD-10-PCS | Mod: S$GLB,,, | Performed by: OPTOMETRIST

## 2019-11-07 PROCEDURE — 99999 PR PBB SHADOW E&M-EST. PATIENT-LVL I: ICD-10-PCS | Mod: PBBFAC,,, | Performed by: OPTOMETRIST

## 2019-11-07 PROCEDURE — 92012 INTRM OPH EXAM EST PATIENT: CPT | Mod: S$GLB,,, | Performed by: OPTOMETRIST

## 2019-11-07 PROCEDURE — 99999 PR PBB SHADOW E&M-EST. PATIENT-LVL I: CPT | Mod: PBBFAC,,, | Performed by: OPTOMETRIST

## 2019-11-07 NOTE — PROGRESS NOTES
HPI     Eye Pain      Additional comments: OD              Comments     NP to DNL  Last seen by MGM on 3/26/19 for yearly eye exam  Patient here today c/o eye pain OD associated with headaches  Patient states she has been on a weight loss medication(Qsymia) for 10   weeks and a side effect is eye problems  Pain Scale:  4 yesterday it was 7/8  Onset:   Since Tuesday   OD, OS, OU:   OD  Discharge:   No  A.M. Matting:  No  Itch:   No  Redness:   No  Photophobia:   No  Foreign body sensation:   No  Deep pain:   Yes  Previous occurrence:   No  Drops:   OTC for Dryness 1x this week  H/O Strabismus 2012  DM          Last edited by Bernadette Rey, PCT on 11/7/2019  3:17 PM. (History)              Assessment /Plan     For exam results, see Encounter Report.    Pain around eye, bilateral      Possible SE of med  Pt has d/c'd med and symptoms have resolved  0 pain today at time of visit  No iritis  IOP and va stable  Reassured pt  Reviewed s/s of iritis and angle closure, RTC ASAP if symptoms begin    RTC PRN

## 2019-11-12 RX ORDER — LEVOCETIRIZINE DIHYDROCHLORIDE 5 MG/1
5 TABLET, FILM COATED ORAL NIGHTLY PRN
Qty: 30 TABLET | Refills: 5 | Status: SHIPPED | OUTPATIENT
Start: 2019-11-12 | End: 2020-01-08

## 2019-11-19 NOTE — PROGRESS NOTES
Subjective:       Patient ID: Jenna Ovalle is a 49 y.o. female.    Chief Complaint   Patient presents with    Cough    Sore Throat    Sinusitis       HPI    Jenna Ovalle is here today with c/o feeling ill x 3 days.  Xyzal not helping.  Reports sore throat, sneezing, RN, NC and stuffy ears.      Review of Systems   Constitutional: Negative.    HENT: Positive for congestion, ear pain, postnasal drip, rhinorrhea and sore throat. Negative for sinus pain.    Respiratory: Negative.    Cardiovascular: Negative.    Neurological: Negative.          Review of patient's allergies indicates:   Allergen Reactions    No known drug allergies          Medication List with Changes/Refills   Current Medications    ASPIRIN (ECOTRIN) 81 MG EC TABLET    Take 81 mg by mouth once daily.    BIOTIN 10,000 MCG CAP        CHOLECALCIFEROL, VITAMIN D3, (VITAMIN D3) 1,000 UNIT CAPSULE    Take 1,000 Units by mouth once daily.    ERGOCALCIFEROL (ERGOCALCIFEROL) 50,000 UNIT CAP    TAKE 1 CAPSULE BY MOUTH EVERY 7 DAYS    FERROUS GLUCONATE (FERGON) 324 MG TABLET    Take 324 mg by mouth daily with breakfast.    FLUTICASONE (FLONASE) 50 MCG/ACTUATION NASAL SPRAY    2 sprays by Each Nare route once daily.    IRON-VITAMIN C 100-250 MG, ICAR-C, (FE C) 100-250 MG TAB    Take 1 tablet by mouth once daily.    LEVOCETIRIZINE (XYZAL) 5 MG TABLET    Take 1 tablet (5 mg total) by mouth nightly as needed.    LOSARTAN (COZAAR) 50 MG TABLET    TAKE 1 TABLET(50 MG) BY MOUTH EVERY DAY    METFORMIN (GLUCOPHAGE-XR) 500 MG 24 HR TABLET    TAKE 1 TABLET BY MOUTH WITH DINNER FOR 1 WEEK, THEN TAKE 2 TABLETS BY MOUTH WITH DINNER    PHENTERMINE-TOPIRAMATE (QSYMIA) 3.75-23 MG CM24    Take by mouth once daily.     TIZANIDINE (ZANAFLEX) 4 MG TABLET    Take 1 tablet (4 mg total) by mouth 3 (three) times daily as needed.       Patient Active Problem List   Diagnosis    Hypertension    Menorrhagia    AR (allergic rhinitis)    History of pulmonary embolism  "   Iron deficiency anemia    Morbid obesity with BMI of 50.0-59.9, adult    Arthritis of back    Prediabetes    Arthritis of knee    Vitamin D deficiency    Perimenopausal    Metabolic syndrome X    Exotropia of left eye         Past medical, surgical, family and social histories have been reviewed today.        Objective:     Vitals:    11/21/19 1611   BP: 130/81   Pulse: 86   Temp: 98.8 °F (37.1 °C)   Weight: (!) 146.5 kg (322 lb 15.6 oz)   Height: 5' 5" (1.651 m)   PainSc:   7   PainLoc: Throat         Physical Exam   Constitutional: She is oriented to person, place, and time. She appears well-developed and well-nourished.   HENT:   Head: Normocephalic and atraumatic.   Right Ear: External ear normal.   Left Ear: External ear normal.   Nose: Mucosal edema and rhinorrhea present.   Mouth/Throat: Oropharynx is clear and moist and mucous membranes are normal. No oropharyngeal exudate.   Eyes: Pupils are equal, round, and reactive to light. Conjunctivae are normal. Right eye exhibits no discharge. Left eye exhibits no discharge.   Cardiovascular: Normal rate and regular rhythm.   Pulmonary/Chest: Effort normal and breath sounds normal.   Musculoskeletal: Normal range of motion. She exhibits no edema.   Lymphadenopathy:     She has no cervical adenopathy.   Neurological: She is alert and oriented to person, place, and time.   Skin: Skin is warm and dry. Capillary refill takes less than 2 seconds. No rash noted.   Psychiatric: She has a normal mood and affect. Her behavior is normal. Judgment and thought content normal.   Vitals reviewed.        Diagnosis       1. Viral URI    2. Cough          Assessment/ Plan     Viral URI  -     triamcinolone (NASACORT) 55 mcg nasal inhaler; 2 sprays by Nasal route once daily.  Dispense: 17 g; Refill: 3  -     betamethasone acetate-betamethasone sodium phosphate injection 12 mg    Cough  -     benzonatate (TESSALON) 100 MG capsule; Take 1-2 capsules (100-200 mg total) by " mouth 3 (three) times daily as needed for Cough.  Dispense: 60 capsule; Refill: 0  -     betamethasone acetate-betamethasone sodium phosphate injection 12 mg        Injection today.  Symptomatic care, rest and fluids.  Follow-up in clinic as needed.        Patient Care Team:  Franca Richmond MD as PCP - General (Family Medicine)  Lupe Ellington MD as Consulting Physician (Gynecology)  Zan Lake LPN as Care Coordinator (Internal Medicine)      YAHIR Campbell  [facility]

## 2019-11-21 ENCOUNTER — OFFICE VISIT (OUTPATIENT)
Dept: FAMILY MEDICINE | Facility: CLINIC | Age: 49
End: 2019-11-21
Payer: COMMERCIAL

## 2019-11-21 VITALS
HEIGHT: 65 IN | HEART RATE: 86 BPM | WEIGHT: 293 LBS | SYSTOLIC BLOOD PRESSURE: 130 MMHG | TEMPERATURE: 99 F | DIASTOLIC BLOOD PRESSURE: 81 MMHG | BODY MASS INDEX: 48.82 KG/M2

## 2019-11-21 DIAGNOSIS — R05.9 COUGH: ICD-10-CM

## 2019-11-21 DIAGNOSIS — J06.9 VIRAL URI: Primary | ICD-10-CM

## 2019-11-21 PROCEDURE — 3079F PR MOST RECENT DIASTOLIC BLOOD PRESSURE 80-89 MM HG: ICD-10-PCS | Mod: CPTII,S$GLB,, | Performed by: REGISTERED NURSE

## 2019-11-21 PROCEDURE — 99213 OFFICE O/P EST LOW 20 MIN: CPT | Mod: 25,S$GLB,, | Performed by: REGISTERED NURSE

## 2019-11-21 PROCEDURE — 3008F PR BODY MASS INDEX (BMI) DOCUMENTED: ICD-10-PCS | Mod: CPTII,S$GLB,, | Performed by: REGISTERED NURSE

## 2019-11-21 PROCEDURE — 96372 THER/PROPH/DIAG INJ SC/IM: CPT | Mod: S$GLB,,, | Performed by: REGISTERED NURSE

## 2019-11-21 PROCEDURE — 3075F PR MOST RECENT SYSTOLIC BLOOD PRESS GE 130-139MM HG: ICD-10-PCS | Mod: CPTII,S$GLB,, | Performed by: REGISTERED NURSE

## 2019-11-21 PROCEDURE — 3079F DIAST BP 80-89 MM HG: CPT | Mod: CPTII,S$GLB,, | Performed by: REGISTERED NURSE

## 2019-11-21 PROCEDURE — 99999 PR PBB SHADOW E&M-EST. PATIENT-LVL IV: ICD-10-PCS | Mod: PBBFAC,,, | Performed by: REGISTERED NURSE

## 2019-11-21 PROCEDURE — 3075F SYST BP GE 130 - 139MM HG: CPT | Mod: CPTII,S$GLB,, | Performed by: REGISTERED NURSE

## 2019-11-21 PROCEDURE — 96372 PR INJECTION,THERAP/PROPH/DIAG2ST, IM OR SUBCUT: ICD-10-PCS | Mod: S$GLB,,, | Performed by: REGISTERED NURSE

## 2019-11-21 PROCEDURE — 3008F BODY MASS INDEX DOCD: CPT | Mod: CPTII,S$GLB,, | Performed by: REGISTERED NURSE

## 2019-11-21 PROCEDURE — 99213 PR OFFICE/OUTPT VISIT, EST, LEVL III, 20-29 MIN: ICD-10-PCS | Mod: 25,S$GLB,, | Performed by: REGISTERED NURSE

## 2019-11-21 PROCEDURE — 99999 PR PBB SHADOW E&M-EST. PATIENT-LVL IV: CPT | Mod: PBBFAC,,, | Performed by: REGISTERED NURSE

## 2019-11-21 RX ORDER — BETAMETHASONE SODIUM PHOSPHATE AND BETAMETHASONE ACETATE 3; 3 MG/ML; MG/ML
12 INJECTION, SUSPENSION INTRA-ARTICULAR; INTRALESIONAL; INTRAMUSCULAR; SOFT TISSUE
Status: COMPLETED | OUTPATIENT
Start: 2019-11-21 | End: 2019-11-21

## 2019-11-21 RX ORDER — BENZONATATE 100 MG/1
100-200 CAPSULE ORAL 3 TIMES DAILY PRN
Qty: 60 CAPSULE | Refills: 0 | Status: SHIPPED | OUTPATIENT
Start: 2019-11-21 | End: 2020-01-08

## 2019-11-21 RX ORDER — TRIAMCINOLONE ACETONIDE 55 UG/1
2 SPRAY, METERED NASAL DAILY
Qty: 17 G | Refills: 3 | Status: SHIPPED | OUTPATIENT
Start: 2019-11-21 | End: 2020-01-08

## 2019-11-21 RX ADMIN — BETAMETHASONE SODIUM PHOSPHATE AND BETAMETHASONE ACETATE 12 MG: 3; 3 INJECTION, SUSPENSION INTRA-ARTICULAR; INTRALESIONAL; INTRAMUSCULAR; SOFT TISSUE at 04:11

## 2019-11-24 RX ORDER — ERGOCALCIFEROL 1.25 MG/1
CAPSULE ORAL
Qty: 4 CAPSULE | Refills: 5 | Status: SHIPPED | OUTPATIENT
Start: 2019-11-24 | End: 2020-01-13 | Stop reason: SDUPTHER

## 2020-01-07 ENCOUNTER — PATIENT MESSAGE (OUTPATIENT)
Dept: FAMILY MEDICINE | Facility: CLINIC | Age: 50
End: 2020-01-07

## 2020-01-08 ENCOUNTER — OFFICE VISIT (OUTPATIENT)
Dept: FAMILY MEDICINE | Facility: CLINIC | Age: 50
End: 2020-01-08
Payer: COMMERCIAL

## 2020-01-08 VITALS
SYSTOLIC BLOOD PRESSURE: 112 MMHG | OXYGEN SATURATION: 99 % | HEIGHT: 65 IN | TEMPERATURE: 98 F | DIASTOLIC BLOOD PRESSURE: 68 MMHG | HEART RATE: 68 BPM | BODY MASS INDEX: 48.82 KG/M2 | WEIGHT: 293 LBS

## 2020-01-08 DIAGNOSIS — R53.83 FATIGUE, UNSPECIFIED TYPE: Primary | ICD-10-CM

## 2020-01-08 DIAGNOSIS — Z86.2 HISTORY OF IRON DEFICIENCY ANEMIA: ICD-10-CM

## 2020-01-08 DIAGNOSIS — R63.4 WEIGHT LOSS: ICD-10-CM

## 2020-01-08 PROCEDURE — 3078F PR MOST RECENT DIASTOLIC BLOOD PRESSURE < 80 MM HG: ICD-10-PCS | Mod: CPTII,S$GLB,, | Performed by: REGISTERED NURSE

## 2020-01-08 PROCEDURE — 3008F PR BODY MASS INDEX (BMI) DOCUMENTED: ICD-10-PCS | Mod: CPTII,S$GLB,, | Performed by: REGISTERED NURSE

## 2020-01-08 PROCEDURE — 99213 OFFICE O/P EST LOW 20 MIN: CPT | Mod: S$GLB,,, | Performed by: REGISTERED NURSE

## 2020-01-08 PROCEDURE — 3008F BODY MASS INDEX DOCD: CPT | Mod: CPTII,S$GLB,, | Performed by: REGISTERED NURSE

## 2020-01-08 PROCEDURE — 3078F DIAST BP <80 MM HG: CPT | Mod: CPTII,S$GLB,, | Performed by: REGISTERED NURSE

## 2020-01-08 PROCEDURE — 99213 PR OFFICE/OUTPT VISIT, EST, LEVL III, 20-29 MIN: ICD-10-PCS | Mod: S$GLB,,, | Performed by: REGISTERED NURSE

## 2020-01-08 PROCEDURE — 99999 PR PBB SHADOW E&M-EST. PATIENT-LVL III: CPT | Mod: PBBFAC,,, | Performed by: REGISTERED NURSE

## 2020-01-08 PROCEDURE — 3074F SYST BP LT 130 MM HG: CPT | Mod: CPTII,S$GLB,, | Performed by: REGISTERED NURSE

## 2020-01-08 PROCEDURE — 99999 PR PBB SHADOW E&M-EST. PATIENT-LVL III: ICD-10-PCS | Mod: PBBFAC,,, | Performed by: REGISTERED NURSE

## 2020-01-08 PROCEDURE — 3074F PR MOST RECENT SYSTOLIC BLOOD PRESSURE < 130 MM HG: ICD-10-PCS | Mod: CPTII,S$GLB,, | Performed by: REGISTERED NURSE

## 2020-01-10 ENCOUNTER — PATIENT MESSAGE (OUTPATIENT)
Dept: FAMILY MEDICINE | Facility: CLINIC | Age: 50
End: 2020-01-10

## 2020-01-13 ENCOUNTER — PATIENT MESSAGE (OUTPATIENT)
Dept: FAMILY MEDICINE | Facility: CLINIC | Age: 50
End: 2020-01-13

## 2020-01-13 ENCOUNTER — TELEPHONE (OUTPATIENT)
Dept: FAMILY MEDICINE | Facility: CLINIC | Age: 50
End: 2020-01-13

## 2020-01-13 DIAGNOSIS — E55.9 VITAMIN D DEFICIENCY: Primary | ICD-10-CM

## 2020-01-13 RX ORDER — ERGOCALCIFEROL 1.25 MG/1
50000 CAPSULE ORAL
Qty: 8 CAPSULE | Refills: 2 | Status: SHIPPED | OUTPATIENT
Start: 2020-01-13 | End: 2020-08-29

## 2020-01-13 NOTE — TELEPHONE ENCOUNTER
Please advise pt all iron has potential to be constipating. over-the-counter Hemocyte (ferrous fumerate) preparation may be little less constipating but all depends on the patient.  ICAR-C, which she takes, has in it vitamin C, which is supposed to improve iron absorption.  Has she tried taking stool softener with the iron?

## 2020-01-13 NOTE — TELEPHONE ENCOUNTER
Patient informed of  response.  Patient  States she will try a stool softner as needed when constipated.

## 2020-01-13 NOTE — TELEPHONE ENCOUNTER
----- Message from Mary Pino sent at 1/13/2020 11:05 AM CST -----  Contact: Self [phone]  Patient would like to speak with you about is there a new iron medication that has less constipation affects. Please advise

## 2020-01-17 ENCOUNTER — PATIENT OUTREACH (OUTPATIENT)
Dept: ADMINISTRATIVE | Facility: HOSPITAL | Age: 50
End: 2020-01-17

## 2020-01-17 NOTE — PROGRESS NOTES
Outside Data entered Per Care Coordinator           Vira GEIGER LPN Care Coordinator  Care Coordination Department  [facility]  [phone]

## 2020-01-20 NOTE — PROGRESS NOTES
Subjective:       Patient ID: Jenna Ovalle is a 49 y.o. female.    Chief Complaint   Patient presents with    Palpitations    Weight Loss       HPI    Jenna Ovalle is here today with c/o HA and eye pain.  Saw Dr. Jones yesterday over at [facility], he thought the s/s were due to her Qsymia she is taking for weight loss.  She follows w/ him every 3 months.  Eye check with Opth was fine.  Dr. Jones stopped the Qsymia and put on her low dose Phentermine as he thought the Topamax was causing her issue.  Does c/o fatigue, SOB, bruising easily, palpitations ---- h/o iron def anemia.    LAB from [facility] dated 1/7/2020:  CBC --- normal  B12 --- nl  Folate -- nl  CMP --- nl  A1c --- 6.3 %      Review of Systems      Review of patient's allergies indicates:   Allergen Reactions    No known drug allergies          Medication List with Changes/Refills   Current Medications    ASPIRIN (ECOTRIN) 81 MG EC TABLET    Take 81 mg by mouth once daily.    BIOTIN 10,000 MCG CAP        CHOLECALCIFEROL, VITAMIN D3, (VITAMIN D3) 1,000 UNIT CAPSULE    Take 1,000 Units by mouth once daily.    IRON-VITAMIN C 100-250 MG, ICAR-C, (FE C) 100-250 MG TAB    Take 1 tablet by mouth once daily.    LOSARTAN (COZAAR) 50 MG TABLET    TAKE 1 TABLET(50 MG) BY MOUTH EVERY DAY    METFORMIN (GLUCOPHAGE-XR) 500 MG 24 HR TABLET    TAKE 1 TABLET BY MOUTH WITH DINNER FOR 1 WEEK, THEN TAKE 2 TABLETS BY MOUTH WITH DINNER    PHENTERMINE-TOPIRAMATE (QSYMIA) 3.75-23 MG CM24    Take by mouth once daily.    Changed and/or Refilled Medications    Modified Medication Previous Medication    ERGOCALCIFEROL (ERGOCALCIFEROL) 50,000 UNIT CAP ergocalciferol (ERGOCALCIFEROL) 50,000 unit Cap       Take 1 capsule (50,000 Units total) by mouth twice a week.    TAKE 1 CAPSULE BY MOUTH EVERY 7 DAYS   Discontinued Medications    BENZONATATE (TESSALON) 100 MG CAPSULE    Take 1-2 capsules (100-200 mg total) by mouth 3 (three) times daily as needed  "for Cough.    FLUTICASONE (FLONASE) 50 MCG/ACTUATION NASAL SPRAY    2 sprays by Each Nare route once daily.    LEVOCETIRIZINE (XYZAL) 5 MG TABLET    Take 1 tablet (5 mg total) by mouth nightly as needed.    TIZANIDINE (ZANAFLEX) 4 MG TABLET    Take 1 tablet (4 mg total) by mouth 3 (three) times daily as needed.    TRIAMCINOLONE (NASACORT) 55 MCG NASAL INHALER    2 sprays by Nasal route once daily.       Patient Active Problem List   Diagnosis    Hypertension    Menorrhagia    AR (allergic rhinitis)    History of pulmonary embolism    Iron deficiency anemia    Morbid obesity with BMI of 50.0-59.9, adult    Arthritis of back    Prediabetes    Arthritis of knee    Vitamin D deficiency    Perimenopausal    Metabolic syndrome X    Exotropia of left eye         Past medical, surgical, family and social histories have been reviewed today.        Objective:     Vitals:    01/08/20 1107   BP: 112/68   Pulse: 68   Temp: 98.1 °F (36.7 °C)   SpO2: 99%   Weight: (!) 142.1 kg (313 lb 4.4 oz)   Height: 5' 5" (1.651 m)   PainSc: 0-No pain         Physical Exam      Diagnosis       1. Fatigue, unspecified type    2. History of iron deficiency anemia    3. Weight loss          Assessment/ Plan     Fatigue, unspecified type  -     CBC Without Differential; Future; Expected date: 01/08/2020  -     Iron and TIBC; Future; Expected date: 01/08/2020  -     Ferritin; Future; Expected date: 01/08/2020  -     Vitamin D; Future; Expected date: 01/08/2020    History of iron deficiency anemia  -     CBC Without Differential; Future; Expected date: 01/08/2020  -     Iron and TIBC; Future; Expected date: 01/08/2020  -     Ferritin; Future; Expected date: 01/08/2020    Weight loss  · On diet medication, followed by Dr. Terence Jones.  · Healthy diet, regular exercise.        Lab pending.  Follow-up in clinic as needed.        Patient Care Team:  Franca Richmond MD as PCP - General (Family Medicine)  Lupe Ellington MD as " Consulting Physician (Gynecology)  Zan Lake LPN as Care Coordinator (Internal Medicine)      YAHIR Campbell  [facility]

## 2020-02-03 ENCOUNTER — PATIENT MESSAGE (OUTPATIENT)
Dept: FAMILY MEDICINE | Facility: CLINIC | Age: 50
End: 2020-02-03

## 2020-02-03 DIAGNOSIS — D50.9 IRON DEFICIENCY ANEMIA, UNSPECIFIED IRON DEFICIENCY ANEMIA TYPE: Primary | ICD-10-CM

## 2020-02-04 NOTE — TELEPHONE ENCOUNTER
Please advise pt she may need to sign release of information to go to Dr. Mtiali Mathew; then, okay to send information as requested. Referral in. THanks.

## 2020-02-06 NOTE — TELEPHONE ENCOUNTER
----- Message from Renetta Hwang sent at 2/6/2020  1:20 PM CST -----  Contact: pt  Pt would like to be called back regarding  Referral to hematoloy  Pt can be reached at [phone]

## 2020-02-06 NOTE — TELEPHONE ENCOUNTER
Patient informed referral was faxed to Dr. Mitali Mathew at [phone].  Patient voiced understanding.

## 2020-02-21 RX ORDER — LOSARTAN POTASSIUM 50 MG/1
TABLET ORAL
Qty: 90 TABLET | Refills: 1 | Status: SHIPPED | OUTPATIENT
Start: 2020-02-21 | End: 2020-08-20

## 2020-03-20 ENCOUNTER — TELEPHONE (OUTPATIENT)
Dept: FAMILY MEDICINE | Facility: CLINIC | Age: 50
End: 2020-03-20

## 2020-03-20 ENCOUNTER — PATIENT MESSAGE (OUTPATIENT)
Dept: FAMILY MEDICINE | Facility: CLINIC | Age: 50
End: 2020-03-20

## 2020-03-20 NOTE — TELEPHONE ENCOUNTER
----- Message from Lisa Rhodes sent at 3/20/2020  9:07 AM CDT -----  Contact: Self- [phone]  Pt would like a call from nurse in regards to sinus symptoms. Please call back at [phone].       Thank You,   Lisa Rhodes

## 2020-03-24 DIAGNOSIS — J30.2 SEASONAL ALLERGIC RHINITIS, UNSPECIFIED TRIGGER: Primary | ICD-10-CM

## 2020-03-24 RX ORDER — METHYLPREDNISOLONE 4 MG/1
TABLET ORAL
Qty: 1 PACKAGE | Refills: 0 | Status: SHIPPED | OUTPATIENT
Start: 2020-03-24 | End: 2020-06-25

## 2020-05-21 ENCOUNTER — PATIENT MESSAGE (OUTPATIENT)
Dept: FAMILY MEDICINE | Facility: CLINIC | Age: 50
End: 2020-05-21

## 2020-05-21 NOTE — TELEPHONE ENCOUNTER
Please send patient letter stating     Ms. Ovalle has significant risk factors in relation to severe and complicated  COVID-19 and should be isolated from the workplace to avoid a health risk exposure at this time.

## 2020-06-25 ENCOUNTER — OFFICE VISIT (OUTPATIENT)
Dept: FAMILY MEDICINE | Facility: CLINIC | Age: 50
End: 2020-06-25
Payer: COMMERCIAL

## 2020-06-25 ENCOUNTER — LAB VISIT (OUTPATIENT)
Dept: LAB | Facility: HOSPITAL | Age: 50
End: 2020-06-25
Attending: FAMILY MEDICINE
Payer: COMMERCIAL

## 2020-06-25 VITALS
TEMPERATURE: 98 F | HEART RATE: 81 BPM | SYSTOLIC BLOOD PRESSURE: 122 MMHG | WEIGHT: 293 LBS | HEIGHT: 65 IN | DIASTOLIC BLOOD PRESSURE: 74 MMHG | OXYGEN SATURATION: 99 % | BODY MASS INDEX: 48.82 KG/M2

## 2020-06-25 DIAGNOSIS — R73.03 PREDIABETES: ICD-10-CM

## 2020-06-25 DIAGNOSIS — I10 ESSENTIAL HYPERTENSION: ICD-10-CM

## 2020-06-25 DIAGNOSIS — E55.9 VITAMIN D DEFICIENCY: ICD-10-CM

## 2020-06-25 DIAGNOSIS — Z00.00 PREVENTATIVE HEALTH CARE: ICD-10-CM

## 2020-06-25 DIAGNOSIS — D50.9 IRON DEFICIENCY ANEMIA, UNSPECIFIED IRON DEFICIENCY ANEMIA TYPE: ICD-10-CM

## 2020-06-25 DIAGNOSIS — Z00.00 PREVENTATIVE HEALTH CARE: Primary | ICD-10-CM

## 2020-06-25 PROBLEM — E88.810 METABOLIC SYNDROME X: Status: RESOLVED | Noted: 2019-03-26 | Resolved: 2020-06-25

## 2020-06-25 LAB
ALBUMIN SERPL BCP-MCNC: 3.2 G/DL (ref 3.5–5.2)
ALP SERPL-CCNC: 82 U/L (ref 55–135)
ALT SERPL W/O P-5'-P-CCNC: 18 U/L (ref 10–44)
ANION GAP SERPL CALC-SCNC: 8 MMOL/L (ref 8–16)
AST SERPL-CCNC: 18 U/L (ref 10–40)
BASOPHILS # BLD AUTO: 0.02 K/UL (ref 0–0.2)
BASOPHILS NFR BLD: 0.4 % (ref 0–1.9)
BILIRUB SERPL-MCNC: 0.3 MG/DL (ref 0.1–1)
BUN SERPL-MCNC: 7 MG/DL (ref 6–20)
CALCIUM SERPL-MCNC: 8.9 MG/DL (ref 8.7–10.5)
CHLORIDE SERPL-SCNC: 105 MMOL/L (ref 95–110)
CHOLEST SERPL-MCNC: 210 MG/DL (ref 120–199)
CHOLEST/HDLC SERPL: 3.2 {RATIO} (ref 2–5)
CO2 SERPL-SCNC: 26 MMOL/L (ref 23–29)
CREAT SERPL-MCNC: 0.7 MG/DL (ref 0.5–1.4)
DIFFERENTIAL METHOD: ABNORMAL
EOSINOPHIL # BLD AUTO: 0 K/UL (ref 0–0.5)
EOSINOPHIL NFR BLD: 0.9 % (ref 0–8)
ERYTHROCYTE [DISTWIDTH] IN BLOOD BY AUTOMATED COUNT: 16.1 % (ref 11.5–14.5)
EST. GFR  (AFRICAN AMERICAN): >60 ML/MIN/1.73 M^2
EST. GFR  (NON AFRICAN AMERICAN): >60 ML/MIN/1.73 M^2
GLUCOSE SERPL-MCNC: 77 MG/DL (ref 70–110)
HCT VFR BLD AUTO: 42.5 % (ref 37–48.5)
HDLC SERPL-MCNC: 65 MG/DL (ref 40–75)
HDLC SERPL: 31 % (ref 20–50)
HGB BLD-MCNC: 13 G/DL (ref 12–16)
IMM GRANULOCYTES # BLD AUTO: 0.01 K/UL (ref 0–0.04)
IMM GRANULOCYTES NFR BLD AUTO: 0.2 % (ref 0–0.5)
LDLC SERPL CALC-MCNC: 130.8 MG/DL (ref 63–159)
LYMPHOCYTES # BLD AUTO: 1.7 K/UL (ref 1–4.8)
LYMPHOCYTES NFR BLD: 36.4 % (ref 18–48)
MCH RBC QN AUTO: 28.4 PG (ref 27–31)
MCHC RBC AUTO-ENTMCNC: 30.6 G/DL (ref 32–36)
MCV RBC AUTO: 93 FL (ref 82–98)
MONOCYTES # BLD AUTO: 0.4 K/UL (ref 0.3–1)
MONOCYTES NFR BLD: 9.4 % (ref 4–15)
NEUTROPHILS # BLD AUTO: 2.4 K/UL (ref 1.8–7.7)
NEUTROPHILS NFR BLD: 52.7 % (ref 38–73)
NONHDLC SERPL-MCNC: 145 MG/DL
NRBC BLD-RTO: 0 /100 WBC
PLATELET # BLD AUTO: 362 K/UL (ref 150–350)
PMV BLD AUTO: 10.7 FL (ref 9.2–12.9)
POTASSIUM SERPL-SCNC: 4.2 MMOL/L (ref 3.5–5.1)
PROT SERPL-MCNC: 7.8 G/DL (ref 6–8.4)
RBC # BLD AUTO: 4.57 M/UL (ref 4–5.4)
SODIUM SERPL-SCNC: 139 MMOL/L (ref 136–145)
TRIGL SERPL-MCNC: 71 MG/DL (ref 30–150)
TSH SERPL DL<=0.005 MIU/L-ACNC: 1.22 UIU/ML (ref 0.4–4)
WBC # BLD AUTO: 4.59 K/UL (ref 3.9–12.7)

## 2020-06-25 PROCEDURE — 3074F PR MOST RECENT SYSTOLIC BLOOD PRESSURE < 130 MM HG: ICD-10-PCS | Mod: CPTII,S$GLB,, | Performed by: REGISTERED NURSE

## 2020-06-25 PROCEDURE — 3074F SYST BP LT 130 MM HG: CPT | Mod: CPTII,S$GLB,, | Performed by: REGISTERED NURSE

## 2020-06-25 PROCEDURE — 82306 VITAMIN D 25 HYDROXY: CPT

## 2020-06-25 PROCEDURE — 85025 COMPLETE CBC W/AUTO DIFF WBC: CPT

## 2020-06-25 PROCEDURE — 99999 PR PBB SHADOW E&M-EST. PATIENT-LVL III: CPT | Mod: PBBFAC,,, | Performed by: REGISTERED NURSE

## 2020-06-25 PROCEDURE — 99396 PR PREVENTIVE VISIT,EST,40-64: ICD-10-PCS | Mod: S$GLB,,, | Performed by: REGISTERED NURSE

## 2020-06-25 PROCEDURE — 80053 COMPREHEN METABOLIC PANEL: CPT

## 2020-06-25 PROCEDURE — 83036 HEMOGLOBIN GLYCOSYLATED A1C: CPT

## 2020-06-25 PROCEDURE — 99999 PR PBB SHADOW E&M-EST. PATIENT-LVL III: ICD-10-PCS | Mod: PBBFAC,,, | Performed by: REGISTERED NURSE

## 2020-06-25 PROCEDURE — 99396 PREV VISIT EST AGE 40-64: CPT | Mod: S$GLB,,, | Performed by: REGISTERED NURSE

## 2020-06-25 PROCEDURE — 3078F PR MOST RECENT DIASTOLIC BLOOD PRESSURE < 80 MM HG: ICD-10-PCS | Mod: CPTII,S$GLB,, | Performed by: REGISTERED NURSE

## 2020-06-25 PROCEDURE — 80061 LIPID PANEL: CPT

## 2020-06-25 PROCEDURE — 3078F DIAST BP <80 MM HG: CPT | Mod: CPTII,S$GLB,, | Performed by: REGISTERED NURSE

## 2020-06-25 PROCEDURE — 36415 COLL VENOUS BLD VENIPUNCTURE: CPT | Mod: PO

## 2020-06-25 PROCEDURE — 84443 ASSAY THYROID STIM HORMONE: CPT

## 2020-06-25 RX ORDER — METFORMIN HYDROCHLORIDE 500 MG/1
500 TABLET, EXTENDED RELEASE ORAL
COMMUNITY
Start: 2018-09-11 | End: 2020-06-25

## 2020-06-25 RX ORDER — LEVOCETIRIZINE DIHYDROCHLORIDE 5 MG/1
TABLET, FILM COATED ORAL
COMMUNITY
Start: 2020-04-20 | End: 2021-06-18

## 2020-06-25 RX ORDER — CARVEDILOL 3.12 MG/1
6.25 TABLET ORAL
COMMUNITY
Start: 2020-04-30 | End: 2021-07-02

## 2020-06-26 LAB
25(OH)D3+25(OH)D2 SERPL-MCNC: 20 NG/ML (ref 30–96)
ESTIMATED AVG GLUCOSE: 111 MG/DL (ref 68–131)
HBA1C MFR BLD HPLC: 5.5 % (ref 4–5.6)

## 2020-07-06 NOTE — PROGRESS NOTES
Subjective:       Patient ID: Jenna Ovalle is a 50 y.o. female.    Chief Complaint   Patient presents with    Annual Exam         HPI    Jenna Ovalle is here today for an annual wellness exam.  I have reviewed the patient's medical history in detail and updated the computerized patient record.          Review of Systems   Constitutional: Positive for fatigue. Negative for activity change, appetite change, chills, diaphoresis, fever and unexpected weight change.   HENT: Negative for nasal congestion, mouth sores and tinnitus.    Eyes: Negative for discharge and visual disturbance.   Respiratory: Negative for cough, shortness of breath and wheezing.    Cardiovascular: Positive for palpitations. Negative for chest pain and leg swelling.        Seen by Dr. Cabrera ([facility]) --- was dx with viral cardiomyopathy.  Coreg was added to her daily med regimen along with the Losartan.  Got a 2nd opinion with Dr. Rust ([facility]) who told her that based on the EF on her Echo, she did not have cardiomyopathy and more testing is being done.  Will f/u w/ her in September.   Gastrointestinal: Negative for abdominal pain, bloating, blood in stool, constipation, diarrhea, nausea, vomiting and reflux.   Endocrine: Negative.    Genitourinary: Negative.    Musculoskeletal: Negative.    Integumentary:  Negative for rash. Negative.   Neurological: Negative for vertigo, seizures, syncope, numbness and headaches.   Hematological: Bruises/bleeds easily.        Followed by Hematology/Onc for IV iron infusions   Psychiatric/Behavioral: Negative for depression and sleep disturbance. The patient is not nervous/anxious.    Breast: negative.           Review of patient's allergies indicates:   Allergen Reactions    No known drug allergies          Patient Active Problem List   Diagnosis    Hypertension    Menorrhagia    AR (allergic rhinitis)    History of pulmonary embolism    Iron deficiency anemia    Morbid obesity with BMI of  50.0-59.9, adult    Arthritis of back    Prediabetes    Arthritis of knee    Vitamin D deficiency    Perimenopausal    Exotropia of left eye         Current Outpatient Medications:     aspirin (ECOTRIN) 81 MG EC tablet, Take 81 mg by mouth once daily., Disp: , Rfl:     carvediloL (COREG) 3.125 MG tablet, TK 1 T PO D, Disp: , Rfl:     ergocalciferol (ERGOCALCIFEROL) 50,000 unit Cap, Take 1 capsule (50,000 Units total) by mouth twice a week., Disp: 8 capsule, Rfl: 2    losartan (COZAAR) 50 MG tablet, TAKE 1 TABLET(50 MG) BY MOUTH EVERY DAY, Disp: 90 tablet, Rfl: 1    metFORMIN (GLUCOPHAGE-XR) 500 MG 24 hr tablet, TAKE 1 TABLET BY MOUTH WITH DINNER FOR 1 WEEK, THEN TAKE 2 TABLETS BY MOUTH WITH DINNER (Patient taking differently: TAKE 3 TABLETS BY MOUTH WITH DINNER), Disp: 60 tablet, Rfl: 3    biotin 10,000 mcg Cap, , Disp: , Rfl:     cholecalciferol, vitamin D3, (VITAMIN D3) 1,000 unit capsule, Take 1,000 Units by mouth once daily., Disp: , Rfl:     levocetirizine (XYZAL) 5 MG tablet, , Disp: , Rfl:       Past Medical History:   Diagnosis Date    AR (allergic rhinitis)     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    Breast cancer     1995 left    Headache(784.0)     Hypertension     Iron deficiency anemia due to chronic blood loss     Low vitamin D level 03/10/2017    Menorrhagia     Morbidly obese     Obesity     PE (pulmonary embolism)     elevated ESR, CRP in the past    Prediabetes     Strabismus     Left eye    Uterine fibroid        Past Surgical History:   Procedure Laterality Date    CHOLECYSTECTOMY      MYOMECTOMY         Family History   Problem Relation Age of Onset    Diabetes Mother     Cancer Mother         Lung cancer (nonsmoker)    Diabetes Father     Hypertension Father     Aneurysm Father         Brain aneurysm    Cancer Maternal Aunt         Breast cancer    Cancer Paternal Aunt         Breast cancer    Stroke Paternal Uncle     Cancer Paternal Grandfather          "Pancreatic cancer    Other Sister         Prediabetes    Diabetes Brother     Heart disease Paternal Grandmother        Social History     Tobacco Use    Smoking status: Never Smoker    Smokeless tobacco: Never Used   Substance Use Topics    Alcohol use: No     Alcohol/week: 0.0 standard drinks    Drug use: No           Objective:     Vitals:    06/25/20 1021   BP: 122/74   Pulse: 81   Temp: 98 °F (36.7 °C)   TempSrc: Oral   SpO2: 99%   Weight: (!) 144.9 kg (319 lb 7.1 oz)   Height: 5' 5" (1.651 m)       Estimated body mass index is 53.16 kg/m² as calculated from the following:    Height as of this encounter: 5' 5" (1.651 m).    Weight as of this encounter: 144.9 kg (319 lb 7.1 oz).      Physical Exam  Constitutional:       General: She is not in acute distress.     Appearance: She is well-developed. She is not diaphoretic.   HENT:      Head: Normocephalic and atraumatic.      Right Ear: External ear normal.      Left Ear: External ear normal.      Nose: Nose normal. No nasal deformity, mucosal edema, rhinorrhea or epistaxis.      Mouth/Throat:      Mouth: Mucous membranes are not dry and not cyanotic. No oral lesions.      Dentition: Normal dentition.      Pharynx: Uvula midline. No oropharyngeal exudate or uvula swelling.      Tonsils: No tonsillar abscesses.   Eyes:      General: Lids are normal. Lids are everted, no foreign bodies appreciated. No scleral icterus.        Right eye: No discharge.         Left eye: No discharge.      Conjunctiva/sclera: Conjunctivae normal.      Right eye: Right conjunctiva is not injected. No exudate.     Left eye: Left conjunctiva is not injected. No exudate.     Pupils: Pupils are equal, round, and reactive to light.   Neck:      Musculoskeletal: Normal range of motion and neck supple. Normal range of motion. No edema or erythema.      Thyroid: No thyroid mass or thyromegaly.      Vascular: No carotid bruit or JVD.      Trachea: No tracheal deviation.   Cardiovascular:    "   Rate and Rhythm: Normal rate and regular rhythm.      Heart sounds: No murmur. No friction rub. No gallop.    Pulmonary:      Effort: Pulmonary effort is normal. No respiratory distress.      Breath sounds: Normal breath sounds. No stridor. No wheezing.   Chest:      Chest wall: No tenderness.   Abdominal:      General: Bowel sounds are normal. There is no distension.      Palpations: Abdomen is soft. There is no mass.      Tenderness: There is no abdominal tenderness. There is no guarding or rebound.   Genitourinary:     Comments: Deferred to Gynecology  Musculoskeletal: Normal range of motion.         General: No tenderness or deformity.   Lymphadenopathy:      Cervical: No cervical adenopathy.   Skin:     General: Skin is warm and dry.      Capillary Refill: Capillary refill takes less than 2 seconds.      Coloration: Skin is not pale.      Findings: No bruising, erythema or rash.   Neurological:      Mental Status: She is alert and oriented to person, place, and time.      Cranial Nerves: No cranial nerve deficit.      Sensory: No sensory deficit.      Motor: No tremor, atrophy or abnormal muscle tone.      Coordination: Coordination normal.      Gait: Gait normal.      Deep Tendon Reflexes: Reflexes are normal and symmetric.   Psychiatric:         Speech: Speech normal.         Behavior: Behavior normal.         Thought Content: Thought content normal.         Cognition and Memory: Memory is not impaired.         Judgment: Judgment normal.           Diagnosis       1. Preventative health care    2. Essential hypertension    3. Prediabetes    4. Iron deficiency anemia, unspecified iron deficiency anemia type    5. Vitamin D deficiency          Assessment/ Plan     Preventative health care --- HCM discussed, check lab.  -     CBC auto differential; Future; Expected date: 06/25/2020  -     Comprehensive metabolic panel; Future; Expected date: 06/25/2020  -     TSH; Future; Expected date: 06/25/2020  -     Lipid  Panel; Future; Expected date: 06/25/2020  -     Hemoglobin A1C; Future; Expected date: 06/25/2020  -     Microalbumin/creatinine urine ratio  -     Vitamin D; Future; Expected date: 06/25/2020    Essential hypertension  -     CBC auto differential; Future; Expected date: 06/25/2020  -     Comprehensive metabolic panel; Future; Expected date: 06/25/2020  -     TSH; Future; Expected date: 06/25/2020  -     Lipid Panel; Future; Expected date: 06/25/2020  -     Hemoglobin A1C; Future; Expected date: 06/25/2020  -     Microalbumin/creatinine urine ratio    Prediabetes  -     CBC auto differential; Future; Expected date: 06/25/2020  -     Comprehensive metabolic panel; Future; Expected date: 06/25/2020  -     TSH; Future; Expected date: 06/25/2020  -     Lipid Panel; Future; Expected date: 06/25/2020  -     Hemoglobin A1C; Future; Expected date: 06/25/2020  -     Microalbumin/creatinine urine ratio    Iron deficiency anemia, unspecified iron deficiency anemia type --- followed by Hematology.  -     CBC auto differential; Future; Expected date: 06/25/2020  -     Comprehensive metabolic panel; Future; Expected date: 06/25/2020    Vitamin D deficiency  -     Vitamin D; Future; Expected date: 06/25/2020          Follow-up:  Lab pending.  Return prn.      Patient Care Team:  Franca Richmond MD as PCP - General (Family Medicine)  Lupe Torres MD as PCP - [facility] Attributed  Lupe Ellington MD as Consulting Physician (Gynecology)  Zan Lake LPN as Care Coordinator (Internal Medicine)  Sultana Daily MD (Obstetrics and Gynecology)        YAHIR Campbell  [facility]

## 2020-07-17 ENCOUNTER — TELEPHONE (OUTPATIENT)
Dept: FAMILY MEDICINE | Facility: CLINIC | Age: 50
End: 2020-07-17

## 2020-07-17 NOTE — TELEPHONE ENCOUNTER
----- Message from Anali Hubbard sent at 7/17/2020  3:11 PM CDT -----  Type:  Patient Returning Call    Who Called:pt  Who Left Message for Patient:nurse  Does the patient know what this is regarding?:test results  Would the patient rather a call back or a response via [facility]? Call back  Best Call Back Number:[phone]  Additional Information: .    Thank you

## 2020-08-20 RX ORDER — LOSARTAN POTASSIUM 50 MG/1
TABLET ORAL
Qty: 90 TABLET | Refills: 1 | Status: SHIPPED | OUTPATIENT
Start: 2020-08-20 | End: 2021-02-18

## 2020-08-29 DIAGNOSIS — E55.9 VITAMIN D DEFICIENCY: ICD-10-CM

## 2020-08-29 RX ORDER — ERGOCALCIFEROL 1.25 MG/1
CAPSULE ORAL
Qty: 4 CAPSULE | Refills: 5 | Status: SHIPPED | OUTPATIENT
Start: 2020-08-29 | End: 2021-03-29

## 2020-09-23 RX ORDER — METFORMIN HYDROCHLORIDE 500 MG/1
TABLET, EXTENDED RELEASE ORAL
Qty: 90 TABLET | Refills: 5 | Status: SHIPPED | OUTPATIENT
Start: 2020-09-23 | End: 2021-05-12

## 2020-10-05 ENCOUNTER — OFFICE VISIT (OUTPATIENT)
Dept: FAMILY MEDICINE | Facility: CLINIC | Age: 50
End: 2020-10-05
Payer: COMMERCIAL

## 2020-10-05 VITALS
DIASTOLIC BLOOD PRESSURE: 86 MMHG | HEART RATE: 83 BPM | BODY MASS INDEX: 48.82 KG/M2 | SYSTOLIC BLOOD PRESSURE: 136 MMHG | WEIGHT: 293 LBS | TEMPERATURE: 98 F | HEIGHT: 65 IN

## 2020-10-05 DIAGNOSIS — J30.2 SEASONAL ALLERGIC RHINITIS, UNSPECIFIED TRIGGER: Primary | ICD-10-CM

## 2020-10-05 PROCEDURE — 3079F PR MOST RECENT DIASTOLIC BLOOD PRESSURE 80-89 MM HG: ICD-10-PCS | Mod: CPTII,S$GLB,, | Performed by: REGISTERED NURSE

## 2020-10-05 PROCEDURE — 3008F PR BODY MASS INDEX (BMI) DOCUMENTED: ICD-10-PCS | Mod: CPTII,S$GLB,, | Performed by: REGISTERED NURSE

## 2020-10-05 PROCEDURE — 96372 THER/PROPH/DIAG INJ SC/IM: CPT | Mod: S$GLB,,, | Performed by: REGISTERED NURSE

## 2020-10-05 PROCEDURE — 99999 PR PBB SHADOW E&M-EST. PATIENT-LVL III: CPT | Mod: PBBFAC,,, | Performed by: REGISTERED NURSE

## 2020-10-05 PROCEDURE — 3079F DIAST BP 80-89 MM HG: CPT | Mod: CPTII,S$GLB,, | Performed by: REGISTERED NURSE

## 2020-10-05 PROCEDURE — 3075F PR MOST RECENT SYSTOLIC BLOOD PRESS GE 130-139MM HG: ICD-10-PCS | Mod: CPTII,S$GLB,, | Performed by: REGISTERED NURSE

## 2020-10-05 PROCEDURE — 99213 OFFICE O/P EST LOW 20 MIN: CPT | Mod: 25,S$GLB,, | Performed by: REGISTERED NURSE

## 2020-10-05 PROCEDURE — 99213 PR OFFICE/OUTPT VISIT, EST, LEVL III, 20-29 MIN: ICD-10-PCS | Mod: 25,S$GLB,, | Performed by: REGISTERED NURSE

## 2020-10-05 PROCEDURE — 96372 PR INJECTION,THERAP/PROPH/DIAG2ST, IM OR SUBCUT: ICD-10-PCS | Mod: S$GLB,,, | Performed by: REGISTERED NURSE

## 2020-10-05 PROCEDURE — 3008F BODY MASS INDEX DOCD: CPT | Mod: CPTII,S$GLB,, | Performed by: REGISTERED NURSE

## 2020-10-05 PROCEDURE — 99999 PR PBB SHADOW E&M-EST. PATIENT-LVL III: ICD-10-PCS | Mod: PBBFAC,,, | Performed by: REGISTERED NURSE

## 2020-10-05 PROCEDURE — 3075F SYST BP GE 130 - 139MM HG: CPT | Mod: CPTII,S$GLB,, | Performed by: REGISTERED NURSE

## 2020-10-05 RX ORDER — BETAMETHASONE SODIUM PHOSPHATE AND BETAMETHASONE ACETATE 3; 3 MG/ML; MG/ML
12 INJECTION, SUSPENSION INTRA-ARTICULAR; INTRALESIONAL; INTRAMUSCULAR; SOFT TISSUE
Status: COMPLETED | OUTPATIENT
Start: 2020-10-05 | End: 2020-10-05

## 2020-10-05 RX ADMIN — BETAMETHASONE SODIUM PHOSPHATE AND BETAMETHASONE ACETATE 12 MG: 3; 3 INJECTION, SUSPENSION INTRA-ARTICULAR; INTRALESIONAL; INTRAMUSCULAR; SOFT TISSUE at 05:10

## 2020-10-05 NOTE — PROGRESS NOTES
"Subjective:       Patient ID: Jenna Ovalle is a 50 y.o. female.    Chief Complaint   Patient presents with    Sinusitis       HPI    Jenna is here today with c/o sinus/allergy issues for the past few weeks.  States "usually acts up this time of year".  Treating with Xyzal.  Reports congestion, sore throat, sneezing with PND.  No f/c or cough.      Review of Systems   Constitutional: Negative for chills and fever.   HENT: Positive for congestion, postnasal drip and sore throat. Negative for drooling, ear discharge, ear pain, rhinorrhea, sinus pain and trouble swallowing.    Eyes: Negative.    Respiratory: Negative.  Negative for cough, shortness of breath and stridor.    Cardiovascular: Negative.    Gastrointestinal: Negative for abdominal pain, diarrhea, nausea and vomiting.   Musculoskeletal: Negative for myalgias and neck pain.   Skin: Negative.    Allergic/Immunologic: Positive for environmental allergies.   Neurological: Negative.  Negative for headaches.   Hematological: Negative for adenopathy.         Review of patient's allergies indicates:   Allergen Reactions    No known drug allergies          Patient Active Problem List   Diagnosis    Hypertension    Menorrhagia    AR (allergic rhinitis)    History of pulmonary embolism    Iron deficiency anemia    Morbid obesity with BMI of 50.0-59.9, adult    Arthritis of back    Prediabetes    Arthritis of knee    Vitamin D deficiency    Perimenopausal    Exotropia of left eye         Current Outpatient Medications:     aspirin (ECOTRIN) 81 MG EC tablet, Take 81 mg by mouth once daily., Disp: , Rfl:     carvediloL (COREG) 3.125 MG tablet, TK 1 T PO D, Disp: , Rfl:     ergocalciferol (ERGOCALCIFEROL) 50,000 unit Cap, TAKE 1 CAPSULE BY MOUTH EVERY 7 DAYS, Disp: 4 capsule, Rfl: 5    levocetirizine (XYZAL) 5 MG tablet, , Disp: , Rfl:     losartan (COZAAR) 50 MG tablet, TAKE 1 TABLET(50 MG) BY MOUTH EVERY DAY, Disp: 90 tablet, Rfl: 1    " "metFORMIN (GLUCOPHAGE-XR) 500 MG ER 24hr tablet, TAKE 3 TABLETS BY MOUTH EVERY DAY, Disp: 90 tablet, Rfl: 5        Past medical, surgical, family and social histories have been reviewed today.      Objective:     Vitals:    10/05/20 1640   BP: 136/86   Pulse: 83   Temp: 97.7 °F (36.5 °C)   TempSrc: Oral   Weight: (!) 145.8 kg (321 lb 6.9 oz)   Height: 5' 5" (1.651 m)   PainSc:   7   PainLoc: Throat         Physical Exam  Vitals signs reviewed.   Constitutional:       General: She is not in acute distress.     Appearance: She is well-developed.   HENT:      Head: Normocephalic and atraumatic.      Right Ear: Tympanic membrane, ear canal and external ear normal. There is no impacted cerumen.      Left Ear: Tympanic membrane, ear canal and external ear normal. There is no impacted cerumen.      Nose: Mucosal edema and congestion present. No rhinorrhea.      Right Turbinates: Pale.      Left Turbinates: Pale.      Mouth/Throat:      Mouth: Mucous membranes are moist.      Pharynx: Oropharynx is clear. No oropharyngeal exudate or posterior oropharyngeal erythema.   Eyes:      General:         Right eye: No discharge.         Left eye: No discharge.      Conjunctiva/sclera: Conjunctivae normal.      Pupils: Pupils are equal, round, and reactive to light.   Neck:      Musculoskeletal: Normal range of motion and neck supple.   Cardiovascular:      Rate and Rhythm: Normal rate and regular rhythm.      Pulses: Normal pulses.      Heart sounds: Normal heart sounds. No murmur. No gallop.    Pulmonary:      Effort: Pulmonary effort is normal.      Breath sounds: Normal breath sounds.   Musculoskeletal: Normal range of motion.   Lymphadenopathy:      Cervical: No cervical adenopathy.   Skin:     General: Skin is warm and dry.      Capillary Refill: Capillary refill takes less than 2 seconds.      Findings: No rash.   Neurological:      Mental Status: She is alert and oriented to person, place, and time.   Psychiatric:         " Mood and Affect: Mood normal.         Behavior: Behavior normal.         Thought Content: Thought content normal.         Judgment: Judgment normal.           Diagnosis       1. Seasonal allergic rhinitis, unspecified trigger          Assessment/ Plan     Seasonal allergic rhinitis, unspecified trigger --- exacerbation of chronic issue.  Xyzal daily in AM or can change over to Zyrtec or Allegra.  Flonase if needed.  Symptomatic care, rest & fluids.  -     betamethasone acetate-betamethasone sodium phosphate injection 12 mg        Follow-up:  Return or contact office back in 2 to 3 days if worse or no better.  Otherwise, return prn.        YAHIR Campbell  [facility]

## 2020-10-24 ENCOUNTER — PATIENT MESSAGE (OUTPATIENT)
Dept: FAMILY MEDICINE | Facility: CLINIC | Age: 50
End: 2020-10-24

## 2020-10-27 ENCOUNTER — TELEPHONE (OUTPATIENT)
Dept: FAMILY MEDICINE | Facility: CLINIC | Age: 50
End: 2020-10-27

## 2020-10-27 ENCOUNTER — IMMUNIZATION (OUTPATIENT)
Dept: FAMILY MEDICINE | Facility: CLINIC | Age: 50
End: 2020-10-27
Payer: COMMERCIAL

## 2020-10-27 PROCEDURE — 90686 IIV4 VACC NO PRSV 0.5 ML IM: CPT | Mod: S$GLB,,, | Performed by: FAMILY MEDICINE

## 2020-10-27 PROCEDURE — 90686 FLU VACCINE (QUAD) GREATER THAN OR EQUAL TO 3YO PRESERVATIVE FREE IM: ICD-10-PCS | Mod: S$GLB,,, | Performed by: FAMILY MEDICINE

## 2020-10-27 PROCEDURE — 90471 FLU VACCINE (QUAD) GREATER THAN OR EQUAL TO 3YO PRESERVATIVE FREE IM: ICD-10-PCS | Mod: S$GLB,,, | Performed by: FAMILY MEDICINE

## 2020-10-27 PROCEDURE — 90471 IMMUNIZATION ADMIN: CPT | Mod: S$GLB,,, | Performed by: FAMILY MEDICINE

## 2020-10-27 NOTE — TELEPHONE ENCOUNTER
----- Message from Georgina Irving sent at 10/27/2020  2:20 PM CDT -----  Regarding: Paperwork  Pt is calling to inform Staff she will be in the office at 3:00 pm today for her flu shot and would like to retrieve the paperwork for her Medical Certification for Mob[employer].  Pt says she usually gets the temporary one; current one .    Thank you.

## 2020-12-03 ENCOUNTER — OFFICE VISIT (OUTPATIENT)
Dept: FAMILY MEDICINE | Facility: CLINIC | Age: 50
End: 2020-12-03
Payer: COMMERCIAL

## 2020-12-03 VITALS
DIASTOLIC BLOOD PRESSURE: 80 MMHG | TEMPERATURE: 98 F | HEIGHT: 65 IN | SYSTOLIC BLOOD PRESSURE: 120 MMHG | RESPIRATION RATE: 16 BRPM | BODY MASS INDEX: 48.82 KG/M2 | OXYGEN SATURATION: 100 % | HEART RATE: 86 BPM | WEIGHT: 293 LBS

## 2020-12-03 DIAGNOSIS — J30.2 SEASONAL ALLERGIC RHINITIS, UNSPECIFIED TRIGGER: ICD-10-CM

## 2020-12-03 DIAGNOSIS — R73.03 PREDIABETES: ICD-10-CM

## 2020-12-03 DIAGNOSIS — M17.10 ARTHRITIS OF KNEE: ICD-10-CM

## 2020-12-03 DIAGNOSIS — I10 ESSENTIAL HYPERTENSION: Primary | ICD-10-CM

## 2020-12-03 DIAGNOSIS — D50.9 IRON DEFICIENCY ANEMIA, UNSPECIFIED IRON DEFICIENCY ANEMIA TYPE: ICD-10-CM

## 2020-12-03 DIAGNOSIS — N95.1 PERIMENOPAUSAL: ICD-10-CM

## 2020-12-03 DIAGNOSIS — E66.01 MORBID OBESITY WITH BMI OF 50.0-59.9, ADULT: ICD-10-CM

## 2020-12-03 DIAGNOSIS — E55.9 VITAMIN D DEFICIENCY: ICD-10-CM

## 2020-12-03 PROCEDURE — 3074F SYST BP LT 130 MM HG: CPT | Mod: CPTII,S$GLB,, | Performed by: FAMILY MEDICINE

## 2020-12-03 PROCEDURE — 3079F PR MOST RECENT DIASTOLIC BLOOD PRESSURE 80-89 MM HG: ICD-10-PCS | Mod: CPTII,S$GLB,, | Performed by: FAMILY MEDICINE

## 2020-12-03 PROCEDURE — 1126F AMNT PAIN NOTED NONE PRSNT: CPT | Mod: S$GLB,,, | Performed by: FAMILY MEDICINE

## 2020-12-03 PROCEDURE — 99214 OFFICE O/P EST MOD 30 MIN: CPT | Mod: S$GLB,,, | Performed by: FAMILY MEDICINE

## 2020-12-03 PROCEDURE — 3079F DIAST BP 80-89 MM HG: CPT | Mod: CPTII,S$GLB,, | Performed by: FAMILY MEDICINE

## 2020-12-03 PROCEDURE — 3008F PR BODY MASS INDEX (BMI) DOCUMENTED: ICD-10-PCS | Mod: CPTII,S$GLB,, | Performed by: FAMILY MEDICINE

## 2020-12-03 PROCEDURE — 3074F PR MOST RECENT SYSTOLIC BLOOD PRESSURE < 130 MM HG: ICD-10-PCS | Mod: CPTII,S$GLB,, | Performed by: FAMILY MEDICINE

## 2020-12-03 PROCEDURE — 3008F BODY MASS INDEX DOCD: CPT | Mod: CPTII,S$GLB,, | Performed by: FAMILY MEDICINE

## 2020-12-03 PROCEDURE — 99999 PR PBB SHADOW E&M-EST. PATIENT-LVL IV: ICD-10-PCS | Mod: PBBFAC,,, | Performed by: FAMILY MEDICINE

## 2020-12-03 PROCEDURE — 99214 PR OFFICE/OUTPT VISIT, EST, LEVL IV, 30-39 MIN: ICD-10-PCS | Mod: S$GLB,,, | Performed by: FAMILY MEDICINE

## 2020-12-03 PROCEDURE — 1126F PR PAIN SEVERITY QUANTIFIED, NO PAIN PRESENT: ICD-10-PCS | Mod: S$GLB,,, | Performed by: FAMILY MEDICINE

## 2020-12-03 PROCEDURE — 99999 PR PBB SHADOW E&M-EST. PATIENT-LVL IV: CPT | Mod: PBBFAC,,, | Performed by: FAMILY MEDICINE

## 2020-12-03 NOTE — PROGRESS NOTES
Jenna Ovalle    Chief Complaint   Patient presents with    Hypertension    Follow-up       History of Present Illness:   Ms. Ovalle comes in today for 6-month hypertension follow-up.  She is fasting and has taken medications today.  She states she has not been exercising or monitoring her diet.  She states she has been working from home during COVID-19 pandemic.    She states she had steroid shot in September 2020 for allergies.  She states she takes Mucinex and uses nasal spray with help.    She complains of having knee pain and swelling and states she takes over-the-counter medication with help.    She saw Dr. Mitali Mathew, hematologist, on November 18, 2020 for surveillance of IV a with stable lab findings and with 6-month follow-up advised.  She states Dr. bryan campbell advised her to have colonoscopy which she states she will get in 2021.  She states she had iron infusions in summer 2020.  She is perimenopausal.    She states she follows with cardiologist Dr. Francisca Rust for surveillance of hypertension, cardiomegaly with last visit on August 10, 2020 with 6-month follow-up advised.    Otherwise, she denies having fever, chills, fatigue, appetite changes; shortness of breath, cough, wheezing; chest pain, palpitations, leg swelling; abdominal pain, nausea, vomiting, diarrhea, constipation; unusual urinary symptoms; polydipsia, polyuria, polyphagia; back pain; headache; anxiety, depression, homicidal or suicidal thoughts.      Labs:                    WBC                      4.59                06/25/2020                 HGB                      13.0                06/25/2020                 HCT                      42.5                06/25/2020                 PLT                      362 (H)             06/25/2020                 CHOL                     210 (H)             06/25/2020                 TRIG                     71                  06/25/2020                 HDL                       65                  06/25/2020                 ALT                      18                  06/25/2020                 AST                      18                  06/25/2020                 NA                       139                 06/25/2020                 K                        4.2                 06/25/2020                 CL                       105                 06/25/2020                 CREATININE               0.7                 06/25/2020                 BUN                      7                   06/25/2020                 CO2                      26                  06/25/2020                 TSH                      1.222               06/25/2020                 INR                      4.1 (H)             10/07/2010                 HGBA1C                   5.5                 06/25/2020               LDLCALC                  130.8               06/25/2020         Vit D, 25-Hydroxy               20          06/25/2020      Current Outpatient Medications   Medication Sig    aspirin (ECOTRIN) 81 MG EC tablet Take 81 mg by mouth once daily.    carvediloL (COREG) 3.125 MG tablet TK 1 T PO D    ergocalciferol (ERGOCALCIFEROL) 50,000 unit Cap TAKE 1 CAPSULE BY MOUTH EVERY 7 DAYS    levocetirizine (XYZAL) 5 MG tablet     losartan (COZAAR) 50 MG tablet TAKE 1 TABLET(50 MG) BY MOUTH EVERY DAY (Patient taking differently: Take 50 mg by mouth 2 (two) times daily. )    metFORMIN (GLUCOPHAGE-XR) 500 MG ER 24hr tablet TAKE 3 TABLETS BY MOUTH EVERY DAY     Review of Systems   Constitutional: Negative for activity change, appetite change, chills, fatigue, fever and unexpected weight change.        Weight 144.9 kg (319 lb 7.1 oz) at June 25, 2020 visit.   HENT:        See history of present illness.   Respiratory: Negative for cough, shortness of breath and wheezing.    Cardiovascular: Negative for chest pain, palpitations and leg swelling.        See history of present illness.   Gastrointestinal:  Negative for abdominal pain, constipation, diarrhea, nausea and vomiting.   Endocrine: Negative for polydipsia, polyphagia and polyuria.        See history of present illness.   Genitourinary: Positive for menstrual problem. Negative for difficulty urinating.   Musculoskeletal: Positive for arthralgias and joint swelling. Negative for back pain.        See history of present illness.   Neurological: Negative for headaches.   Hematological:        See history of present illness.   Psychiatric/Behavioral: Negative for dysphoric mood and suicidal ideas. The patient is not nervous/anxious.         Negative for homicidal ideas.        Objective:  Physical Exam  Vitals signs reviewed.   Constitutional:       General: She is not in acute distress.     Appearance: Normal appearance. She is well-developed. She is obese. She is not ill-appearing or diaphoretic.      Comments: Obese and pleasant.   Neck:      Musculoskeletal: Normal range of motion and neck supple.      Thyroid: No thyromegaly.   Cardiovascular:      Rate and Rhythm: Normal rate and regular rhythm.      Heart sounds: Normal heart sounds. No murmur.   Pulmonary:      Effort: Pulmonary effort is normal. No respiratory distress.      Breath sounds: Normal breath sounds. No wheezing.   Abdominal:      General: Bowel sounds are normal. There is no distension.      Palpations: Abdomen is soft. There is no mass.      Tenderness: There is no abdominal tenderness. There is no guarding or rebound.   Musculoskeletal: Normal range of motion.         General: No swelling or tenderness.      Comments: She is ambulatory without problems.   Lymphadenopathy:      Cervical: No cervical adenopathy.   Neurological:      General: No focal deficit present.      Mental Status: She is alert and oriented to person, place, and time.   Psychiatric:         Mood and Affect: Mood normal.         Behavior: Behavior normal.         Thought Content: Thought content normal.         Judgment:  Judgment normal.         ASSESSMENT:  1. Essential hypertension    2. Prediabetes    3. Vitamin D deficiency    4. Iron deficiency anemia, unspecified iron deficiency anemia type    5. Seasonal allergic rhinitis, unspecified trigger    6. Arthritis of knee    7. Perimenopausal    8. Morbid obesity with BMI of 50.0-59.9, adult        PLAN:  Jenna was seen today for hypertension and follow-up.    Diagnoses and all orders for this visit:    Essential hypertension    Prediabetes    Vitamin D deficiency    Iron deficiency anemia, unspecified iron deficiency anemia type    Seasonal allergic rhinitis, unspecified trigger    Arthritis of knee    Perimenopausal    Morbid obesity with BMI of 50.0-59.9, adult       No labs today.  Continue current medications, follow low sodium, low cholesterol, low carb diet, daily walks.  Patient desired lab testing for blood type and screen but will 1st check with insurance for coverage.  Keep follow up with specialists.  Follow up in about 7 months (around 6/29/2021) for physical.

## 2020-12-09 ENCOUNTER — PATIENT OUTREACH (OUTPATIENT)
Dept: ADMINISTRATIVE | Facility: HOSPITAL | Age: 50
End: 2020-12-09

## 2021-02-17 ENCOUNTER — PATIENT MESSAGE (OUTPATIENT)
Dept: FAMILY MEDICINE | Facility: CLINIC | Age: 51
End: 2021-02-17

## 2021-02-18 RX ORDER — LOSARTAN POTASSIUM 50 MG/1
TABLET ORAL
Qty: 90 TABLET | Refills: 1 | Status: SHIPPED | OUTPATIENT
Start: 2021-02-18 | End: 2023-01-12

## 2021-03-12 ENCOUNTER — IMMUNIZATION (OUTPATIENT)
Dept: PHARMACY | Facility: CLINIC | Age: 51
End: 2021-03-12
Payer: COMMERCIAL

## 2021-03-12 DIAGNOSIS — Z23 NEED FOR VACCINATION: Primary | ICD-10-CM

## 2021-05-26 ENCOUNTER — TELEPHONE (OUTPATIENT)
Dept: INTERNAL MEDICINE | Facility: CLINIC | Age: 51
End: 2021-05-26

## 2021-06-02 ENCOUNTER — NURSE TRIAGE (OUTPATIENT)
Dept: ADMINISTRATIVE | Facility: CLINIC | Age: 51
End: 2021-06-02

## 2021-06-16 ENCOUNTER — OFFICE VISIT (OUTPATIENT)
Dept: URGENT CARE | Facility: CLINIC | Age: 51
End: 2021-06-16
Payer: COMMERCIAL

## 2021-06-16 VITALS
HEART RATE: 61 BPM | HEIGHT: 65 IN | BODY MASS INDEX: 48.82 KG/M2 | RESPIRATION RATE: 20 BRPM | SYSTOLIC BLOOD PRESSURE: 132 MMHG | DIASTOLIC BLOOD PRESSURE: 71 MMHG | WEIGHT: 293 LBS | OXYGEN SATURATION: 99 %

## 2021-06-16 DIAGNOSIS — R05.9 COUGH: ICD-10-CM

## 2021-06-16 DIAGNOSIS — R09.82 PND (POST-NASAL DRIP): ICD-10-CM

## 2021-06-16 DIAGNOSIS — J30.9 ALLERGIC SINUSITIS: Primary | ICD-10-CM

## 2021-06-16 PROCEDURE — 3008F PR BODY MASS INDEX (BMI) DOCUMENTED: ICD-10-PCS | Mod: CPTII,S$GLB,, | Performed by: PHYSICIAN ASSISTANT

## 2021-06-16 PROCEDURE — 99214 OFFICE O/P EST MOD 30 MIN: CPT | Mod: S$GLB,,, | Performed by: PHYSICIAN ASSISTANT

## 2021-06-16 PROCEDURE — 1126F AMNT PAIN NOTED NONE PRSNT: CPT | Mod: S$GLB,,, | Performed by: PHYSICIAN ASSISTANT

## 2021-06-16 PROCEDURE — 99214 PR OFFICE/OUTPT VISIT, EST, LEVL IV, 30-39 MIN: ICD-10-PCS | Mod: S$GLB,,, | Performed by: PHYSICIAN ASSISTANT

## 2021-06-16 PROCEDURE — 1126F PR PAIN SEVERITY QUANTIFIED, NO PAIN PRESENT: ICD-10-PCS | Mod: S$GLB,,, | Performed by: PHYSICIAN ASSISTANT

## 2021-06-16 PROCEDURE — 3008F BODY MASS INDEX DOCD: CPT | Mod: CPTII,S$GLB,, | Performed by: PHYSICIAN ASSISTANT

## 2021-06-16 RX ORDER — BENZONATATE 100 MG/1
200 CAPSULE ORAL 3 TIMES DAILY PRN
Qty: 30 CAPSULE | Refills: 0 | Status: SHIPPED | OUTPATIENT
Start: 2021-06-16 | End: 2021-07-02

## 2021-06-16 RX ORDER — METHYLPREDNISOLONE 4 MG/1
TABLET ORAL
Qty: 1 PACKAGE | Refills: 0 | Status: SHIPPED | OUTPATIENT
Start: 2021-06-16 | End: 2021-06-29

## 2021-06-18 RX ORDER — LEVOCETIRIZINE DIHYDROCHLORIDE 5 MG/1
TABLET, FILM COATED ORAL
Qty: 30 TABLET | Refills: 5 | Status: SHIPPED | OUTPATIENT
Start: 2021-06-18 | End: 2022-07-06

## 2021-06-19 ENCOUNTER — TELEPHONE (OUTPATIENT)
Dept: URGENT CARE | Facility: CLINIC | Age: 51
End: 2021-06-19

## 2021-06-29 ENCOUNTER — OFFICE VISIT (OUTPATIENT)
Dept: FAMILY MEDICINE | Facility: CLINIC | Age: 51
End: 2021-06-29
Payer: COMMERCIAL

## 2021-06-29 VITALS
HEART RATE: 87 BPM | RESPIRATION RATE: 18 BRPM | OXYGEN SATURATION: 96 % | DIASTOLIC BLOOD PRESSURE: 77 MMHG | WEIGHT: 293 LBS | TEMPERATURE: 98 F | HEIGHT: 65 IN | BODY MASS INDEX: 48.82 KG/M2 | SYSTOLIC BLOOD PRESSURE: 125 MMHG

## 2021-06-29 DIAGNOSIS — D50.9 IRON DEFICIENCY ANEMIA, UNSPECIFIED IRON DEFICIENCY ANEMIA TYPE: ICD-10-CM

## 2021-06-29 DIAGNOSIS — Z00.00 ANNUAL PHYSICAL EXAM: Primary | ICD-10-CM

## 2021-06-29 DIAGNOSIS — E55.9 VITAMIN D DEFICIENCY: ICD-10-CM

## 2021-06-29 DIAGNOSIS — R73.03 PREDIABETES: ICD-10-CM

## 2021-06-29 DIAGNOSIS — J30.2 SEASONAL ALLERGIC RHINITIS, UNSPECIFIED TRIGGER: ICD-10-CM

## 2021-06-29 DIAGNOSIS — I10 ESSENTIAL HYPERTENSION: ICD-10-CM

## 2021-06-29 DIAGNOSIS — E66.01 MORBID OBESITY WITH BMI OF 50.0-59.9, ADULT: ICD-10-CM

## 2021-06-29 DIAGNOSIS — N95.1 PERIMENOPAUSAL: ICD-10-CM

## 2021-06-29 DIAGNOSIS — M17.10 ARTHRITIS OF KNEE: ICD-10-CM

## 2021-06-29 PROCEDURE — 1126F PR PAIN SEVERITY QUANTIFIED, NO PAIN PRESENT: ICD-10-PCS | Mod: S$GLB,,, | Performed by: FAMILY MEDICINE

## 2021-06-29 PROCEDURE — 99999 PR PBB SHADOW E&M-EST. PATIENT-LVL IV: CPT | Mod: PBBFAC,,, | Performed by: FAMILY MEDICINE

## 2021-06-29 PROCEDURE — 99396 PR PREVENTIVE VISIT,EST,40-64: ICD-10-PCS | Mod: S$GLB,,, | Performed by: FAMILY MEDICINE

## 2021-06-29 PROCEDURE — 3008F PR BODY MASS INDEX (BMI) DOCUMENTED: ICD-10-PCS | Mod: CPTII,S$GLB,, | Performed by: FAMILY MEDICINE

## 2021-06-29 PROCEDURE — 3008F BODY MASS INDEX DOCD: CPT | Mod: CPTII,S$GLB,, | Performed by: FAMILY MEDICINE

## 2021-06-29 PROCEDURE — 1126F AMNT PAIN NOTED NONE PRSNT: CPT | Mod: S$GLB,,, | Performed by: FAMILY MEDICINE

## 2021-06-29 PROCEDURE — 99396 PREV VISIT EST AGE 40-64: CPT | Mod: S$GLB,,, | Performed by: FAMILY MEDICINE

## 2021-06-29 PROCEDURE — 99999 PR PBB SHADOW E&M-EST. PATIENT-LVL IV: ICD-10-PCS | Mod: PBBFAC,,, | Performed by: FAMILY MEDICINE

## 2021-07-02 ENCOUNTER — OFFICE VISIT (OUTPATIENT)
Dept: FAMILY MEDICINE | Facility: CLINIC | Age: 51
End: 2021-07-02
Payer: COMMERCIAL

## 2021-07-02 ENCOUNTER — LAB VISIT (OUTPATIENT)
Dept: LAB | Facility: HOSPITAL | Age: 51
End: 2021-07-02
Attending: FAMILY MEDICINE
Payer: COMMERCIAL

## 2021-07-02 VITALS
HEART RATE: 77 BPM | TEMPERATURE: 99 F | HEIGHT: 65 IN | DIASTOLIC BLOOD PRESSURE: 70 MMHG | OXYGEN SATURATION: 96 % | BODY MASS INDEX: 48.82 KG/M2 | SYSTOLIC BLOOD PRESSURE: 110 MMHG | WEIGHT: 293 LBS | RESPIRATION RATE: 18 BRPM

## 2021-07-02 DIAGNOSIS — J30.9 ALLERGIC RHINITIS, UNSPECIFIED SEASONALITY, UNSPECIFIED TRIGGER: Primary | ICD-10-CM

## 2021-07-02 DIAGNOSIS — R05.9 COUGH: ICD-10-CM

## 2021-07-02 DIAGNOSIS — Z00.00 ANNUAL PHYSICAL EXAM: ICD-10-CM

## 2021-07-02 LAB
ALBUMIN SERPL BCP-MCNC: 3 G/DL (ref 3.5–5.2)
ALP SERPL-CCNC: 80 U/L (ref 55–135)
ALT SERPL W/O P-5'-P-CCNC: 14 U/L (ref 10–44)
ANION GAP SERPL CALC-SCNC: 12 MMOL/L (ref 8–16)
AST SERPL-CCNC: 14 U/L (ref 10–40)
BASOPHILS # BLD AUTO: 0.02 K/UL (ref 0–0.2)
BASOPHILS NFR BLD: 0.4 % (ref 0–1.9)
BILIRUB SERPL-MCNC: 0.3 MG/DL (ref 0.1–1)
BUN SERPL-MCNC: 8 MG/DL (ref 6–20)
CALCIUM SERPL-MCNC: 9.2 MG/DL (ref 8.7–10.5)
CHLORIDE SERPL-SCNC: 105 MMOL/L (ref 95–110)
CHOLEST SERPL-MCNC: 183 MG/DL (ref 120–199)
CHOLEST/HDLC SERPL: 3.1 {RATIO} (ref 2–5)
CO2 SERPL-SCNC: 24 MMOL/L (ref 23–29)
CREAT SERPL-MCNC: 0.8 MG/DL (ref 0.5–1.4)
DIFFERENTIAL METHOD: ABNORMAL
EOSINOPHIL # BLD AUTO: 0.1 K/UL (ref 0–0.5)
EOSINOPHIL NFR BLD: 1.5 % (ref 0–8)
ERYTHROCYTE [DISTWIDTH] IN BLOOD BY AUTOMATED COUNT: 15 % (ref 11.5–14.5)
EST. GFR  (AFRICAN AMERICAN): >60 ML/MIN/1.73 M^2
EST. GFR  (NON AFRICAN AMERICAN): >60 ML/MIN/1.73 M^2
ESTIMATED AVG GLUCOSE: 123 MG/DL (ref 68–131)
FERRITIN SERPL-MCNC: 311 NG/ML (ref 20–300)
GLUCOSE SERPL-MCNC: 83 MG/DL (ref 70–110)
HBA1C MFR BLD: 5.9 % (ref 4–5.6)
HCT VFR BLD AUTO: 38.8 % (ref 37–48.5)
HDLC SERPL-MCNC: 60 MG/DL (ref 40–75)
HDLC SERPL: 32.8 % (ref 20–50)
HGB BLD-MCNC: 12.3 G/DL (ref 12–16)
IMM GRANULOCYTES # BLD AUTO: 0.01 K/UL (ref 0–0.04)
IMM GRANULOCYTES NFR BLD AUTO: 0.2 % (ref 0–0.5)
LDLC SERPL CALC-MCNC: 109.8 MG/DL (ref 63–159)
LYMPHOCYTES # BLD AUTO: 1.5 K/UL (ref 1–4.8)
LYMPHOCYTES NFR BLD: 30.6 % (ref 18–48)
MCH RBC QN AUTO: 28.9 PG (ref 27–31)
MCHC RBC AUTO-ENTMCNC: 31.7 G/DL (ref 32–36)
MCV RBC AUTO: 91 FL (ref 82–98)
MONOCYTES # BLD AUTO: 0.6 K/UL (ref 0.3–1)
MONOCYTES NFR BLD: 11.6 % (ref 4–15)
NEUTROPHILS # BLD AUTO: 2.7 K/UL (ref 1.8–7.7)
NEUTROPHILS NFR BLD: 55.7 % (ref 38–73)
NONHDLC SERPL-MCNC: 123 MG/DL
NRBC BLD-RTO: 0 /100 WBC
PLATELET # BLD AUTO: 322 K/UL (ref 150–450)
PMV BLD AUTO: 10.8 FL (ref 9.2–12.9)
POTASSIUM SERPL-SCNC: 3.7 MMOL/L (ref 3.5–5.1)
PROT SERPL-MCNC: 7.6 G/DL (ref 6–8.4)
RBC # BLD AUTO: 4.26 M/UL (ref 4–5.4)
SODIUM SERPL-SCNC: 141 MMOL/L (ref 136–145)
TRIGL SERPL-MCNC: 66 MG/DL (ref 30–150)
TSH SERPL DL<=0.005 MIU/L-ACNC: 0.99 UIU/ML (ref 0.4–4)
WBC # BLD AUTO: 4.81 K/UL (ref 3.9–12.7)

## 2021-07-02 PROCEDURE — 99999 PR PBB SHADOW E&M-EST. PATIENT-LVL IV: CPT | Mod: PBBFAC,,, | Performed by: REGISTERED NURSE

## 2021-07-02 PROCEDURE — 3008F PR BODY MASS INDEX (BMI) DOCUMENTED: ICD-10-PCS | Mod: CPTII,S$GLB,, | Performed by: REGISTERED NURSE

## 2021-07-02 PROCEDURE — 80053 COMPREHEN METABOLIC PANEL: CPT | Performed by: FAMILY MEDICINE

## 2021-07-02 PROCEDURE — 3074F PR MOST RECENT SYSTOLIC BLOOD PRESSURE < 130 MM HG: ICD-10-PCS | Mod: CPTII,S$GLB,, | Performed by: REGISTERED NURSE

## 2021-07-02 PROCEDURE — 99214 PR OFFICE/OUTPT VISIT, EST, LEVL IV, 30-39 MIN: ICD-10-PCS | Mod: 25,S$GLB,, | Performed by: REGISTERED NURSE

## 2021-07-02 PROCEDURE — 99999 PR PBB SHADOW E&M-EST. PATIENT-LVL IV: ICD-10-PCS | Mod: PBBFAC,,, | Performed by: REGISTERED NURSE

## 2021-07-02 PROCEDURE — 83036 HEMOGLOBIN GLYCOSYLATED A1C: CPT | Performed by: FAMILY MEDICINE

## 2021-07-02 PROCEDURE — 3074F SYST BP LT 130 MM HG: CPT | Mod: CPTII,S$GLB,, | Performed by: REGISTERED NURSE

## 2021-07-02 PROCEDURE — 1126F PR PAIN SEVERITY QUANTIFIED, NO PAIN PRESENT: ICD-10-PCS | Mod: CPTII,S$GLB,, | Performed by: REGISTERED NURSE

## 2021-07-02 PROCEDURE — 84443 ASSAY THYROID STIM HORMONE: CPT | Performed by: FAMILY MEDICINE

## 2021-07-02 PROCEDURE — 80061 LIPID PANEL: CPT | Performed by: FAMILY MEDICINE

## 2021-07-02 PROCEDURE — 96372 PR INJECTION,THERAP/PROPH/DIAG2ST, IM OR SUBCUT: ICD-10-PCS | Mod: S$GLB,,, | Performed by: REGISTERED NURSE

## 2021-07-02 PROCEDURE — 3008F BODY MASS INDEX DOCD: CPT | Mod: CPTII,S$GLB,, | Performed by: REGISTERED NURSE

## 2021-07-02 PROCEDURE — 99214 OFFICE O/P EST MOD 30 MIN: CPT | Mod: 25,S$GLB,, | Performed by: REGISTERED NURSE

## 2021-07-02 PROCEDURE — 3078F DIAST BP <80 MM HG: CPT | Mod: CPTII,S$GLB,, | Performed by: REGISTERED NURSE

## 2021-07-02 PROCEDURE — 36415 COLL VENOUS BLD VENIPUNCTURE: CPT | Mod: PO | Performed by: FAMILY MEDICINE

## 2021-07-02 PROCEDURE — 1126F AMNT PAIN NOTED NONE PRSNT: CPT | Mod: CPTII,S$GLB,, | Performed by: REGISTERED NURSE

## 2021-07-02 PROCEDURE — 82306 VITAMIN D 25 HYDROXY: CPT | Performed by: FAMILY MEDICINE

## 2021-07-02 PROCEDURE — 86803 HEPATITIS C AB TEST: CPT | Performed by: FAMILY MEDICINE

## 2021-07-02 PROCEDURE — 96372 THER/PROPH/DIAG INJ SC/IM: CPT | Mod: S$GLB,,, | Performed by: REGISTERED NURSE

## 2021-07-02 PROCEDURE — 3078F PR MOST RECENT DIASTOLIC BLOOD PRESSURE < 80 MM HG: ICD-10-PCS | Mod: CPTII,S$GLB,, | Performed by: REGISTERED NURSE

## 2021-07-02 PROCEDURE — 82728 ASSAY OF FERRITIN: CPT | Performed by: FAMILY MEDICINE

## 2021-07-02 PROCEDURE — 85025 COMPLETE CBC W/AUTO DIFF WBC: CPT | Performed by: FAMILY MEDICINE

## 2021-07-02 RX ORDER — BENZONATATE 100 MG/1
200 CAPSULE ORAL 3 TIMES DAILY PRN
Qty: 30 CAPSULE | Refills: 0 | Status: SHIPPED | OUTPATIENT
Start: 2021-07-02 | End: 2022-01-04

## 2021-07-02 RX ORDER — CHROMIUM PICOLINATE 200 MCG
TABLET ORAL
COMMUNITY
Start: 2020-11-18 | End: 2023-03-14

## 2021-07-02 RX ORDER — CARVEDILOL 6.25 MG/1
6.25 TABLET ORAL 2 TIMES DAILY WITH MEALS
COMMUNITY
End: 2022-02-22

## 2021-07-02 RX ORDER — ZINC SULFATE 50(220)MG
CAPSULE ORAL
COMMUNITY
Start: 2020-10-20 | End: 2023-01-12

## 2021-07-02 RX ORDER — DEXAMETHASONE SODIUM PHOSPHATE 4 MG/ML
8 INJECTION, SOLUTION INTRA-ARTICULAR; INTRALESIONAL; INTRAMUSCULAR; INTRAVENOUS; SOFT TISSUE
Status: COMPLETED | OUTPATIENT
Start: 2021-07-02 | End: 2021-07-02

## 2021-07-02 RX ADMIN — DEXAMETHASONE SODIUM PHOSPHATE 8 MG: 4 INJECTION, SOLUTION INTRA-ARTICULAR; INTRALESIONAL; INTRAMUSCULAR; INTRAVENOUS; SOFT TISSUE at 09:07

## 2021-07-03 LAB — 25(OH)D3+25(OH)D2 SERPL-MCNC: 20 NG/ML (ref 30–96)

## 2021-07-05 LAB — HCV AB SERPL QL IA: NEGATIVE

## 2021-11-05 ENCOUNTER — IMMUNIZATION (OUTPATIENT)
Dept: PHARMACY | Facility: CLINIC | Age: 51
End: 2021-11-05
Payer: COMMERCIAL

## 2021-11-05 DIAGNOSIS — Z23 NEED FOR VACCINATION: Primary | ICD-10-CM

## 2021-11-26 ENCOUNTER — IMMUNIZATION (OUTPATIENT)
Dept: FAMILY MEDICINE | Facility: CLINIC | Age: 51
End: 2021-11-26
Payer: COMMERCIAL

## 2021-11-26 PROCEDURE — 90686 IIV4 VACC NO PRSV 0.5 ML IM: CPT | Mod: S$GLB,,, | Performed by: FAMILY MEDICINE

## 2021-11-26 PROCEDURE — 90471 IMMUNIZATION ADMIN: CPT | Mod: S$GLB,,, | Performed by: FAMILY MEDICINE

## 2021-11-26 PROCEDURE — 90686 FLU VACCINE (QUAD) GREATER THAN OR EQUAL TO 3YO PRESERVATIVE FREE IM: ICD-10-PCS | Mod: S$GLB,,, | Performed by: FAMILY MEDICINE

## 2021-11-26 PROCEDURE — 90471 FLU VACCINE (QUAD) GREATER THAN OR EQUAL TO 3YO PRESERVATIVE FREE IM: ICD-10-PCS | Mod: S$GLB,,, | Performed by: FAMILY MEDICINE

## 2021-11-30 LAB
ALBUMIN SERPL BCP-MCNC: 3.7 G/DL (ref 3.5–5.7)
ALBUMIN/GLOBULIN RATIO: 0.9 (ref 0.7–2)
ALP SERPL-CCNC: 85 U/L (ref 34–104)
ALT SERPL W P-5'-P-CCNC: 14 U/L (ref 7–52)
AST SERPL-CCNC: 13 U/L (ref 13–39)
BILIRUB SERPL-MCNC: 0.3 MG/DL (ref 0.3–1.9)
BUN SERPL-MCNC: 11 MG/DL (ref 7–25)
CALCIUM SERPL-MCNC: 9 MG/DL (ref 8.6–10.5)
CHLORIDE SERPL-SCNC: 105 MMOL/L (ref 98–109)
CHOLEST SERPL-MSCNC: 189 MG/DL (ref 0–199)
CO2 SERPL-SCNC: 23 MMOL/L (ref 22–34)
CREAT SERPL-MCNC: 0.6 MG/DL (ref 0.6–1.3)
EST. GFR  (AFRICAN AMERICAN): 127.5 ML/MIN/1.73 M2
GLOBULIN: 3.9 (ref 2–4.5)
GLUCOSE SERPL-MCNC: 95 MG/DL (ref 70–106)
HDL/CHOLESTEROL RATIO: 3 % (ref 0–4.4)
HDLC SERPL-MCNC: 64 MG/DL (ref 30–85)
LDL CHOLESTEROL DIRECT: 118 MG/DL (ref 0–130)
NON HDL CHOL. (LDL+VLDL): 125 (ref 0–129)
POTASSIUM SERPL-SCNC: 3.9 MMOL/L (ref 3.4–5)
PROT SNV-MCNC: 7.6 G/L (ref 6–8.5)
SODIUM BLD-SCNC: 141 MMOL/L (ref 135–145)
TRIGL SERPL-MCNC: 71 MG/DL (ref 10–250)
VLDLC SERPL-MCNC: 14 MG/DL (ref 5–40)

## 2022-01-04 ENCOUNTER — OFFICE VISIT (OUTPATIENT)
Dept: INTERNAL MEDICINE | Facility: CLINIC | Age: 52
End: 2022-01-04
Payer: COMMERCIAL

## 2022-01-04 VITALS
BODY MASS INDEX: 48.82 KG/M2 | HEIGHT: 65 IN | OXYGEN SATURATION: 96 % | DIASTOLIC BLOOD PRESSURE: 64 MMHG | TEMPERATURE: 96 F | WEIGHT: 293 LBS | HEART RATE: 82 BPM | SYSTOLIC BLOOD PRESSURE: 102 MMHG

## 2022-01-04 DIAGNOSIS — E55.9 VITAMIN D DEFICIENCY: ICD-10-CM

## 2022-01-04 DIAGNOSIS — E66.01 MORBID OBESITY WITH BMI OF 50.0-59.9, ADULT: ICD-10-CM

## 2022-01-04 DIAGNOSIS — I10 HYPERTENSION, UNSPECIFIED TYPE: Primary | ICD-10-CM

## 2022-01-04 DIAGNOSIS — R73.03 PREDIABETES: ICD-10-CM

## 2022-01-04 DIAGNOSIS — D50.9 IRON DEFICIENCY ANEMIA, UNSPECIFIED IRON DEFICIENCY ANEMIA TYPE: ICD-10-CM

## 2022-01-04 DIAGNOSIS — M47.9 ARTHRITIS OF BACK: ICD-10-CM

## 2022-01-04 DIAGNOSIS — M17.10 ARTHRITIS OF KNEE: ICD-10-CM

## 2022-01-04 PROCEDURE — 99999 PR PBB SHADOW E&M-EST. PATIENT-LVL IV: CPT | Mod: PBBFAC,,, | Performed by: FAMILY MEDICINE

## 2022-01-04 PROCEDURE — 3074F PR MOST RECENT SYSTOLIC BLOOD PRESSURE < 130 MM HG: ICD-10-PCS | Mod: CPTII,S$GLB,, | Performed by: FAMILY MEDICINE

## 2022-01-04 PROCEDURE — 3008F BODY MASS INDEX DOCD: CPT | Mod: CPTII,S$GLB,, | Performed by: FAMILY MEDICINE

## 2022-01-04 PROCEDURE — 99214 PR OFFICE/OUTPT VISIT, EST, LEVL IV, 30-39 MIN: ICD-10-PCS | Mod: S$GLB,,, | Performed by: FAMILY MEDICINE

## 2022-01-04 PROCEDURE — 3078F DIAST BP <80 MM HG: CPT | Mod: CPTII,S$GLB,, | Performed by: FAMILY MEDICINE

## 2022-01-04 PROCEDURE — 3078F PR MOST RECENT DIASTOLIC BLOOD PRESSURE < 80 MM HG: ICD-10-PCS | Mod: CPTII,S$GLB,, | Performed by: FAMILY MEDICINE

## 2022-01-04 PROCEDURE — 3008F PR BODY MASS INDEX (BMI) DOCUMENTED: ICD-10-PCS | Mod: CPTII,S$GLB,, | Performed by: FAMILY MEDICINE

## 2022-01-04 PROCEDURE — 3074F SYST BP LT 130 MM HG: CPT | Mod: CPTII,S$GLB,, | Performed by: FAMILY MEDICINE

## 2022-01-04 PROCEDURE — 99214 OFFICE O/P EST MOD 30 MIN: CPT | Mod: S$GLB,,, | Performed by: FAMILY MEDICINE

## 2022-01-04 PROCEDURE — 99999 PR PBB SHADOW E&M-EST. PATIENT-LVL IV: ICD-10-PCS | Mod: PBBFAC,,, | Performed by: FAMILY MEDICINE

## 2022-01-04 RX ORDER — TIZANIDINE 4 MG/1
4 TABLET ORAL NIGHTLY
COMMUNITY
Start: 2021-07-29 | End: 2022-02-22

## 2022-01-04 RX ORDER — CHLORHEXIDINE GLUCONATE ORAL RINSE 1.2 MG/ML
SOLUTION DENTAL
COMMUNITY
Start: 2021-12-09 | End: 2022-07-06

## 2022-01-04 RX ORDER — MELOXICAM 15 MG/1
15 TABLET ORAL DAILY
COMMUNITY
Start: 2021-07-29 | End: 2022-02-22

## 2022-01-04 NOTE — PROGRESS NOTES
Jenna MARIE Ovalle    Chief Complaint   Patient presents with    Follow-up    Hypertension       History of Present Illness:   Ms. Ovalle comes in today for hypertension follow-up.  She is fasting and has taken medications today.  She states she performs home blood pressure checks with levels ranging 126/70's.  She states she has not been exercising or monitoring her diet.  She reports having polyphagia and eats a lot of sweets.  She states she currently works from home 4 times a week and goes into the office 1 time a week.  She states she feels stressed out with work.     She complains of having knee pain and swelling with gaining weight. She states she saw Dr. Minaya, orthopedist with [facility], in September 2021 and states she received 3-shot series for help with right knee pain. She states he also prescribed Mobic and Tizanidine for bilateral shoulder pain.    She states she some times has spasms across low back and takes Tizanidine with help.     She saw Dr. Mitali Mathew, hematologist, on October 5, 2021 for surveillance of GABRIEL, vitamin D deficiency with 1-year follow up advised. She states she is not yet ready to get colonoscopy.    She states she is scheduled to see Dr. Sultana Daily, gynecologist, today as she is scheduled for pelvic ultrasound due to fibroids. She states Dr. Daily thinks she is likely postmenopausal.      She states she follows with cardiologist Dr. Abhi Marks for surveillance of hypertension, cardiomegaly with last visit on December 8, 2021 with 6-month follow-up advised.    She states she [facility] with last visit on December 28, 2021 and is waiting to start Ozempic pending insurance coverage.     Otherwise, she denies having fever, chills, fatigue, appetite changes; shortness of breath, cough, wheezing; chest pain, palpitations, leg swelling; abdominal pain, nausea, vomiting, diarrhea, constipation;  unusual urinary symptoms; polydipsia, polyuria, hot or cold intolerance; headache; anxiety, depression, homicidal or suicidal thoughts.       Labs:                      WBC                      4.81                07/02/2021                 HGB                      12.3                07/02/2021                 HCT                      38.8                07/02/2021                 PLT                      322                 07/02/2021                 CHOL                     183                 07/02/2021                 TRIG                     66                  07/02/2021                 HDL                      60                  07/02/2021                 ALT                      14                  07/02/2021                 AST                      14                  07/02/2021                 NA                       141                 07/02/2021                 K                        3.7                 07/02/2021                 CL                       105                 07/02/2021                 CREATININE               0.8                 07/02/2021                 BUN                      8                   07/02/2021                 CO2                      24                  07/02/2021                 TSH                      0.988               07/02/2021                 INR                      4.1 (H)             10/07/2010                 HGBA1C                   5.9 (H)             07/02/2021            LDLCALC                  109.8               07/02/2021           Vit D, 25-Hydroxy            20           07/02/2021      Current Outpatient Medications   Medication Sig    ascorbic acid-elderberry fruit 100-50 mg Chew Take by mouth.    aspirin (ECOTRIN) 81 MG EC tablet Take 81 mg by mouth once daily.    benzonatate (TESSALON PERLES) 100 MG capsule Take 2 capsules (200 mg total) by mouth 3 (three) times daily as needed for Cough.    carvediloL (COREG) 6.25 MG tablet Take 6.25 mg  by mouth 2 (two) times daily with meals.    chlorhexidine (PERIDEX) 0.12 % solution RINSE MOUTH WITH 1/2 OZ FOR 30 SECONDS TWICE DAILY    ergocalciferol (ERGOCALCIFEROL) 50,000 unit Cap TAKE 1 CAPSULE BY MOUTH EVERY 7 DAYS    levocetirizine (XYZAL) 5 MG tablet TAKE 1 TABLET(5 MG) BY MOUTH EVERY NIGHT AS NEEDED    losartan (COZAAR) 50 MG tablet TAKE 1 TABLET(50 MG) BY MOUTH EVERY DAY    meloxicam (MOBIC) 15 MG tablet Take 15 mg by mouth once daily.    metFORMIN (GLUCOPHAGE-XR) 500 MG ER 24hr tablet TAKE 3 TABLETS BY MOUTH EVERY DAY    tiZANidine (ZANAFLEX) 4 MG tablet Take 4 mg by mouth nightly.    zinc sulfate (ZINCATE) 50 mg zinc (220 mg) capsule        Review of Systems   Constitutional: Positive for activity change. Negative for appetite change, chills, fatigue, fever and unexpected weight change.        Weight 148.9 kg (328 lb 4.2 oz) at June 29, 2021 visit. See history of present illness.   Respiratory: Negative for cough, shortness of breath and wheezing.    Cardiovascular: Negative for chest pain, palpitations and leg swelling.        See history of present illness.   Gastrointestinal: Negative for abdominal pain, constipation, diarrhea, nausea and vomiting.   Endocrine: Positive for polyphagia. Negative for cold intolerance, heat intolerance, polydipsia and polyuria.        See history of present illness.   Genitourinary: Negative for difficulty urinating and menstrual problem.   Musculoskeletal: Positive for arthralgias, back pain and joint swelling.        See history of present illness.   Neurological: Negative for headaches.   Hematological:        See history of present illness.   Psychiatric/Behavioral: Negative for dysphoric mood and suicidal ideas. The patient is not nervous/anxious.         Negative for homicidal ideas.  See history of present illness.       Objective:  Physical Exam  Vitals reviewed.   Constitutional:       General: She is not in acute distress.     Appearance: Normal  appearance. She is well-developed. She is obese. She is not ill-appearing, toxic-appearing or diaphoretic.      Comments: Obese and pleasant.   Neck:      Thyroid: No thyromegaly.   Cardiovascular:      Rate and Rhythm: Normal rate and regular rhythm.      Heart sounds: Normal heart sounds. No murmur heard.      Pulmonary:      Effort: Pulmonary effort is normal. No respiratory distress.      Breath sounds: Normal breath sounds. No wheezing.   Abdominal:      General: Bowel sounds are normal. There is no distension.      Palpations: Abdomen is soft. There is no mass.      Tenderness: There is no abdominal tenderness. There is no guarding or rebound.   Musculoskeletal:         General: No swelling or tenderness. Normal range of motion.      Cervical back: Normal range of motion and neck supple. No tenderness.      Comments: She is ambulatory without problems.   Lymphadenopathy:      Cervical: No cervical adenopathy.   Neurological:      General: No focal deficit present.      Mental Status: She is alert and oriented to person, place, and time.   Psychiatric:         Mood and Affect: Mood normal.         Behavior: Behavior normal.         Thought Content: Thought content normal.         Judgment: Judgment normal.         ASSESSMENT:  1. Hypertension, unspecified type    2. Prediabetes    3. Iron deficiency anemia, unspecified iron deficiency anemia type    4. Vitamin D deficiency    5. Arthritis of back    6. Arthritis of knee    7. Morbid obesity with BMI of 50.0-59.9, adult        PLAN:  Jenna was seen today for follow-up and hypertension.    Diagnoses and all orders for this visit:    Hypertension, unspecified type  -     Comprehensive Metabolic Panel; Future    Prediabetes  -     Hemoglobin A1C; Future  -     Comprehensive Metabolic Panel; Future    Iron deficiency anemia, unspecified iron deficiency anemia type    Vitamin D deficiency    Arthritis of back    Arthritis of knee    Morbid obesity with BMI of  50.0-59.9, adult       Patient advised to call for results.  Continue current medications, follow low sodium, low cholesterol, low carb diet, daily walks.  Mobility impairment form renewed and given to patient.  Work excuse for today with return tomorrow.  Keep follow up with specialists.  Follow up in about 6 months (around 6/29/2022) for physical.

## 2022-01-06 ENCOUNTER — TELEPHONE (OUTPATIENT)
Dept: FAMILY MEDICINE | Facility: CLINIC | Age: 52
End: 2022-01-06
Payer: COMMERCIAL

## 2022-01-06 ENCOUNTER — OFFICE VISIT (OUTPATIENT)
Dept: INTERNAL MEDICINE | Facility: CLINIC | Age: 52
End: 2022-01-06
Payer: COMMERCIAL

## 2022-01-06 VITALS
OXYGEN SATURATION: 99 % | DIASTOLIC BLOOD PRESSURE: 86 MMHG | WEIGHT: 293 LBS | HEIGHT: 65 IN | SYSTOLIC BLOOD PRESSURE: 136 MMHG | BODY MASS INDEX: 48.82 KG/M2 | HEART RATE: 78 BPM | TEMPERATURE: 98 F

## 2022-01-06 DIAGNOSIS — G58.8 INTERCOSTAL NEURALGIA: Primary | ICD-10-CM

## 2022-01-06 DIAGNOSIS — E66.01 MORBID OBESITY WITH BMI OF 50.0-59.9, ADULT: ICD-10-CM

## 2022-01-06 DIAGNOSIS — I10 PRIMARY HYPERTENSION: ICD-10-CM

## 2022-01-06 PROCEDURE — 1159F PR MEDICATION LIST DOCUMENTED IN MEDICAL RECORD: ICD-10-PCS | Mod: CPTII,S$GLB,, | Performed by: FAMILY MEDICINE

## 2022-01-06 PROCEDURE — 99213 PR OFFICE/OUTPT VISIT, EST, LEVL III, 20-29 MIN: ICD-10-PCS | Mod: S$GLB,,, | Performed by: FAMILY MEDICINE

## 2022-01-06 PROCEDURE — 3079F PR MOST RECENT DIASTOLIC BLOOD PRESSURE 80-89 MM HG: ICD-10-PCS | Mod: CPTII,S$GLB,, | Performed by: FAMILY MEDICINE

## 2022-01-06 PROCEDURE — 1159F MED LIST DOCD IN RCRD: CPT | Mod: CPTII,S$GLB,, | Performed by: FAMILY MEDICINE

## 2022-01-06 PROCEDURE — 99999 PR PBB SHADOW E&M-EST. PATIENT-LVL V: CPT | Mod: PBBFAC,,, | Performed by: FAMILY MEDICINE

## 2022-01-06 PROCEDURE — 3075F PR MOST RECENT SYSTOLIC BLOOD PRESS GE 130-139MM HG: ICD-10-PCS | Mod: CPTII,S$GLB,, | Performed by: FAMILY MEDICINE

## 2022-01-06 PROCEDURE — 3008F BODY MASS INDEX DOCD: CPT | Mod: CPTII,S$GLB,, | Performed by: FAMILY MEDICINE

## 2022-01-06 PROCEDURE — 99213 OFFICE O/P EST LOW 20 MIN: CPT | Mod: S$GLB,,, | Performed by: FAMILY MEDICINE

## 2022-01-06 PROCEDURE — 3079F DIAST BP 80-89 MM HG: CPT | Mod: CPTII,S$GLB,, | Performed by: FAMILY MEDICINE

## 2022-01-06 PROCEDURE — 3075F SYST BP GE 130 - 139MM HG: CPT | Mod: CPTII,S$GLB,, | Performed by: FAMILY MEDICINE

## 2022-01-06 PROCEDURE — 3008F PR BODY MASS INDEX (BMI) DOCUMENTED: ICD-10-PCS | Mod: CPTII,S$GLB,, | Performed by: FAMILY MEDICINE

## 2022-01-06 PROCEDURE — 99999 PR PBB SHADOW E&M-EST. PATIENT-LVL V: ICD-10-PCS | Mod: PBBFAC,,, | Performed by: FAMILY MEDICINE

## 2022-01-06 RX ORDER — SEMAGLUTIDE 1.34 MG/ML
INJECTION, SOLUTION SUBCUTANEOUS
COMMUNITY
Start: 2022-01-04 | End: 2022-05-17

## 2022-01-06 RX ORDER — PREDNISONE 10 MG/1
TABLET ORAL
Qty: 10 TABLET | Refills: 0 | Status: SHIPPED | OUTPATIENT
Start: 2022-01-06 | End: 2022-01-06 | Stop reason: SDUPTHER

## 2022-01-06 RX ORDER — PREDNISONE 10 MG/1
TABLET ORAL
Qty: 10 TABLET | Refills: 0 | Status: SHIPPED | OUTPATIENT
Start: 2022-01-06 | End: 2022-01-10

## 2022-01-06 NOTE — TELEPHONE ENCOUNTER
patient states she is having severe back pain. She would like to be working in today. Patient states she has tried tizanidine and mobic which she got from her orthopedics with no help.

## 2022-01-06 NOTE — PROGRESS NOTES
CHIEF COMPLAINT: Back Pain    She reports that she was working reaching overhead on Monday. Tuesday she started developing lower thoracic back pain that subsequently radiated to the left. She describes it as a shark and shooting and aching pain that travels long underneath one of her ribs. She says the pain was worse last night, and it hurt to sleep on that side. It is made worse by certain positioning. Meloxicam helps but provides unsatisfactory relief. She reports no fevers, cough, difficulty breathing, exertional chest discomfort, or other concerning symptoms. Physical exam is remarkable only for reproduction of pain on palpation of the inferior margin of the left rib. There is no pain allodynia or hyperesthesia or rash to suggest shingles. (Exam assisted by Darrel Jean MA, who was present throughout the entirety of this part of the physical exam.) We discussed differential diagnosis and risks and benefits of treatment options. It was agreed to proceed with empiric treatment as prescribed. I told her to expect improvement and gradual resolution of symptoms over the next few days and instructed her to let me know if this were not the case. I told her to not take meloxicam while she is taking prednisone.  Unless noted herein, any chronic conditions are represented as and appear compensated/controlled and stable, and no other significant complaints or concerns were reported.    DIAGNOSES SPECIFICALLY EVALUATED AND TREATED THIS ENCOUNTER  1. Intercostal neuralgia  - predniSONE (DELTASONE) 10 MG tablet; Take 4 tablets (40 mg total) by mouth once daily for 1 day, THEN 3 tablets (30 mg total) once daily for 1 day, THEN 2 tablets (20 mg total) once daily for 1 day, THEN 1 tablet (10 mg total) once daily for 1 day.  Dispense: 10 tablet; Refill: 0    2. Primary hypertension    3. Morbid obesity with BMI of 50.0-59.9, adult  - Ambulatory referral/consult to Weight Management Program; Future     Follow up if symptoms  "worsen or fail to improve.    Problem List Items Addressed This Visit     Hypertension    Current Assessment & Plan     BP Readings from Last 6 Encounters:   01/06/22 136/86   01/04/22 112/78   01/04/22 102/64   12/14/21 126/70   07/02/21 110/70   06/29/21 125/77            Morbid obesity with BMI of 50.0-59.9, adult    Current Assessment & Plan     Wt Readings from Last 6 Encounters:   01/06/22 (!) 155.6 kg (343 lb 0.6 oz)   01/04/22 (!) 155.1 kg (342 lb)   01/04/22 (!) 155 kg (341 lb 11.4 oz)   12/14/21 (!) 153.8 kg (339 lb)   07/02/21 (!) 148.5 kg (327 lb 6.1 oz)   06/29/21 (!) 148.9 kg (328 lb 4.2 oz)     Estimated body mass index is 57.08 kg/m² as calculated from the following:    Height as of this encounter: 5' 5" (1.651 m).    Weight as of this encounter: 155.6 kg (343 lb 0.6 oz).          Relevant Orders    Ambulatory referral/consult to Weight Management Program      Other Visit Diagnoses     Intercostal neuralgia    -  Primary    Relevant Medications    predniSONE (DELTASONE) 10 MG tablet          PRESCRIPTION DRUG MANAGEMENT  Outpatient Medications Prior to Visit   Medication Sig Dispense Refill    ascorbic acid-elderberry fruit 100-50 mg Chew Take by mouth.      aspirin (ECOTRIN) 81 MG EC tablet Take 81 mg by mouth once daily.      carvediloL (COREG) 6.25 MG tablet Take 6.25 mg by mouth 2 (two) times daily with meals.      chlorhexidine (PERIDEX) 0.12 % solution RINSE MOUTH WITH 1/2 OZ FOR 30 SECONDS TWICE DAILY      ergocalciferol (ERGOCALCIFEROL) 50,000 unit Cap TAKE 1 CAPSULE BY MOUTH EVERY 7 DAYS 4 capsule 5    levocetirizine (XYZAL) 5 MG tablet TAKE 1 TABLET(5 MG) BY MOUTH EVERY NIGHT AS NEEDED 30 tablet 5    losartan (COZAAR) 50 MG tablet TAKE 1 TABLET(50 MG) BY MOUTH EVERY DAY 90 tablet 1    meloxicam (MOBIC) 15 MG tablet Take 15 mg by mouth once daily.      metFORMIN (GLUCOPHAGE-XR) 500 MG ER 24hr tablet TAKE 3 TABLETS BY MOUTH EVERY DAY 90 tablet 5    OZEMPIC 0.25 mg or 0.5 mg(2 mg/1.5 " "mL) pen injector Inject into the skin.      tiZANidine (ZANAFLEX) 4 MG tablet Take 4 mg by mouth nightly.      zinc sulfate (ZINCATE) 50 mg zinc (220 mg) capsule        No facility-administered medications prior to visit.      Medications Discontinued During This Encounter   Medication Reason    predniSONE (DELTASONE) 10 MG tablet Reorder      Medications Ordered This Encounter   Medications    predniSONE (DELTASONE) 10 MG tablet     Sig: Take 4 tablets (40 mg total) by mouth once daily for 1 day, THEN 3 tablets (30 mg total) once daily for 1 day, THEN 2 tablets (20 mg total) once daily for 1 day, THEN 1 tablet (10 mg total) once daily for 1 day.     Dispense:  10 tablet     Refill:  0       PHYSICAL EXAM  Vitals:    01/06/22 1328 01/06/22 1335   BP: (!) 142/86 136/86   BP Location: Left arm Right arm   Patient Position: Sitting Sitting   BP Method: X-Large (Manual) X-Large (Manual)   Pulse: 78    Temp: 97.6 °F (36.4 °C)    TempSrc: Tympanic    SpO2: 99%    Weight: (!) 155.6 kg (343 lb 0.6 oz)    Height: 5' 5" (1.651 m)    CONSTITUTIONAL: Vital signs noted. No apparent distress. Does not appear acutely ill or septic. Appears adequately hydrated.  CHEST: Description of relevant exam of this system is documented above in HPI.  PULM: Lungs clear. Breathing unlabored.  HEART: Auscultation reveals regular rate.  DERM: Skin warm and moist with normal turgor.  NEURO: There are no gross focal motor deficits.  PSYCHIATRIC: Alert and conversant. Mood grossly neutral. Judgment and insight grossly intact.    TOTAL TIME evaluating and managing this patient for this encounter was greater than or equal to 29 minutes. This time was spent personally by me on the following activities: review of nurse's notes, pre-charting, obtaining history from the patient, examination of the patient, managing and/or ordering prescription medications, medication counseling, communication with patient about patient's condition and treatment plan, " "discussing "red flag" signs/symptoms that warrant urgent medical attention, discussing planned follow-up and final documentation of the visit. This time was exclusive of any separately billable procedures for this patient and exclusive of time spent treating any other patients.    Documentation entered by me for this encounter may have been done in part using speech-recognition technology. Although I have made an effort to ensure accuracy, "sound like" errors may exist and should be interpreted in context. -JUANJO Osmna MD.     "

## 2022-01-06 NOTE — TELEPHONE ENCOUNTER
----- Message from Rosamaria Marie sent at 1/6/2022  8:11 AM CST -----  Contact: Jenna Garzaa called regarding her back pain has gotten worse and would like to speak with someone today, please give her a call back at [phone] (home)     Thanks  Kb

## 2022-01-06 NOTE — TELEPHONE ENCOUNTER
Pt states she is resting and is currently taking her steroids medication. She will call back if symptoms does not improve.

## 2022-01-06 NOTE — ASSESSMENT & PLAN NOTE
BP Readings from Last 6 Encounters:   01/06/22 136/86   01/04/22 112/78   01/04/22 102/64   12/14/21 126/70   07/02/21 110/70   06/29/21 125/77

## 2022-01-06 NOTE — PATIENT INSTRUCTIONS
I've entered a referral order for you to be able to participate in [facility]'s excellent [facility]. Call [phone] to schedule your appointment. You can learn more about the program at [email or web address]

## 2022-01-06 NOTE — ASSESSMENT & PLAN NOTE
"Wt Readings from Last 6 Encounters:   01/06/22 (!) 155.6 kg (343 lb 0.6 oz)   01/04/22 (!) 155.1 kg (342 lb)   01/04/22 (!) 155 kg (341 lb 11.4 oz)   12/14/21 (!) 153.8 kg (339 lb)   07/02/21 (!) 148.5 kg (327 lb 6.1 oz)   06/29/21 (!) 148.9 kg (328 lb 4.2 oz)     Estimated body mass index is 57.08 kg/m² as calculated from the following:    Height as of this encounter: 5' 5" (1.651 m).    Weight as of this encounter: 155.6 kg (343 lb 0.6 oz).   "

## 2022-01-10 ENCOUNTER — HOSPITAL ENCOUNTER (OUTPATIENT)
Dept: RADIOLOGY | Facility: HOSPITAL | Age: 52
Discharge: HOME | End: 2022-01-10
Attending: FAMILY MEDICINE
Payer: COMMERCIAL

## 2022-01-10 ENCOUNTER — TELEPHONE (OUTPATIENT)
Dept: FAMILY MEDICINE | Facility: CLINIC | Age: 52
End: 2022-01-10
Payer: COMMERCIAL

## 2022-01-10 ENCOUNTER — OFFICE VISIT (OUTPATIENT)
Dept: INTERNAL MEDICINE | Facility: CLINIC | Age: 52
End: 2022-01-10
Payer: COMMERCIAL

## 2022-01-10 VITALS
BODY MASS INDEX: 56.61 KG/M2 | WEIGHT: 293 LBS | DIASTOLIC BLOOD PRESSURE: 82 MMHG | OXYGEN SATURATION: 97 % | TEMPERATURE: 98 F | SYSTOLIC BLOOD PRESSURE: 124 MMHG | HEART RATE: 66 BPM

## 2022-01-10 DIAGNOSIS — G58.8 INTERCOSTAL NEURALGIA: ICD-10-CM

## 2022-01-10 DIAGNOSIS — R07.89 NON-CARDIAC CHEST PAIN: ICD-10-CM

## 2022-01-10 DIAGNOSIS — R07.89 NON-CARDIAC CHEST PAIN: Primary | ICD-10-CM

## 2022-01-10 PROBLEM — M54.14 THORACIC RADICULITIS: Status: ACTIVE | Noted: 2022-01-10

## 2022-01-10 PROCEDURE — 3079F PR MOST RECENT DIASTOLIC BLOOD PRESSURE 80-89 MM HG: ICD-10-PCS | Mod: CPTII,S$GLB,, | Performed by: FAMILY MEDICINE

## 2022-01-10 PROCEDURE — 71046 XR CHEST PA AND LATERAL: ICD-10-PCS | Mod: 26,,, | Performed by: RADIOLOGY

## 2022-01-10 PROCEDURE — 71046 X-RAY EXAM CHEST 2 VIEWS: CPT | Mod: 26,,, | Performed by: RADIOLOGY

## 2022-01-10 PROCEDURE — 3074F PR MOST RECENT SYSTOLIC BLOOD PRESSURE < 130 MM HG: ICD-10-PCS | Mod: CPTII,S$GLB,, | Performed by: FAMILY MEDICINE

## 2022-01-10 PROCEDURE — 71100 XR RIBS 2 VIEW LEFT: ICD-10-PCS | Mod: 26,LT,, | Performed by: RADIOLOGY

## 2022-01-10 PROCEDURE — 71100 X-RAY EXAM RIBS UNI 2 VIEWS: CPT | Mod: TC,LT

## 2022-01-10 PROCEDURE — 99214 OFFICE O/P EST MOD 30 MIN: CPT | Mod: S$GLB,,, | Performed by: FAMILY MEDICINE

## 2022-01-10 PROCEDURE — 99214 PR OFFICE/OUTPT VISIT, EST, LEVL IV, 30-39 MIN: ICD-10-PCS | Mod: S$GLB,,, | Performed by: FAMILY MEDICINE

## 2022-01-10 PROCEDURE — 71100 X-RAY EXAM RIBS UNI 2 VIEWS: CPT | Mod: 26,LT,, | Performed by: RADIOLOGY

## 2022-01-10 PROCEDURE — 1160F RVW MEDS BY RX/DR IN RCRD: CPT | Mod: CPTII,S$GLB,, | Performed by: FAMILY MEDICINE

## 2022-01-10 PROCEDURE — 3074F SYST BP LT 130 MM HG: CPT | Mod: CPTII,S$GLB,, | Performed by: FAMILY MEDICINE

## 2022-01-10 PROCEDURE — 71046 X-RAY EXAM CHEST 2 VIEWS: CPT | Mod: TC

## 2022-01-10 PROCEDURE — 1160F PR REVIEW ALL MEDS BY PRESCRIBER/CLIN PHARMACIST DOCUMENTED: ICD-10-PCS | Mod: CPTII,S$GLB,, | Performed by: FAMILY MEDICINE

## 2022-01-10 PROCEDURE — 99999 PR PBB SHADOW E&M-EST. PATIENT-LVL V: CPT | Mod: PBBFAC,,, | Performed by: FAMILY MEDICINE

## 2022-01-10 PROCEDURE — 1159F PR MEDICATION LIST DOCUMENTED IN MEDICAL RECORD: ICD-10-PCS | Mod: CPTII,S$GLB,, | Performed by: FAMILY MEDICINE

## 2022-01-10 PROCEDURE — 1159F MED LIST DOCD IN RCRD: CPT | Mod: CPTII,S$GLB,, | Performed by: FAMILY MEDICINE

## 2022-01-10 PROCEDURE — 3008F BODY MASS INDEX DOCD: CPT | Mod: CPTII,S$GLB,, | Performed by: FAMILY MEDICINE

## 2022-01-10 PROCEDURE — 3008F PR BODY MASS INDEX (BMI) DOCUMENTED: ICD-10-PCS | Mod: CPTII,S$GLB,, | Performed by: FAMILY MEDICINE

## 2022-01-10 PROCEDURE — 3079F DIAST BP 80-89 MM HG: CPT | Mod: CPTII,S$GLB,, | Performed by: FAMILY MEDICINE

## 2022-01-10 PROCEDURE — 99999 PR PBB SHADOW E&M-EST. PATIENT-LVL V: ICD-10-PCS | Mod: PBBFAC,,, | Performed by: FAMILY MEDICINE

## 2022-01-10 RX ORDER — TRAMADOL HYDROCHLORIDE 50 MG/1
50 TABLET ORAL EVERY 8 HOURS PRN
Qty: 20 TABLET | Refills: 0 | Status: SHIPPED | OUTPATIENT
Start: 2022-01-10 | End: 2022-01-16

## 2022-01-10 NOTE — TELEPHONE ENCOUNTER
----- Message from Padma Barroso sent at 1/10/2022 11:09 AM CST -----  Type:  Patient Call Back    Who Called: Jenna     What is the reqeust in detail: Pt is requesting a call  back in regard to a f/u base on her treatments from other [facility] providers. Please Advice    Can the clinic reply by [facility]?    Best Call Back [phone]

## 2022-01-10 NOTE — PROGRESS NOTES
CHIEF COMPLAINT: Follow-up    She returns and reports essentially the exact same symptoms as she did at a previous appointment. She says that the first day of steroids perhaps helped some, but she has gotten no better since then. She says that she is no better and maybe perhaps a little worse. Same characteristics. Pain is worst on the left lateral aspect of her chest/ribs. She still has reproduction of pain on palpation in the same intercostal pattern. No pain with respiration. We discussed differential diagnosis and risks and benefits of treatment options. It was agreed to proceed with evaluation, treatments, and referrals as ordered.    DIAGNOSES SPECIFICALLY EVALUATED AND TREATED THIS ENCOUNTER  1. Non-cardiac lateral left chest pain  - X-Ray Ribs 2 View Left; Future  - traMADoL (ULTRAM) 50 mg tablet; Take 1 tablet (50 mg total) by mouth every 8 (eight) hours as needed for Pain.  Dispense: 20 tablet; Refill: 0  - X-Ray Chest PA And Lateral; Future  - Ambulatory referral/consult to Physical/Occupational Therapy; Future  - Ambulatory referral/consult to Pain Clinic; Future    2. Intercostal neuralgia  - traMADoL (ULTRAM) 50 mg tablet; Take 1 tablet (50 mg total) by mouth every 8 (eight) hours as needed for Pain.  Dispense: 20 tablet; Refill: 0  - Ambulatory referral/consult to Physical/Occupational Therapy; Future  - Ambulatory referral/consult to Pain Clinic; Future     Follow up if symptoms worsen or fail to improve.    Problem List Items Addressed This Visit     Thoracic radiculitis - Primary    Relevant Medications    traMADoL (ULTRAM) 50 mg tablet      Other Visit Diagnoses     Intercostal neuralgia        Relevant Medications    traMADoL (ULTRAM) 50 mg tablet    Other Relevant Orders    Ambulatory referral/consult to Physical/Occupational Therapy    Ambulatory referral/consult to Pain Clinic      Unless noted herein, any chronic conditions are represented as and appear compensated/controlled and stable, and  no other significant complaints or concerns were reported.    PRESCRIPTION DRUG MANAGEMENT  Outpatient Medications Prior to Visit   Medication Sig Dispense Refill    ascorbic acid-elderberry fruit 100-50 mg Chew Take by mouth.      aspirin (ECOTRIN) 81 MG EC tablet Take 81 mg by mouth once daily.      carvediloL (COREG) 6.25 MG tablet Take 6.25 mg by mouth 2 (two) times daily with meals.      chlorhexidine (PERIDEX) 0.12 % solution RINSE MOUTH WITH 1/2 OZ FOR 30 SECONDS TWICE DAILY      ergocalciferol (ERGOCALCIFEROL) 50,000 unit Cap TAKE 1 CAPSULE BY MOUTH EVERY 7 DAYS 4 capsule 5    levocetirizine (XYZAL) 5 MG tablet TAKE 1 TABLET(5 MG) BY MOUTH EVERY NIGHT AS NEEDED 30 tablet 5    losartan (COZAAR) 50 MG tablet TAKE 1 TABLET(50 MG) BY MOUTH EVERY DAY 90 tablet 1    meloxicam (MOBIC) 15 MG tablet Take 15 mg by mouth once daily.      metFORMIN (GLUCOPHAGE-XR) 500 MG ER 24hr tablet TAKE 3 TABLETS BY MOUTH EVERY DAY 90 tablet 5    OZEMPIC 0.25 mg or 0.5 mg(2 mg/1.5 mL) pen injector Inject into the skin.      tiZANidine (ZANAFLEX) 4 MG tablet Take 4 mg by mouth nightly.      zinc sulfate (ZINCATE) 50 mg zinc (220 mg) capsule       predniSONE (DELTASONE) 10 MG tablet Take 4 tablets (40 mg total) by mouth once daily for 1 day, THEN 3 tablets (30 mg total) once daily for 1 day, THEN 2 tablets (20 mg total) once daily for 1 day, THEN 1 tablet (10 mg total) once daily for 1 day. 10 tablet 0     No facility-administered medications prior to visit.     Medications Discontinued During This Encounter   Medication Reason    predniSONE (DELTASONE) 10 MG tablet Patient no longer taking     Medications Ordered This Encounter   Medications    traMADoL (ULTRAM) 50 mg tablet     Sig: Take 1 tablet (50 mg total) by mouth every 8 (eight) hours as needed for Pain.     Dispense:  20 tablet     Refill:  0     GREATER THAN 7-DAY SUPPLY IS MEDICALLY NECESSARY.     Order Specific Question:   I have reviewed the Prescription  Drug Monitoring Program (PDMP) database for this patient prior to prescribing the above opioid medication     Answer:   Yes      Review of Systems   Constitutional: Negative for fever.   Respiratory: Negative for chest tightness and shortness of breath.    Cardiovascular: Positive for chest pain.   Gastrointestinal: Negative for abdominal distention, blood in stool, change in bowel habit, diarrhea, vomiting and change in bowel habit.   Genitourinary: Negative for bladder incontinence, dysuria, hematuria and pelvic pain.   Musculoskeletal: Positive for back pain (thoracic radiating). Negative for gait problem and leg pain.   Neurological: Negative for weakness, numbness and headaches.      PHYSICAL EXAM  Vitals:    01/10/22 0841   BP: 124/82   BP Location: Left arm   Patient Position: Sitting   BP Method: Large (Manual)   Pulse: 66   Temp: 97.5 °F (36.4 °C)   TempSrc: Tympanic   SpO2: 97%   Weight: (!) 154.3 kg (340 lb 2.7 oz)   Physical Exam  Vitals reviewed.   Constitutional:       General: She is not in acute distress.     Appearance: Normal appearance. She is not ill-appearing or diaphoretic.   Cardiovascular:      Rate and Rhythm: Normal rate and regular rhythm.   Pulmonary:      Effort: Pulmonary effort is normal.      Breath sounds: Normal breath sounds.   Chest:      Comments: Symmetric. Description of relevant exam of this system is documented above in HPI.  Abdominal:      Palpations: Abdomen is soft.      Tenderness: There is no abdominal tenderness.   Skin:     General: Skin is warm and dry.   Neurological:      Mental Status: She is alert and oriented to person, place, and time. Mental status is at baseline.   Psychiatric:         Mood and Affect: Mood normal.         Behavior: Behavior normal.         Judgment: Judgment normal.     TOTAL TIME evaluating and managing this patient for this encounter was greater than or equal to 30 minutes. This time was spent personally by me on the following activities:  "review of nurse's notes, pre-charting, review of any interval history, review of [place] Board of Pharmacy Controlled Prescription Drug Monitoring database records for the patient, review and interpretation of imaging test results, obtaining history from the patient, evaluation of the patient's response to treatment, examination of the patient, medication reconciliation, managing and/or ordering prescription medications, medication counseling, ordering other treatments, ordering diagnostic tests, ordering referral to subspecialty provider(s), patient-education regarding diagnosis, treatment plan, and goals of treatment, discussing planned follow-up, composition of work-excuse letter for patient and final documentation of the visit. This time was exclusive of any separately billable procedures for this patient and exclusive of time spent treating any other patients.   Documentation entered by me for this encounter may have been done in part using speech-recognition technology. Although I have made an effort to ensure accuracy, "sound like" errors may exist and should be interpreted in context.   "

## 2022-01-10 NOTE — PATIENT INSTRUCTIONS
01/10/2022        TO WHOM IT MAY CONCERN:     RE:  Jenna STEWART Vasquez ,  1970     Jenna was treated by me on  and 1/10/22.    She is presently unable to return to work. She has been referred to a specialist for further evaluation. The specialist will re-assess her work status then.    If she feels better sooner, she may return to work at her discretion.    Please extend to Jenna all due courtesy and consideration and excuse her absence.    Sincerely,     JUANJO Osman MD

## 2022-01-11 ENCOUNTER — PATIENT MESSAGE (OUTPATIENT)
Dept: PAIN MEDICINE | Facility: CLINIC | Age: 52
End: 2022-01-11
Payer: COMMERCIAL

## 2022-01-11 ENCOUNTER — TELEPHONE (OUTPATIENT)
Dept: PAIN MEDICINE | Facility: CLINIC | Age: 52
End: 2022-01-11
Payer: COMMERCIAL

## 2022-01-11 ENCOUNTER — OFFICE VISIT (OUTPATIENT)
Dept: FAMILY MEDICINE | Facility: CLINIC | Age: 52
End: 2022-01-11
Payer: COMMERCIAL

## 2022-01-11 VITALS
OXYGEN SATURATION: 99 % | SYSTOLIC BLOOD PRESSURE: 130 MMHG | HEIGHT: 65 IN | BODY MASS INDEX: 48.82 KG/M2 | TEMPERATURE: 97 F | DIASTOLIC BLOOD PRESSURE: 74 MMHG | WEIGHT: 293 LBS | HEART RATE: 71 BPM

## 2022-01-11 DIAGNOSIS — R07.89 CHEST PAIN, ATYPICAL: Primary | ICD-10-CM

## 2022-01-11 PROCEDURE — 93005 ELECTROCARDIOGRAM TRACING: CPT | Mod: S$GLB,,, | Performed by: FAMILY MEDICINE

## 2022-01-11 PROCEDURE — 99999 PR PBB SHADOW E&M-EST. PATIENT-LVL V: CPT | Mod: PBBFAC,,, | Performed by: FAMILY MEDICINE

## 2022-01-11 PROCEDURE — 93010 ELECTROCARDIOGRAM REPORT: CPT | Mod: S$GLB,,, | Performed by: INTERNAL MEDICINE

## 2022-01-11 PROCEDURE — 3075F PR MOST RECENT SYSTOLIC BLOOD PRESS GE 130-139MM HG: ICD-10-PCS | Mod: CPTII,S$GLB,, | Performed by: FAMILY MEDICINE

## 2022-01-11 PROCEDURE — 3075F SYST BP GE 130 - 139MM HG: CPT | Mod: CPTII,S$GLB,, | Performed by: FAMILY MEDICINE

## 2022-01-11 PROCEDURE — 1159F MED LIST DOCD IN RCRD: CPT | Mod: CPTII,S$GLB,, | Performed by: FAMILY MEDICINE

## 2022-01-11 PROCEDURE — 1159F PR MEDICATION LIST DOCUMENTED IN MEDICAL RECORD: ICD-10-PCS | Mod: CPTII,S$GLB,, | Performed by: FAMILY MEDICINE

## 2022-01-11 PROCEDURE — 3008F BODY MASS INDEX DOCD: CPT | Mod: CPTII,S$GLB,, | Performed by: FAMILY MEDICINE

## 2022-01-11 PROCEDURE — 93005 PR ELECTROCARDIOGRAM, TRACING: ICD-10-PCS | Mod: S$GLB,,, | Performed by: FAMILY MEDICINE

## 2022-01-11 PROCEDURE — 3008F PR BODY MASS INDEX (BMI) DOCUMENTED: ICD-10-PCS | Mod: CPTII,S$GLB,, | Performed by: FAMILY MEDICINE

## 2022-01-11 PROCEDURE — 3078F DIAST BP <80 MM HG: CPT | Mod: CPTII,S$GLB,, | Performed by: FAMILY MEDICINE

## 2022-01-11 PROCEDURE — 93010 EKG 12-LEAD: ICD-10-PCS | Mod: S$GLB,,, | Performed by: INTERNAL MEDICINE

## 2022-01-11 PROCEDURE — 99214 PR OFFICE/OUTPT VISIT, EST, LEVL IV, 30-39 MIN: ICD-10-PCS | Mod: 25,S$GLB,, | Performed by: FAMILY MEDICINE

## 2022-01-11 PROCEDURE — 3078F PR MOST RECENT DIASTOLIC BLOOD PRESSURE < 80 MM HG: ICD-10-PCS | Mod: CPTII,S$GLB,, | Performed by: FAMILY MEDICINE

## 2022-01-11 PROCEDURE — 99999 PR PBB SHADOW E&M-EST. PATIENT-LVL V: ICD-10-PCS | Mod: PBBFAC,,, | Performed by: FAMILY MEDICINE

## 2022-01-11 PROCEDURE — 1160F RVW MEDS BY RX/DR IN RCRD: CPT | Mod: CPTII,S$GLB,, | Performed by: FAMILY MEDICINE

## 2022-01-11 PROCEDURE — 1160F PR REVIEW ALL MEDS BY PRESCRIBER/CLIN PHARMACIST DOCUMENTED: ICD-10-PCS | Mod: CPTII,S$GLB,, | Performed by: FAMILY MEDICINE

## 2022-01-11 PROCEDURE — 99214 OFFICE O/P EST MOD 30 MIN: CPT | Mod: 25,S$GLB,, | Performed by: FAMILY MEDICINE

## 2022-01-11 NOTE — TELEPHONE ENCOUNTER
----- Message from Kandice Lucia sent at 1/11/2022  2:14 PM CST -----  Patient would like a call back at [phone]  in regards to confirming that the Dr will be in office 1/17/2022 before she reschedules her appointment.    Thanks

## 2022-01-11 NOTE — PROGRESS NOTES
Jenna SALAZAR PAT Ovalle    Chief Complaint   Patient presents with    Follow-up     Pt c/o Back and side pain    Chest Pain       History of Present Illness:   Ms. Ovalle comes in today for back pain follow-up.  She reports having back pain which radiates to the left more than right side.  She saw Dr. Tanvir Osman on January 6, 2022 and was treated with oral steroids for intercostal neuralgia.  She saw him again on January 10, 2022 for intercostal neuralgia and chest pain with chest x-ray, rib x-rays and was treated with tramadol 50 mg every 8 hours prn pain and referred to physical therapy (scheduled for January 12, 2022) and pain management scheduled for January 17, 2022).  She states she has taken tramadol yesterday x2 with help with pain.    She states she continues to have intermittent anterior chest pain since yesterday and especially with talking.  She reports having chest pain during visit with me today which lasted only briefly and resolved.  She also reports having abdominal pain for 2 days without pain noted today.    Otherwise, she denies having fever, chills, fatigue, appetite changes; shortness of breath, cough, wheezing; palpitations, leg swelling, diaphoresis; abdominal pain, nausea, vomiting, diarrhea, constipation; unusual urinary symptoms; polydipsia, polyphagia, polyuria, hot or cold intolerance; headache; anxiety, depression, homicidal or suicidal thoughts.      She states she follows with cardiologist Dr. Abhi Marks for surveillance of hypertension, cardiomegaly with last visit on December 8, 2021 with 6-month follow-up advised.  She states slight decrease of heart rate has been noted before.    EKG ordered and interpreted by me today - vent rate 49 bpm, sinus bradycardia, moderate voltage criteria for LVH, maybe normal variant, borderline ECG.    Back Pain  This is a recurrent problem. The current episode started 1 to 4 weeks ago. The problem occurs daily. The problem has been waxing  and waning since onset. The pain is present in the costovertebral angle. The quality of the pain is described as burning. The pain is at a severity of 8/10. The pain is severe. The pain is the same all the time. The symptoms are aggravated by lying down, sitting and stress. Stiffness is present all day. Associated symptoms include abdominal pain and chest pain. Pertinent negatives include no bladder incontinence, bowel incontinence, dysuria, fever, headaches, leg pain, numbness, paresis, paresthesias, pelvic pain, perianal numbness, weakness or weight loss. Risk factors include lack of exercise, menopause, obesity and poor posture. The treatment provided mild relief.       Current Outpatient Medications   Medication Sig    ascorbic acid-elderberry fruit 100-50 mg Chew Take by mouth.    aspirin (ECOTRIN) 81 MG EC tablet Take 81 mg by mouth once daily.    carvediloL (COREG) 6.25 MG tablet Take 6.25 mg by mouth 2 (two) times daily with meals.    chlorhexidine (PERIDEX) 0.12 % solution RINSE MOUTH WITH 1/2 OZ FOR 30 SECONDS TWICE DAILY    ergocalciferol (ERGOCALCIFEROL) 50,000 unit Cap TAKE 1 CAPSULE BY MOUTH EVERY 7 DAYS    levocetirizine (XYZAL) 5 MG tablet TAKE 1 TABLET(5 MG) BY MOUTH EVERY NIGHT AS NEEDED    losartan (COZAAR) 50 MG tablet TAKE 1 TABLET(50 MG) BY MOUTH EVERY DAY    meloxicam (MOBIC) 15 MG tablet Take 15 mg by mouth once daily.    metFORMIN (GLUCOPHAGE-XR) 500 MG ER 24hr tablet TAKE 3 TABLETS BY MOUTH EVERY DAY    OZEMPIC 0.25 mg or 0.5 mg(2 mg/1.5 mL) pen injector Inject into the skin.    tiZANidine (ZANAFLEX) 4 MG tablet Take 4 mg by mouth nightly.    traMADoL (ULTRAM) 50 mg tablet Take 1 tablet (50 mg total) by mouth every 8 (eight) hours as needed for Pain.    zinc sulfate (ZINCATE) 50 mg zinc (220 mg) capsule        Review of Systems   Constitutional: Negative for appetite change, chills, diaphoresis, fatigue, fever and weight loss.   Respiratory: Negative for cough, shortness of  breath and wheezing.    Cardiovascular: Positive for chest pain. Negative for palpitations and leg swelling.   Gastrointestinal: Positive for abdominal pain. Negative for bowel incontinence, constipation, diarrhea, nausea and vomiting.   Genitourinary: Negative for bladder incontinence, difficulty urinating, dysuria and pelvic pain.   Musculoskeletal: Positive for back pain.   Neurological: Negative for weakness, numbness, headaches and paresthesias.   Psychiatric/Behavioral: Negative for dysphoric mood and suicidal ideas. The patient is not nervous/anxious.         Negative for homicidal ideas.       Objective:  Physical Exam  Vitals reviewed.   Constitutional:       General: She is not in acute distress.     Appearance: Normal appearance. She is well-developed. She is obese. She is not ill-appearing, toxic-appearing or diaphoretic.      Comments: Obese and pleasant.   Neck:      Thyroid: No thyromegaly.   Cardiovascular:      Rate and Rhythm: Normal rate and regular rhythm.      Heart sounds: Normal heart sounds. No murmur heard.      Pulmonary:      Effort: Pulmonary effort is normal. No respiratory distress.      Breath sounds: Normal breath sounds. No wheezing.      Comments: Anterior chest wall and anterior rib tenderness noted.  Chest:      Chest wall: Tenderness present.   Abdominal:      General: Bowel sounds are normal. There is no distension.      Palpations: Abdomen is soft. There is no mass.      Tenderness: There is no abdominal tenderness. There is no guarding or rebound.   Musculoskeletal:         General: No swelling or tenderness. Normal range of motion.      Cervical back: Normal range of motion and neck supple. No tenderness.      Comments: She is ambulatory without problems.   Lymphadenopathy:      Cervical: No cervical adenopathy.   Neurological:      General: No focal deficit present.      Mental Status: She is alert and oriented to person, place, and time.   Psychiatric:         Mood and  Affect: Mood normal.         Behavior: Behavior normal.         Thought Content: Thought content normal.         Judgment: Judgment normal.         ASSESSMENT:  1. Chest pain, atypical        PLAN:  Jenna was seen today for follow-up and chest pain.    Diagnoses and all orders for this visit:    Chest pain, atypical  -     IN OFFICE EKG 12-LEAD (to [place])       Reassurance discussed with patient as pains are likely musculoskeletal in nature and to keep follow-up with specialist (physical therapist and pain management specialist) along with continue prescribed pain medications.  However, I advised patient to continue to monitor heart rate and notify her cardiologist if decrease of heart rate continues.  She verbalized understanding.  Continue current medications, follow low sodium, low cholesterol, low carb diet, daily walks.  Keep follow up with specialists.  Follow up if symptoms worsen or fail to improve.

## 2022-01-11 NOTE — TELEPHONE ENCOUNTER
Tried to call. No answer. Left   Alexis Boyd, MA [facility]   [facility]

## 2022-01-12 ENCOUNTER — CLINICAL SUPPORT (OUTPATIENT)
Dept: REHABILITATION | Facility: HOSPITAL | Age: 52
End: 2022-01-12
Attending: FAMILY MEDICINE
Payer: COMMERCIAL

## 2022-01-12 DIAGNOSIS — G58.8 INTERCOSTAL NEURALGIA: ICD-10-CM

## 2022-01-12 DIAGNOSIS — R07.89 NON-CARDIAC CHEST PAIN: ICD-10-CM

## 2022-01-12 DIAGNOSIS — M54.6 ACUTE BILATERAL THORACIC BACK PAIN: ICD-10-CM

## 2022-01-12 PROCEDURE — 97140 MANUAL THERAPY 1/> REGIONS: CPT

## 2022-01-12 PROCEDURE — 97161 PT EVAL LOW COMPLEX 20 MIN: CPT

## 2022-01-12 NOTE — PROGRESS NOTES
"Hi, Jenna.    I'm happy to report that these test results are NORMAL.    Thanks for letting me care for you, thanks for trusting us with your healthcare needs, and thanks for using [facility].    All the best,    JUANJO Osman MD  P.S. Want to learn more about your test results and what they mean? It's as simple as 1, 2, 3.     (1) Log in to your [facility] account at [email or web address]     (2) From the "Test Results" tab, click on the test you want to know more about.     (3) Click on the "About This Test" link. "

## 2022-01-12 NOTE — PROGRESS NOTES
"Hi, Jenna.    I'm happy to report that these test results are NORMAL.    Thanks for letting me care for you, thanks for trusting us with your healthcare needs, and thanks for using [facility].    All the best,    JUANJO Osman MD  P.S. Want to learn more about your test results and what they mean? It's as simple as 1, 2, 3.     (1) Log in to your [facility] account at [email or web address]     (2) From the "Test Results" tab, click on the test you want to know more about.     (3) Click on the "About This Test" link. "

## 2022-01-12 NOTE — PROGRESS NOTES
[facility]  Physical Therapy Initial Evaluation    Name: Jenna STEWART [facility] Number: [id number]    Therapy Diagnosis:   Encounter Diagnoses   Name Primary?    Non-cardiac lateral left chest pain     Intercostal neuralgia      Physician: JUANJO Osman MD    Physician Orders: PT Eval and Treat   Medical Diagnosis from Referral:   R07.89 (ICD-10-CM) - Non-cardiac chest pain   G58.8 (ICD-10-CM) - Intercostal neuralgia       Evaluation Date: 1/12/2022  Authorization Period Expiration: 1/19/23  Plan of Care Expiration: 4/12/22  Visit # / Visits authorized: 1/ 20    Time In: 11:55  Time Out: 12:35  Total Billable Time: 9 minutes    Precautions: Standard, Vitamin D deficiency    Subjective   Date of onset: end of 12/21  History of current condition - Jenna reports: mild pain began post. L rib region and wrapped around to anterior L chest region.  Pt. Reports taking prescribed medication which decreased pain but pain still lingering.  Pt. Reports history of this pain 2-3 times a year which would go away.  Pt. Reports attributed pain to having large breasts.         Pain:  Current 7/10, worst 10/10, best 7/10   Location: L thoracic region  Description: burning, sharp  Aggravating Factors: sitting and lying down  Easing Factors: Prescription meds, heating pad, ice    Prior Therapy: yes for knee and plantar fasciitis  Social History: NA  Occupation: Supervisor, mostly sitting at desk  Prior Level of Function: Independent with pain  Current Level of Function:     Imaging:  Chest X-Ray 1/10/22  FINDINGS:  Lungs are clear without focal consolidation, edema, or mass.  There is no pneumothorax or pleural effusion.  Mild cardiomegaly.  No acute osseous abnormality identified.  Specifically, no evidence of left rib fracture.  Surgical clips overlie the upper abdomen.     Impression:     As above.       Rib X-Ray 1/10/22  FINDINGS:  Lungs are clear without focal consolidation, edema, or  mass.  There is no pneumothorax or pleural effusion.  Mild cardiomegaly.  No acute osseous abnormality identified.  Specifically, no evidence of left rib fracture.  Surgical clips overlie the upper abdomen.     Impression:     As above.  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    Medications:   Jenna has a current medication list which includes the following prescription(s): ascorbic acid-elderberry fruit, aspirin, carvedilol, chlorhexidine, ergocalciferol, levocetirizine, losartan, meloxicam, metformin, ozempic, tizanidine, tramadol, and zinc sulfate.    Allergies:   Review of patient's allergies indicates:   Allergen Reactions    No known drug allergies         Pts goals: decrease thoracic pain to sleep and work comfortably    Objective       CMS Impairment/Limitation/Restriction for FOTO Ribs- Trunk Survey    Therapist reviewed FOTO scores for Jenna Ovalle on 1/12/2022.   FOTO documents entered into [employer] - see Media section.    Limitation Score: 59%       Posture: Pt noted to present with forward head/rounded shoulder posture.    Thoracic Spine AROM:   % Pain   FB WNL    BB WNL Pain about T4-5 region   RSB WNL Stretch on L, sensitivity of R side   LSB WNL Pain T7   RR  Pain T7   LR WNL Pain middle of back            Tenderness to palpation:  Patient tender to palpate along T7-9 levels with P-A mob and B unilateral PA mobs  T7-9         TREATMENT   Treatment Time In: 11:55  Treatment Time Out: 12:35  Total Treatment time separate from Evaluation: 16 minutes    Jenna received therapeutic exercises to develop strength and flexibility for 7 minutes including: hand heel rocks x 1 min., cat camel mobility x 1 min., B SL open books x 2 min., scap retraction x BTB x 1 min., lat pulls BTB x 1 min.,  pulls  BTB x 1 min.      Jenna received the following manual therapy techniques: Joint mobilizations were applied to the:  for 9 minutes, including: P-A mob T7-9 and B unilateral P-A mob T7-T9       Home Exercises and Patient Education Provided    Education provided:   -Education on condition, HEP, and activity    Written Home Exercises Provided: yes.  Exercises were reviewed and Jenna was able to demonstrate them prior to the end of the session.  Jenna demonstrated good  understanding of the education provided.     See EMR under Patient Instructions for exercises provided 1/12/2022.    Assessment   Jenna is a 51 y.o. female referred to outpatient Physical Therapy with a medical diagnosis of   R07.89 (ICD-10-CM) - Non-cardiac chest pain   G58.8 (ICD-10-CM) - Intercostal neuralgia   . Pt presents with pain with thoracic mobility in all planes except flexion.  Pt. Has tenderness along T7-9 levels centrally and bilaterally.  Pt. Educated on importance of spinal mobility.  Pt. Reports increased pain lately and has been working from home sitting in recliner.  Pt. Educated on strengthening scapular muscles as well.  Pt. Educated on importance of core strengthening to improve sitting tolerance also.  Will advanced pt. With core strengthening, posture training, and lifting progression.    Pt prognosis is Good.   Pt will benefit from skilled outpatient Physical Therapy to address the deficits stated above and in the chart below, provide pt/family education, and to maximize pt's level of independence.     Plan of care discussed with  patient: Yes  Pt's spiritual, cultural and educational needs considered and patient is agreeable to the plan of care and goals as stated below:     Anticipated Barriers for therapy: none    Medical Necessity is demonstrated by the following  History  Co-morbidities and personal factors that may impact the plan of care Co-morbidities:   None    Personal Factors:   no deficits     low   Examination  Body Structures and Functions, activity limitations and participation restrictions that may impact the plan of care Body Regions:   trunk    Body Systems:    ROM  strength    Participation Restrictions:   Limited with sitting tolerance    Activity limitations:   Learning and applying knowledge  no deficits    General Tasks and Commands  no deficits    Communication  no deficits    Mobility  no deficits    Self care  no deficits    Domestic Life  no deficits    Interactions/Relationships  no deficits    Life Areas  employment    Community and Social Life  no deficits         low   Clinical Presentation stable and uncomplicated low   Decision Making/ Complexity Score: low     Goals:  Short Term Goals: In 6 weeks   1.I with HEP  2.Patient to perform thoracic ROM in all planes without discomfort.  3.Patient to increase scapular MMT strength to very good to maintain upright posture with ease for work.  4.Patient to have pain less than 5/10 at all times.    Long Term Goals: In 12 weeks  1. Patient to score less than 10% impaired on the FOTO.  2. Patient to tolerate lifting moderate to heavy objects without discomfort.  3. Patient to have decreased pain to 0/10 with sitting or lying down.      Plan   Plan of care Certification: 1/12/2022 to 4/12/22.    Outpatient Physical Therapy 2 times weekly for 12 weeks to include the following interventions: Manual Therapy, Moist Heat/ Ice, Neuromuscular Re-ed, Patient Education, Self Care, Therapeutic Activities and Therapeutic Exercise, e-stim.     Elda Daley, PT    Thank you for this  referral.

## 2022-01-13 ENCOUNTER — PATIENT OUTREACH (OUTPATIENT)
Dept: ADMINISTRATIVE | Facility: OTHER | Age: 52
End: 2022-01-13
Payer: COMMERCIAL

## 2022-01-13 DIAGNOSIS — Z12.11 ENCOUNTER FOR FECAL IMMUNOCHEMICAL TEST SCREENING: Primary | ICD-10-CM

## 2022-01-13 NOTE — PROGRESS NOTES
Health Maintenance Due   Topic Date Due    Colorectal Cancer Screening  Never done    Shingles Vaccine (1 of 2) Never done     Updates were requested from care everywhere.  Chart was reviewed for overdue Proactive [facility] Encounters ([facility]) topics (CRS, Breast Cancer Screening, Eye exam)  Health Maintenance has been updated.  LINKS immunization registry triggered.  Immunizations were reconciled.

## 2022-01-14 ENCOUNTER — TELEPHONE (OUTPATIENT)
Dept: FAMILY MEDICINE | Facility: CLINIC | Age: 52
End: 2022-01-14
Payer: COMMERCIAL

## 2022-01-14 ENCOUNTER — TELEPHONE (OUTPATIENT)
Dept: PAIN MEDICINE | Facility: CLINIC | Age: 52
End: 2022-01-14
Payer: COMMERCIAL

## 2022-01-14 NOTE — TELEPHONE ENCOUNTER
----- Message from Fang Mullen sent at 1/14/2022 10:36 AM CST -----  Contact: [phone]  Patient is calling in requesting a call back for an appt. She stated that her appt was on the 19th and then someone called her and stated she could come in on the 17th  but the office is suppose to be close she is confused and in pain with no apppt. Please call her back at [phone]  Thanks.ln

## 2022-01-14 NOTE — TELEPHONE ENCOUNTER
Tried to call. No answer. Left   Alexis Boyd, MA [facility]   [facility]

## 2022-01-14 NOTE — TELEPHONE ENCOUNTER
----- Message from Christiane Rodriguez sent at 1/14/2022 11:13 AM CST -----  Type:  Needs Medical Advice    Who Called: JAYLA DIAZ [[id number]]  Symptoms (please be specific):   How long has patient had these symptoms:   Pharmacy name and phone #:    Would the patient rather a call back or a response via [facility]?   Best Call Back Number:  [phone]  Additional Information:  Pt is requesting a call from office regarding a pain management appt that was canceled with [facility] and she need to see a pain specialist. Please send last 2 visit concerning her back problems and images/xrays to be faxed to [phone] before 2pm today, so she can be schedule with [facility] on BB. Please call pt.

## 2022-01-26 ENCOUNTER — PATIENT MESSAGE (OUTPATIENT)
Dept: FAMILY MEDICINE | Facility: CLINIC | Age: 52
End: 2022-01-26
Payer: COMMERCIAL

## 2022-02-22 PROBLEM — N95.0 POSTMENOPAUSAL BLEEDING: Status: ACTIVE | Noted: 2022-02-22

## 2022-02-22 PROBLEM — N95.1 PERIMENOPAUSAL: Status: RESOLVED | Noted: 2018-03-13 | Resolved: 2022-02-22

## 2022-02-22 PROBLEM — D25.9 UTERINE LEIOMYOMA: Status: ACTIVE | Noted: 2022-02-22

## 2022-02-23 DIAGNOSIS — D84.9 IMMUNOSUPPRESSED STATUS: ICD-10-CM

## 2022-03-07 ENCOUNTER — PATIENT MESSAGE (OUTPATIENT)
Dept: FAMILY MEDICINE | Facility: CLINIC | Age: 52
End: 2022-03-07
Payer: COMMERCIAL

## 2022-03-08 ENCOUNTER — TELEPHONE (OUTPATIENT)
Dept: FAMILY MEDICINE | Facility: CLINIC | Age: 52
End: 2022-03-08
Payer: COMMERCIAL

## 2022-03-08 NOTE — TELEPHONE ENCOUNTER
Pt states she  Dropped off [facility] paperwork. I do not remember receiving paperwork. Did I possibly give it to you already? She states it was in a gold envelope. If not I can have pt resend it. Please advise.

## 2022-03-18 ENCOUNTER — PATIENT MESSAGE (OUTPATIENT)
Dept: ADMINISTRATIVE | Facility: HOSPITAL | Age: 52
End: 2022-03-18
Payer: COMMERCIAL

## 2022-03-21 ENCOUNTER — PATIENT OUTREACH (OUTPATIENT)
Dept: ADMINISTRATIVE | Facility: OTHER | Age: 52
End: 2022-03-21
Payer: COMMERCIAL

## 2022-03-21 NOTE — PROGRESS NOTES
Health Maintenance Due   Topic Date Due    Colorectal Cancer Screening  Never done    Shingles Vaccine (1 of 2) Never done    COVID-19 Vaccine (3 - Moderna risk 4-dose series) 12/03/2021     Updates were requested from care everywhere.  Chart was reviewed for overdue [facility]active [facility] Encounters ([facility]) topics (CRS, Breast Cancer Screening, Eye exam)  Health Maintenance has been updated.  LINKS immunization registry triggered.  Immunizations were reconciled.

## 2022-03-25 ENCOUNTER — PATIENT OUTREACH (OUTPATIENT)
Dept: ADMINISTRATIVE | Facility: HOSPITAL | Age: 52
End: 2022-03-25
Payer: COMMERCIAL

## 2022-03-25 NOTE — LETTER
AUTHORIZATION FOR RELEASE OF   CONFIDENTIAL INFORMATION      We are seeing Jenna Ovalle, date of birth 1970, in the clinic at [facility]. Franca Richmond MD is the patient's PCP. Jenna Ovalle has an outstanding lab/procedure at the time we reviewed her chart. In order to help keep her health information updated, she has authorized us to request the following medical record(s):        (  )  MAMMOGRAM                                      (  )  COLONOSCOPY      (  )  PAP SMEAR                                          (  )  OUTSIDE LAB RESULTS     (  )  DEXA SCAN                                          (  )  EYE EXAM            (  )  FOOT EXAM                                          (  )  ENTIRE RECORD     (  )  OUTSIDE IMMUNIZATIONS                 ( XX )  Lipid Panel          Please fax records to [facility], Janet W Cook, MD, [phone]     If you have any questions, please contact Conor Cardona at [phone].           Patient Name: Jenna Ovalle  : 1970  Patient Phone #: [phone]

## 2022-03-25 NOTE — PROGRESS NOTES
Contacted patient to follow up on overdue Colonoscopy. Patient states she will be scheduling a colonoscopy with [facility] in the future    Lipid Panel requested

## 2022-04-07 ENCOUNTER — OFFICE VISIT (OUTPATIENT)
Dept: OPHTHALMOLOGY | Facility: CLINIC | Age: 52
End: 2022-04-07
Payer: COMMERCIAL

## 2022-04-07 DIAGNOSIS — E11.9 TYPE 2 DIABETES MELLITUS WITHOUT RETINOPATHY: Primary | ICD-10-CM

## 2022-04-07 DIAGNOSIS — H50.332 INTERMITTENT EXOTROPIA OF LEFT EYE: ICD-10-CM

## 2022-04-07 DIAGNOSIS — H52.13 MYOPIA WITH ASTIGMATISM AND PRESBYOPIA, BILATERAL: ICD-10-CM

## 2022-04-07 DIAGNOSIS — H52.4 MYOPIA WITH ASTIGMATISM AND PRESBYOPIA, BILATERAL: ICD-10-CM

## 2022-04-07 DIAGNOSIS — H52.203 MYOPIA WITH ASTIGMATISM AND PRESBYOPIA, BILATERAL: ICD-10-CM

## 2022-04-07 PROCEDURE — 1159F PR MEDICATION LIST DOCUMENTED IN MEDICAL RECORD: ICD-10-PCS | Mod: CPTII,S$GLB,, | Performed by: OPTOMETRIST

## 2022-04-07 PROCEDURE — 4010F ACE/ARB THERAPY RXD/TAKEN: CPT | Mod: CPTII,S$GLB,, | Performed by: OPTOMETRIST

## 2022-04-07 PROCEDURE — 1160F RVW MEDS BY RX/DR IN RCRD: CPT | Mod: CPTII,S$GLB,, | Performed by: OPTOMETRIST

## 2022-04-07 PROCEDURE — 2023F PR DILATED RETINAL EXAM W/O EVID OF RETINOPATHY: ICD-10-PCS | Mod: CPTII,S$GLB,, | Performed by: OPTOMETRIST

## 2022-04-07 PROCEDURE — 4010F PR ACE/ARB THEARPY RXD/TAKEN: ICD-10-PCS | Mod: CPTII,S$GLB,, | Performed by: OPTOMETRIST

## 2022-04-07 PROCEDURE — 92014 COMPRE OPH EXAM EST PT 1/>: CPT | Mod: S$GLB,,, | Performed by: OPTOMETRIST

## 2022-04-07 PROCEDURE — 1159F MED LIST DOCD IN RCRD: CPT | Mod: CPTII,S$GLB,, | Performed by: OPTOMETRIST

## 2022-04-07 PROCEDURE — 99999 PR PBB SHADOW E&M-EST. PATIENT-LVL II: ICD-10-PCS | Mod: PBBFAC,,, | Performed by: OPTOMETRIST

## 2022-04-07 PROCEDURE — 92014 PR EYE EXAM, EST PATIENT,COMPREHESV: ICD-10-PCS | Mod: S$GLB,,, | Performed by: OPTOMETRIST

## 2022-04-07 PROCEDURE — 2023F DILAT RTA XM W/O RTNOPTHY: CPT | Mod: CPTII,S$GLB,, | Performed by: OPTOMETRIST

## 2022-04-07 PROCEDURE — 92015 DETERMINE REFRACTIVE STATE: CPT | Mod: S$GLB,,, | Performed by: OPTOMETRIST

## 2022-04-07 PROCEDURE — 99999 PR PBB SHADOW E&M-EST. PATIENT-LVL II: CPT | Mod: PBBFAC,,, | Performed by: OPTOMETRIST

## 2022-04-07 PROCEDURE — 1160F PR REVIEW ALL MEDS BY PRESCRIBER/CLIN PHARMACIST DOCUMENTED: ICD-10-PCS | Mod: CPTII,S$GLB,, | Performed by: OPTOMETRIST

## 2022-04-07 PROCEDURE — 92015 PR REFRACTION: ICD-10-PCS | Mod: S$GLB,,, | Performed by: OPTOMETRIST

## 2022-04-07 NOTE — PROGRESS NOTES
HPI     Strabismus     Comments: Vision changes since last eye exam?: yes, finer print harder to   read. Getting headaches   Any eye pain today: no  Other ocular symptoms: no  Interested in contact lens fitting today? no  Pt would like her strabismus checked out  Diagnosed with pre-diabetes 7+ years  Lab Results       Component                Value               Date                       HGBA1C                   5.9 (H)             07/02/2021                    Last edited by Lyly Dixon MA on 4/7/2022  3:55 PM. (History)            Assessment /Plan     For exam results, see Encounter Report.    Type 2 diabetes mellitus without retinopathy  There was no diabetic retinopathy present in either eye today.   Recommended that pt continue care with PCP and/or specialists regarding diabetes.  Follow-up dilated eye exam recommended in 12 months, sooner with any vision changes or new concerns.    Intermittent exotropia of left eye  Myopia with astigmatism and presbyopia, bilateral  Eyeglass Final Rx     Eyeglass Final Rx       Sphere Cylinder Axis    Right -3.75 +2.00 140    Left -5.25 +2.50 049    Type: SVL distance    Expiration Date: 4/7/2023          Eyeglass Final Rx #2       Sphere Cylinder Axis    Right -2.75 +2.00 140    Left -4.25 +2.50 049    Type: SVL computer    Expiration Date: 4/7/2023                RTC 1 yr for dilated eye exam or PRN if any problems.   Discussed above and answered questions.

## 2022-04-25 NOTE — PROGRESS NOTES
"Subjective:       Patient ID: Jenna Ovalle is a 52 y.o. female.      Chief Complaint   Patient presents with    Allergies       HPI    Allergic Rhinitis: Jenna Ovalle is here for evaluation of allergic rhinitis. Patient's symptoms include scratchy throat, PND, eyes burning and runny nose.  Current triggers include exposure to weather changes and 2nd-hand MJ smoke from neighbor in [facility] complex (can smell it coming through vent, "makes me ill").  The patient has been suffering from these symptoms for a few weeks, gradually improving.  The patient has tried over the counter medications with good relief of symptoms.      Review of Systems   Constitutional: Negative for chills and fever.   HENT: Positive for postnasal drip, rhinorrhea and sore throat (itchy throat). Negative for congestion, drooling, ear discharge, ear pain, sinus pressure, sneezing and trouble swallowing.    Eyes: Positive for pain (burning). Negative for discharge, redness and itching.   Respiratory: Negative for cough, shortness of breath and stridor.    Gastrointestinal: Negative for abdominal pain, diarrhea and vomiting.   Musculoskeletal: Negative for neck pain.   Allergic/Immunologic: Positive for environmental allergies.   Neurological: Positive for headaches.         Review of patient's allergies indicates:   Allergen Reactions    No known drug allergies          Patient Active Problem List   Diagnosis    Hypertension    AR (allergic rhinitis)    History of pulmonary embolism    Morbid obesity with BMI of 50.0-59.9, adult    Arthritis of back    Prediabetes    Arthritis of knee    Vitamin D deficiency    Exotropia of left eye    Thoracic radiculitis    Thoracic back pain    Uterine leiomyoma    Postmenopausal bleeding           Current Outpatient Medications:     ascorbic acid-elderberry fruit 100-50 mg Chew, Take by mouth., Disp: , Rfl:     aspirin (ECOTRIN) 81 MG EC tablet, Take 81 mg by mouth once daily., Disp: " ", Rfl:     carvediloL (COREG) 3.125 MG tablet, , Disp: , Rfl:     chlorhexidine (PERIDEX) 0.12 % solution, RINSE MOUTH WITH 1/2 OZ FOR 30 SECONDS TWICE DAILY, Disp: , Rfl:     ergocalciferol (ERGOCALCIFEROL) 50,000 unit Cap, TAKE 1 CAPSULE BY MOUTH EVERY 7 DAYS, Disp: 4 capsule, Rfl: 5    HORIZANT 600 mg TbSR, Take 1 tablet by mouth 2 (two) times daily., Disp: , Rfl:     levocetirizine (XYZAL) 5 MG tablet, TAKE 1 TABLET(5 MG) BY MOUTH EVERY NIGHT AS NEEDED, Disp: 30 tablet, Rfl: 5    losartan (COZAAR) 50 MG tablet, TAKE 1 TABLET(50 MG) BY MOUTH EVERY DAY, Disp: 90 tablet, Rfl: 1    metFORMIN (GLUCOPHAGE-XR) 500 MG ER 24hr tablet, TAKE 3 TABLETS BY MOUTH EVERY DAY, Disp: 90 tablet, Rfl: 5    OZEMPIC 0.25 mg or 0.5 mg(2 mg/1.5 mL) pen injector, Inject into the skin., Disp: , Rfl:     zinc sulfate (ZINCATE) 50 mg zinc (220 mg) capsule, , Disp: , Rfl:       Past medical, surgical, family and social histories have been reviewed today.      Objective:     Vitals:    04/26/22 1552   BP: 128/80   Pulse: 70   Temp: 98 °F (36.7 °C)   SpO2: 98%   Weight: (!) 149.9 kg (330 lb 7.5 oz)   Height: 5' 5" (1.651 m)         Physical Exam  Vitals reviewed.   Constitutional:       General: She is not in acute distress.  HENT:      Head: Normocephalic and atraumatic.      Right Ear: Tympanic membrane normal.      Left Ear: Tympanic membrane normal.      Nose: Mucosal edema and congestion present. No nasal tenderness or rhinorrhea.      Right Turbinates: Pale.      Left Turbinates: Pale.      Right Sinus: No maxillary sinus tenderness or frontal sinus tenderness.      Left Sinus: No maxillary sinus tenderness or frontal sinus tenderness.      Mouth/Throat:      Mouth: Mucous membranes are moist.      Pharynx: Oropharynx is clear. No pharyngeal swelling or posterior oropharyngeal erythema.   Eyes:      General: Lids are normal. Allergic shiner present.         Right eye: No discharge.         Left eye: No discharge.      " Conjunctiva/sclera: Conjunctivae normal.      Pupils: Pupils are equal, round, and reactive to light.   Neck:      Vascular: No carotid bruit.   Cardiovascular:      Rate and Rhythm: Normal rate and regular rhythm.      Pulses: Normal pulses.      Heart sounds: Normal heart sounds.   Pulmonary:      Effort: Pulmonary effort is normal.      Breath sounds: Normal breath sounds.   Musculoskeletal:         General: Normal range of motion.      Cervical back: Normal range of motion and neck supple. No rigidity.      Right lower leg: No edema.      Left lower leg: No edema.   Skin:     Capillary Refill: Capillary refill takes less than 2 seconds.   Neurological:      Mental Status: She is alert and oriented to person, place, and time.      Sensory: No sensory deficit.      Motor: No weakness.      Coordination: Coordination normal.      Gait: Gait normal.   Psychiatric:         Mood and Affect: Mood normal.         Behavior: Behavior normal.         Thought Content: Thought content normal.         Judgment: Judgment normal.         Assessment     1. Seasonal allergic rhinitis, unspecified trigger --- exacerbation of chronic issue, has improved since onset.  Allegra 180 mg daily, Flonase if needed.  Symptomatic care, rest & hydration.  Smoke exposure discussed.       Follow-up       Return to clinic as needed.        YAHIR Campbell  [facility]     20 minutes of total time spent on the encounter, which includes face to face time and non-face to face time preparing to see the patient (eg, review of tests), obtaining and/or reviewing separately obtained history, and documenting clinical information in the electronic or other health record.  Also includes independent interpretation of results (not separately reported) and communicating results to the patient/family/caregiver, with care coordination (not separately reported).

## 2022-04-26 ENCOUNTER — OFFICE VISIT (OUTPATIENT)
Dept: FAMILY MEDICINE | Facility: CLINIC | Age: 52
End: 2022-04-26
Payer: COMMERCIAL

## 2022-04-26 VITALS
SYSTOLIC BLOOD PRESSURE: 128 MMHG | BODY MASS INDEX: 48.82 KG/M2 | HEIGHT: 65 IN | WEIGHT: 293 LBS | HEART RATE: 70 BPM | OXYGEN SATURATION: 98 % | TEMPERATURE: 98 F | DIASTOLIC BLOOD PRESSURE: 80 MMHG

## 2022-04-26 DIAGNOSIS — J30.2 SEASONAL ALLERGIC RHINITIS, UNSPECIFIED TRIGGER: Primary | ICD-10-CM

## 2022-04-26 PROCEDURE — 1159F MED LIST DOCD IN RCRD: CPT | Mod: CPTII,S$GLB,, | Performed by: REGISTERED NURSE

## 2022-04-26 PROCEDURE — 3008F PR BODY MASS INDEX (BMI) DOCUMENTED: ICD-10-PCS | Mod: CPTII,S$GLB,, | Performed by: REGISTERED NURSE

## 2022-04-26 PROCEDURE — 1159F PR MEDICATION LIST DOCUMENTED IN MEDICAL RECORD: ICD-10-PCS | Mod: CPTII,S$GLB,, | Performed by: REGISTERED NURSE

## 2022-04-26 PROCEDURE — 3074F PR MOST RECENT SYSTOLIC BLOOD PRESSURE < 130 MM HG: ICD-10-PCS | Mod: CPTII,S$GLB,, | Performed by: REGISTERED NURSE

## 2022-04-26 PROCEDURE — 3008F BODY MASS INDEX DOCD: CPT | Mod: CPTII,S$GLB,, | Performed by: REGISTERED NURSE

## 2022-04-26 PROCEDURE — 99213 PR OFFICE/OUTPT VISIT, EST, LEVL III, 20-29 MIN: ICD-10-PCS | Mod: S$GLB,,, | Performed by: REGISTERED NURSE

## 2022-04-26 PROCEDURE — 3079F PR MOST RECENT DIASTOLIC BLOOD PRESSURE 80-89 MM HG: ICD-10-PCS | Mod: CPTII,S$GLB,, | Performed by: REGISTERED NURSE

## 2022-04-26 PROCEDURE — 99999 PR PBB SHADOW E&M-EST. PATIENT-LVL III: CPT | Mod: PBBFAC,,, | Performed by: REGISTERED NURSE

## 2022-04-26 PROCEDURE — 3079F DIAST BP 80-89 MM HG: CPT | Mod: CPTII,S$GLB,, | Performed by: REGISTERED NURSE

## 2022-04-26 PROCEDURE — 4010F PR ACE/ARB THEARPY RXD/TAKEN: ICD-10-PCS | Mod: CPTII,S$GLB,, | Performed by: REGISTERED NURSE

## 2022-04-26 PROCEDURE — 99213 OFFICE O/P EST LOW 20 MIN: CPT | Mod: S$GLB,,, | Performed by: REGISTERED NURSE

## 2022-04-26 PROCEDURE — 99999 PR PBB SHADOW E&M-EST. PATIENT-LVL III: ICD-10-PCS | Mod: PBBFAC,,, | Performed by: REGISTERED NURSE

## 2022-04-26 PROCEDURE — 4010F ACE/ARB THERAPY RXD/TAKEN: CPT | Mod: CPTII,S$GLB,, | Performed by: REGISTERED NURSE

## 2022-04-26 PROCEDURE — 3074F SYST BP LT 130 MM HG: CPT | Mod: CPTII,S$GLB,, | Performed by: REGISTERED NURSE

## 2022-05-11 ENCOUNTER — OFFICE VISIT (OUTPATIENT)
Dept: URGENT CARE | Facility: CLINIC | Age: 52
End: 2022-05-11
Payer: COMMERCIAL

## 2022-05-11 VITALS
OXYGEN SATURATION: 96 % | WEIGHT: 293 LBS | HEART RATE: 65 BPM | DIASTOLIC BLOOD PRESSURE: 63 MMHG | SYSTOLIC BLOOD PRESSURE: 128 MMHG | TEMPERATURE: 96 F | RESPIRATION RATE: 18 BRPM | HEIGHT: 65 IN | BODY MASS INDEX: 48.82 KG/M2

## 2022-05-11 DIAGNOSIS — R21 RASH: Primary | ICD-10-CM

## 2022-05-11 PROCEDURE — 4010F ACE/ARB THERAPY RXD/TAKEN: CPT | Mod: CPTII,S$GLB,, | Performed by: NURSE PRACTITIONER

## 2022-05-11 PROCEDURE — 3008F PR BODY MASS INDEX (BMI) DOCUMENTED: ICD-10-PCS | Mod: CPTII,S$GLB,, | Performed by: NURSE PRACTITIONER

## 2022-05-11 PROCEDURE — 4010F PR ACE/ARB THEARPY RXD/TAKEN: ICD-10-PCS | Mod: CPTII,S$GLB,, | Performed by: NURSE PRACTITIONER

## 2022-05-11 PROCEDURE — 3078F PR MOST RECENT DIASTOLIC BLOOD PRESSURE < 80 MM HG: ICD-10-PCS | Mod: CPTII,S$GLB,, | Performed by: NURSE PRACTITIONER

## 2022-05-11 PROCEDURE — 3074F PR MOST RECENT SYSTOLIC BLOOD PRESSURE < 130 MM HG: ICD-10-PCS | Mod: CPTII,S$GLB,, | Performed by: NURSE PRACTITIONER

## 2022-05-11 PROCEDURE — 1160F RVW MEDS BY RX/DR IN RCRD: CPT | Mod: CPTII,S$GLB,, | Performed by: NURSE PRACTITIONER

## 2022-05-11 PROCEDURE — 3008F BODY MASS INDEX DOCD: CPT | Mod: CPTII,S$GLB,, | Performed by: NURSE PRACTITIONER

## 2022-05-11 PROCEDURE — 3074F SYST BP LT 130 MM HG: CPT | Mod: CPTII,S$GLB,, | Performed by: NURSE PRACTITIONER

## 2022-05-11 PROCEDURE — 3078F DIAST BP <80 MM HG: CPT | Mod: CPTII,S$GLB,, | Performed by: NURSE PRACTITIONER

## 2022-05-11 PROCEDURE — 99213 PR OFFICE/OUTPT VISIT, EST, LEVL III, 20-29 MIN: ICD-10-PCS | Mod: S$GLB,,, | Performed by: NURSE PRACTITIONER

## 2022-05-11 PROCEDURE — 1159F MED LIST DOCD IN RCRD: CPT | Mod: CPTII,S$GLB,, | Performed by: NURSE PRACTITIONER

## 2022-05-11 PROCEDURE — 1160F PR REVIEW ALL MEDS BY PRESCRIBER/CLIN PHARMACIST DOCUMENTED: ICD-10-PCS | Mod: CPTII,S$GLB,, | Performed by: NURSE PRACTITIONER

## 2022-05-11 PROCEDURE — 1159F PR MEDICATION LIST DOCUMENTED IN MEDICAL RECORD: ICD-10-PCS | Mod: CPTII,S$GLB,, | Performed by: NURSE PRACTITIONER

## 2022-05-11 PROCEDURE — 99213 OFFICE O/P EST LOW 20 MIN: CPT | Mod: S$GLB,,, | Performed by: NURSE PRACTITIONER

## 2022-05-11 NOTE — PROGRESS NOTES
"Subjective:       Patient ID: Jenna Ovalle is a 52 y.o. female.    Vitals:  height is 5' 5" (1.651 m) and weight is 149.7 kg (330 lb) (abnormal). Her tympanic temperature is 95.8 °F (35.4 °C) (abnormal). Her blood pressure is 128/63 and her pulse is 65. Her respiration is 18 and oxygen saturation is 96%.     Chief Complaint: Rash    Patient presents with a patchy rash on both arms and neck x 4-5 days ago. She just started a new diet and has been drinking protein shakes with whey protein which is a new food item for her. Denies any other new food/drink, detergent and soap/lotion. Denies itching.    Rash  This is a new problem. The current episode started in the past 7 days. The problem is unchanged. The affected locations include the neck, right arm and left arm. The rash is characterized by redness. Pertinent negatives include no anorexia, congestion, cough, diarrhea, eye pain, facial edema, fatigue, fever, joint pain, nail changes, rhinorrhea, shortness of breath, sore throat or vomiting. Past treatments include anti-itch cream and topical steroids. The treatment provided mild relief.       Constitution: Negative for fatigue and fever.   HENT: Negative for congestion and sore throat.    Eyes: Negative for eye pain.   Respiratory: Negative for cough and shortness of breath.    Gastrointestinal: Negative for vomiting and diarrhea.   Skin: Positive for rash. Negative for erythema.       Objective:      Physical Exam   Constitutional: She is oriented to person, place, and time. She appears well-developed.   HENT:   Head: Normocephalic and atraumatic. Head is without abrasion, without contusion and without laceration.   Ears:   Right Ear: External ear normal.   Left Ear: External ear normal.   Nose: Nose normal.   Mouth/Throat: Oropharynx is clear and moist and mucous membranes are normal.   Eyes: Conjunctivae, EOM and lids are normal. Pupils are equal, round, and reactive to light.   Neck: Trachea normal and " phonation normal. Neck supple.   Cardiovascular: Normal rate, regular rhythm and normal heart sounds.   No murmur heard.  Pulmonary/Chest: Effort normal and breath sounds normal. No stridor. No respiratory distress. She has no wheezes.   Musculoskeletal: Normal range of motion.         General: Normal range of motion.   Neurological: She is alert and oriented to person, place, and time.   Skin: Skin is warm, dry, intact and rash. Capillary refill takes less than 2 seconds. No abrasion, No burn, No bruising, No erythema and No ecchymosis        Psychiatric: Her speech is normal and behavior is normal. Judgment and thought content normal.   Nursing note and vitals reviewed.        Assessment:       1. Rash          Plan:         Rash                  Discussed plan of care. Pt agrees to stop drinking whey protein for the next 3-4 days. If lesions continue to show up then follow up with PCP. Says she would like to try a plant based protein powder. Advised pt to wait until next week to begin something new and only if rash has subsided by then.    What care is needed at home?   · Ask your doctor what you need to do when you go home. Make sure you ask questions if you do not understand what the doctor says.  · Use an unscented cream or lotion to keep your skin moist.  · Drink plenty of fluids to keep your body hydrated.  · Bathe with cool or warm water. Do not use hot water. Pat yourself dry with a clean, thick, soft towel. Use mild and unscented soap, moisturizers, and deodorants.  · At-home care to help with scratching:  ? Wear gloves to protect skin on your hands. Try wearing cotton gloves under plastic gloves. Remove both sets of gloves from time to time to prevent sweating.  ? Keep nails short and clean.  ? If you scratch in your sleep, wear white cotton gloves to bed.  ? Try using cool compresses on the skin. They may help with swelling and itching. Dip a cloth in cold water and put it right on your itchy  skin.  What follow-up care is needed?   · Your doctor may ask you to make visits to the office to check on your progress. Be sure to keep these visits.  · You may need to see a doctor who takes care of allergies or a dermatologist.  When do I need to call the doctor?   · You start to have severe trouble breathing or swallowing (for example, you cannot speak in full sentences).  · The rash spreads over large parts of your body and most of your skin becomes red.  · It is becoming hard to breathe, but you can still talk in full sentences.  · You have a fever of 100.4°F (38°C) or higher or chills.  · You have signs of a wound infection like swelling, redness, warmth, pain, or drainage from the wound.

## 2022-05-14 ENCOUNTER — TELEPHONE (OUTPATIENT)
Dept: URGENT CARE | Facility: CLINIC | Age: 52
End: 2022-05-14
Payer: COMMERCIAL

## 2022-05-14 NOTE — TELEPHONE ENCOUNTER
Attempted to contact patient as courtesy call from recent Urgent Care visit on 05.11.2022, patient did not answer, left voicemail. /ines/

## 2022-05-17 ENCOUNTER — OFFICE VISIT (OUTPATIENT)
Dept: FAMILY MEDICINE | Facility: CLINIC | Age: 52
End: 2022-05-17
Payer: COMMERCIAL

## 2022-05-17 VITALS
HEART RATE: 74 BPM | WEIGHT: 293 LBS | SYSTOLIC BLOOD PRESSURE: 104 MMHG | BODY MASS INDEX: 48.82 KG/M2 | HEIGHT: 65 IN | DIASTOLIC BLOOD PRESSURE: 70 MMHG | OXYGEN SATURATION: 98 % | TEMPERATURE: 97 F

## 2022-05-17 DIAGNOSIS — S29.011A MUSCLE STRAIN OF CHEST WALL, INITIAL ENCOUNTER: Primary | ICD-10-CM

## 2022-05-17 PROCEDURE — 99214 OFFICE O/P EST MOD 30 MIN: CPT | Mod: S$GLB,,, | Performed by: REGISTERED NURSE

## 2022-05-17 PROCEDURE — 99999 PR PBB SHADOW E&M-EST. PATIENT-LVL IV: CPT | Mod: PBBFAC,,, | Performed by: REGISTERED NURSE

## 2022-05-17 PROCEDURE — 3074F PR MOST RECENT SYSTOLIC BLOOD PRESSURE < 130 MM HG: ICD-10-PCS | Mod: CPTII,S$GLB,, | Performed by: REGISTERED NURSE

## 2022-05-17 PROCEDURE — 3078F DIAST BP <80 MM HG: CPT | Mod: CPTII,S$GLB,, | Performed by: REGISTERED NURSE

## 2022-05-17 PROCEDURE — 3008F BODY MASS INDEX DOCD: CPT | Mod: CPTII,S$GLB,, | Performed by: REGISTERED NURSE

## 2022-05-17 PROCEDURE — 4010F PR ACE/ARB THEARPY RXD/TAKEN: ICD-10-PCS | Mod: CPTII,S$GLB,, | Performed by: REGISTERED NURSE

## 2022-05-17 PROCEDURE — 3078F PR MOST RECENT DIASTOLIC BLOOD PRESSURE < 80 MM HG: ICD-10-PCS | Mod: CPTII,S$GLB,, | Performed by: REGISTERED NURSE

## 2022-05-17 PROCEDURE — 1159F MED LIST DOCD IN RCRD: CPT | Mod: CPTII,S$GLB,, | Performed by: REGISTERED NURSE

## 2022-05-17 PROCEDURE — 4010F ACE/ARB THERAPY RXD/TAKEN: CPT | Mod: CPTII,S$GLB,, | Performed by: REGISTERED NURSE

## 2022-05-17 PROCEDURE — 99999 PR PBB SHADOW E&M-EST. PATIENT-LVL IV: ICD-10-PCS | Mod: PBBFAC,,, | Performed by: REGISTERED NURSE

## 2022-05-17 PROCEDURE — 1159F PR MEDICATION LIST DOCUMENTED IN MEDICAL RECORD: ICD-10-PCS | Mod: CPTII,S$GLB,, | Performed by: REGISTERED NURSE

## 2022-05-17 PROCEDURE — 99214 PR OFFICE/OUTPT VISIT, EST, LEVL IV, 30-39 MIN: ICD-10-PCS | Mod: S$GLB,,, | Performed by: REGISTERED NURSE

## 2022-05-17 PROCEDURE — 3008F PR BODY MASS INDEX (BMI) DOCUMENTED: ICD-10-PCS | Mod: CPTII,S$GLB,, | Performed by: REGISTERED NURSE

## 2022-05-17 PROCEDURE — 3074F SYST BP LT 130 MM HG: CPT | Mod: CPTII,S$GLB,, | Performed by: REGISTERED NURSE

## 2022-05-17 RX ORDER — SEMAGLUTIDE 1.34 MG/ML
1 INJECTION, SOLUTION SUBCUTANEOUS
COMMUNITY
Start: 2022-04-26 | End: 2023-03-14

## 2022-05-17 RX ORDER — METHOCARBAMOL 500 MG/1
500 TABLET, FILM COATED ORAL 4 TIMES DAILY PRN
Qty: 60 TABLET | Refills: 0 | Status: SHIPPED | OUTPATIENT
Start: 2022-05-17 | End: 2023-03-14

## 2022-05-17 NOTE — PROGRESS NOTES
Subjective:       Patient ID: Jenna Ovalle is a 52 y.o. female.      Chief Complaint   Patient presents with    Chest Pain         HPI    Chest Pain   This is a new problem. The current episode started in the past 7 days. The onset quality is sudden. The problem has been unchanged. The pain is present in the lateral region. Quality: twinge. The pain does not radiate. Pertinent negatives include no palpitations.        Review of Systems   Constitutional: Positive for activity change (exercising more, lifting weights at gym).   HENT: Negative.    Respiratory: Negative.    Cardiovascular: Positive for chest pain (not constant). Negative for palpitations and leg swelling.   Neurological: Negative.          Review of patient's allergies indicates:   Allergen Reactions    No known drug allergies        Patient Active Problem List   Diagnosis    Hypertension    AR (allergic rhinitis)    History of pulmonary embolism    Morbid obesity with BMI of 50.0-59.9, adult    Arthritis of back    Prediabetes    Arthritis of knee    Vitamin D deficiency    Exotropia of left eye    Thoracic radiculitis    Thoracic back pain    Uterine leiomyoma    Postmenopausal bleeding         Current Outpatient Medications:     ascorbic acid-elderberry fruit 100-50 mg Chew, Take by mouth., Disp: , Rfl:     aspirin (ECOTRIN) 81 MG EC tablet, Take 81 mg by mouth once daily., Disp: , Rfl:     carvediloL (COREG) 3.125 MG tablet, , Disp: , Rfl:     chlorhexidine (PERIDEX) 0.12 % solution, RINSE MOUTH WITH 1/2 OZ FOR 30 SECONDS TWICE DAILY, Disp: , Rfl:     ergocalciferol (ERGOCALCIFEROL) 50,000 unit Cap, TAKE 1 CAPSULE BY MOUTH EVERY 7 DAYS, Disp: 4 capsule, Rfl: 5    HORIZANT 600 mg TbSR, Take 1 tablet by mouth 2 (two) times daily., Disp: , Rfl:     levocetirizine (XYZAL) 5 MG tablet, TAKE 1 TABLET(5 MG) BY MOUTH EVERY NIGHT AS NEEDED, Disp: 30 tablet, Rfl: 5    losartan (COZAAR) 50 MG tablet, TAKE 1 TABLET(50 MG) BY MOUTH  "EVERY DAY, Disp: 90 tablet, Rfl: 1    metFORMIN (GLUCOPHAGE-XR) 500 MG ER 24hr tablet, TAKE 3 TABLETS BY MOUTH EVERY DAY, Disp: 90 tablet, Rfl: 5    OZEMPIC 0.25 mg or 0.5 mg(2 mg/1.5 mL) pen injector, Inject into the skin., Disp: , Rfl:     zinc sulfate (ZINCATE) 50 mg zinc (220 mg) capsule, , Disp: , Rfl:       Past medical, surgical, family and social histories have been reviewed today.      Objective:     Vitals:    05/17/22 1147   BP: 104/70   Pulse: 74   Temp: 96.8 °F (36 °C)   SpO2: 98%   Weight: (!) 144.2 kg (317 lb 12.7 oz)   Height: 5' 5" (1.651 m)   PainSc: 0-No pain         Physical Exam  Vitals reviewed.   Constitutional:       General: She is not in acute distress.  Eyes:      Pupils: Pupils are equal, round, and reactive to light.   Neck:      Vascular: No carotid bruit.   Cardiovascular:      Rate and Rhythm: Normal rate and regular rhythm.      Pulses: Normal pulses.      Heart sounds: Normal heart sounds.   Pulmonary:      Effort: Pulmonary effort is normal. No respiratory distress.      Breath sounds: Normal breath sounds.   Chest:      Chest wall: Tenderness (left lateral) present. No mass, deformity, swelling or edema. There is no dullness to percussion.   Musculoskeletal:         General: Normal range of motion.      Cervical back: Normal range of motion and neck supple. No rigidity.      Right lower leg: No edema.      Left lower leg: No edema.   Skin:     Capillary Refill: Capillary refill takes less than 2 seconds.   Neurological:      Mental Status: She is alert and oriented to person, place, and time.      Sensory: No sensory deficit.      Motor: No weakness.      Coordination: Coordination normal.      Gait: Gait normal.   Psychiatric:         Mood and Affect: Mood normal.         Behavior: Behavior normal.         Thought Content: Thought content normal.         Judgment: Judgment normal.         Assessment     1. Muscle strain of chest wall, initial encounter -- new problem reported, " likely due to increased exercise & lifting weights.  Muscle relaxer, otc pain medication, and local heat/cold as directed.  Avoid heavy lifting for now.  - methocarbamoL (ROBAXIN) 500 MG Tab; Take 1 tablet (500 mg total) by mouth 4 (four) times daily as needed (muscle pain).  Dispense: 60 tablet; Refill: 0       Follow-up     Contact office back in 3 to 4 days if worse or no better.  RTC prn.        YAHIR Campbell  [facility]       30 minutes of total time spent on the encounter, which includes face to face time and non-face to face time preparing to see the patient (eg, review of tests), obtaining and/or reviewing separately obtained history, and documenting clinical information in the electronic or other health record.  Also includes independent interpretation of results (not separately reported) and communicating results to the patient/family/caregiver, with care coordination (not separately reported).

## 2022-06-08 ENCOUNTER — PATIENT MESSAGE (OUTPATIENT)
Dept: INTERNAL MEDICINE | Facility: CLINIC | Age: 52
End: 2022-06-08
Payer: COMMERCIAL

## 2022-06-19 ENCOUNTER — OFFICE VISIT (OUTPATIENT)
Dept: URGENT CARE | Facility: CLINIC | Age: 52
End: 2022-06-19
Payer: COMMERCIAL

## 2022-06-19 VITALS
OXYGEN SATURATION: 96 % | SYSTOLIC BLOOD PRESSURE: 118 MMHG | BODY MASS INDEX: 48.82 KG/M2 | WEIGHT: 293 LBS | DIASTOLIC BLOOD PRESSURE: 70 MMHG | HEART RATE: 101 BPM | HEIGHT: 65 IN | TEMPERATURE: 102 F

## 2022-06-19 DIAGNOSIS — U07.1 COVID-19: Primary | ICD-10-CM

## 2022-06-19 DIAGNOSIS — U07.1 COVID-19 VIRUS DETECTED: ICD-10-CM

## 2022-06-19 LAB
CTP QC/QA: YES
SARS-COV-2 RDRP RESP QL NAA+PROBE: POSITIVE

## 2022-06-19 PROCEDURE — U0002 COVID-19 LAB TEST NON-CDC: HCPCS | Mod: QW,S$GLB,, | Performed by: PHYSICIAN ASSISTANT

## 2022-06-19 PROCEDURE — 4010F PR ACE/ARB THEARPY RXD/TAKEN: ICD-10-PCS | Mod: CPTII,S$GLB,, | Performed by: PHYSICIAN ASSISTANT

## 2022-06-19 PROCEDURE — 1159F PR MEDICATION LIST DOCUMENTED IN MEDICAL RECORD: ICD-10-PCS | Mod: CPTII,S$GLB,, | Performed by: PHYSICIAN ASSISTANT

## 2022-06-19 PROCEDURE — 1160F RVW MEDS BY RX/DR IN RCRD: CPT | Mod: CPTII,S$GLB,, | Performed by: PHYSICIAN ASSISTANT

## 2022-06-19 PROCEDURE — 3078F PR MOST RECENT DIASTOLIC BLOOD PRESSURE < 80 MM HG: ICD-10-PCS | Mod: CPTII,S$GLB,, | Performed by: PHYSICIAN ASSISTANT

## 2022-06-19 PROCEDURE — 3078F DIAST BP <80 MM HG: CPT | Mod: CPTII,S$GLB,, | Performed by: PHYSICIAN ASSISTANT

## 2022-06-19 PROCEDURE — 3074F PR MOST RECENT SYSTOLIC BLOOD PRESSURE < 130 MM HG: ICD-10-PCS | Mod: CPTII,S$GLB,, | Performed by: PHYSICIAN ASSISTANT

## 2022-06-19 PROCEDURE — 3074F SYST BP LT 130 MM HG: CPT | Mod: CPTII,S$GLB,, | Performed by: PHYSICIAN ASSISTANT

## 2022-06-19 PROCEDURE — 4010F ACE/ARB THERAPY RXD/TAKEN: CPT | Mod: CPTII,S$GLB,, | Performed by: PHYSICIAN ASSISTANT

## 2022-06-19 PROCEDURE — U0002: ICD-10-PCS | Mod: QW,S$GLB,, | Performed by: PHYSICIAN ASSISTANT

## 2022-06-19 PROCEDURE — 99213 PR OFFICE/OUTPT VISIT, EST, LEVL III, 20-29 MIN: ICD-10-PCS | Mod: S$GLB,,, | Performed by: PHYSICIAN ASSISTANT

## 2022-06-19 PROCEDURE — 1159F MED LIST DOCD IN RCRD: CPT | Mod: CPTII,S$GLB,, | Performed by: PHYSICIAN ASSISTANT

## 2022-06-19 PROCEDURE — 3008F PR BODY MASS INDEX (BMI) DOCUMENTED: ICD-10-PCS | Mod: CPTII,S$GLB,, | Performed by: PHYSICIAN ASSISTANT

## 2022-06-19 PROCEDURE — 1160F PR REVIEW ALL MEDS BY PRESCRIBER/CLIN PHARMACIST DOCUMENTED: ICD-10-PCS | Mod: CPTII,S$GLB,, | Performed by: PHYSICIAN ASSISTANT

## 2022-06-19 PROCEDURE — 99213 OFFICE O/P EST LOW 20 MIN: CPT | Mod: S$GLB,,, | Performed by: PHYSICIAN ASSISTANT

## 2022-06-19 PROCEDURE — 3008F BODY MASS INDEX DOCD: CPT | Mod: CPTII,S$GLB,, | Performed by: PHYSICIAN ASSISTANT

## 2022-06-19 RX ORDER — ACETAMINOPHEN 500 MG
1000 TABLET ORAL
Status: COMPLETED | OUTPATIENT
Start: 2022-06-19 | End: 2022-06-19

## 2022-06-19 RX ORDER — BENZONATATE 100 MG/1
200 CAPSULE ORAL 3 TIMES DAILY PRN
Qty: 60 CAPSULE | Refills: 0 | Status: SHIPPED | OUTPATIENT
Start: 2022-06-19 | End: 2022-07-07

## 2022-06-19 RX ADMIN — Medication 1000 MG: at 10:06

## 2022-06-19 NOTE — PROGRESS NOTES
"Subjective:       Patient ID: Jenna Ovalle is a 52 y.o. female.    Vitals:  height is 5' 5" (1.651 m) and weight is 143.8 kg (317 lb) (abnormal). Her temperature is 101.9 °F (38.8 °C) (abnormal). Her blood pressure is 118/70 and her pulse is 101. Her oxygen saturation is 96%.     Chief Complaint: Sore Throat    Patient is a 52-year-old female presents with a history sore throat, headaches, fever for 2 days.  Patient states her symptoms are worsening.  Patient states she also has some associated congestion mild ear pressure.  Patient denies any chest pain shortness breast.  Patient states her cough is sometimes productive.  Patient denies any nausea, vomiting, diarrhea.  Patient has known exposure to COVID-19.  Patient is vaccinated at this time.    Sore Throat   This is a new problem. The current episode started in the past 7 days. The problem has been gradually worsening. The maximum temperature recorded prior to her arrival was 101 - 101.9 F. The pain is at a severity of 8/10. The pain is moderate. Associated symptoms include congestion, coughing, headaches, a hoarse voice and trouble swallowing. Pertinent negatives include no abdominal pain, diarrhea, drooling, ear discharge, ear pain, plugged ear sensation, neck pain, shortness of breath, stridor, swollen glands or vomiting. Treatments tried: pain and allergy medicine. The treatment provided no relief.       Constitution: Positive for chills and fever.   HENT: Positive for congestion, sore throat and trouble swallowing. Negative for ear pain, ear discharge, drooling, postnasal drip, sinus pain and sinus pressure.    Neck: Negative for neck pain and painful lymph nodes.   Cardiovascular: Negative for chest pain.   Respiratory: Positive for cough. Negative for sputum production, shortness of breath, stridor and wheezing.    Gastrointestinal: Negative for abdominal pain, nausea, vomiting and diarrhea.   Musculoskeletal: Positive for muscle ache. "   Neurological: Positive for headaches.   Hematologic/Lymphatic: Negative for swollen lymph nodes.       Objective:      Physical Exam   Constitutional: She is oriented to person, place, and time. She appears well-developed. She is cooperative.  Non-toxic appearance. She does not appear ill. No distress.   HENT:   Head: Normocephalic and atraumatic.   Ears:   Right Ear: Hearing, tympanic membrane, external ear and ear canal normal.   Left Ear: Hearing, tympanic membrane, external ear and ear canal normal.   Nose: Congestion present. No mucosal edema, rhinorrhea or nasal deformity. No epistaxis. Right sinus exhibits no maxillary sinus tenderness and no frontal sinus tenderness. Left sinus exhibits no maxillary sinus tenderness and no frontal sinus tenderness.   Mouth/Throat: Uvula is midline and mucous membranes are normal. No trismus in the jaw. Normal dentition. No uvula swelling. Posterior oropharyngeal erythema present. No oropharyngeal exudate or posterior oropharyngeal edema.   Eyes: Conjunctivae and lids are normal. No scleral icterus.   Neck: Trachea normal and phonation normal. Neck supple. No edema present. No erythema present. No neck rigidity present.   Cardiovascular: Normal rate, regular rhythm, normal heart sounds and normal pulses.   No murmur heard.  Pulmonary/Chest: Effort normal and breath sounds normal. No respiratory distress. She has no decreased breath sounds. She has no wheezes. She has no rhonchi.   Abdominal: Normal appearance.   Musculoskeletal: Normal range of motion.         General: No deformity. Normal range of motion.   Lymphadenopathy:     She has no cervical adenopathy.   Neurological: She is alert and oriented to person, place, and time. She exhibits normal muscle tone. Coordination normal.   Skin: Skin is warm, dry, intact, not diaphoretic and not pale.   Psychiatric: Her speech is normal and behavior is normal. Judgment and thought content normal.   Nursing note and vitals  reviewed.    Results for orders placed or performed in visit on 06/19/22   POCT COVID-19 Rapid Screening   Result Value Ref Range    POC Rapid COVID Positive (A) Negative     Acceptable Yes            Assessment:       1. COVID-19          Plan:         COVID-19  -     POCT COVID-19 Rapid Screening  -     acetaminophen tablet 1,000 mg  -     benzonatate (TESSALON PERLES) 100 MG capsule; Take 2 capsules (200 mg total) by mouth 3 (three) times daily as needed for Cough.  Dispense: 60 capsule; Refill: 0  -     nirmatrelvir-ritonavir 150 mg x 2- 100 mg copackaged tablets (EUA); Take 3 tablets by mouth 2 (two) times daily for 5 days. Each dose contains 2 nirmatrelvir (pink tablets) and 1 ritonavir (white tablet). Take all 3 tablets together  Dispense: 30 tablet; Refill: 0    Discussed results with patient and proper quarantine based on CDC guidelines.   Discussed use of OTC medications for symptom control as this is a viral disease. Discussed Paxlovid as treatment plan. Discussed side-effects and risk benefit. Medications check for interactions.   All ER precautions covered including but not limited to shortness of breath, intractable fever, or chest pain.  Discussed RTC if symptoms worsen, change, or persist.     Patient verbalized understanding and agreed with the plan.     Flor Mitchell PA-C    Patient Instructions   Your test was POSITIVE for COVID-19 (coronavirus).       Please isolate yourself at home.  You may leave home and/or return to work once the following conditions are met:    If you were not hospitalized and are not severely immunocompromised*:   More than 10 days since symptoms first appeared AND   More than 24 hours fever free without medications AND   Symptoms have improved     If you were hospitalized OR are severely immunocompromised*:   More than 20 days since symptoms first appeared   More than 24 hours fever free without medications   Symptoms have improved    If you had no  symptoms but tested positive:   More than 10 days since the date of the first positive test (20 days if severely immunocompromised).   If you develop symptoms, then use the guidelines above.     *Definition of severely immunocompromised:  - Current chemotherapy for cancer  - Untreated HIV with CD4 count less than 200  - Combined primary immunodeficiency disorder  - Prednisone more than 20 mg per day for more than 14 days  - Post-transplant patients    Additional instructions:   Separate yourself from other people and animals in your home.   Call ahead before visiting your doctor.   Wear a facemask when around others.   Cover your coughs and sneezes.   Wash your hands often with soap and water; hand  can be used, too.   Avoid sharing personal household items.   Wipe down surfaces used daily.   Monitor your symptoms. Seek prompt medical attention if your illness is worsening (e.g., difficulty breathing).    Before seeking care, call your healthcare provider.   If you have a medical emergency and need to call 911, notify the dispatch personnel that you have, or are being evaluated for COVID-19. If possible, put on a facemask before emergency medical services arrive.      Contact Tracing    As one of the next steps, you will receive a call or text from the [place] Department of Health ([facility]) COVID-19 Tracing Team. See the contact information below so you know not to ignore the health departments call. It is important that you contact them back immediately so they can help.      Contact Tracer Number:  [phone]  Caller ID for most carriers: [place] Dept Health     What is contact tracing?  · Contact tracing is a process that helps identify everyone who has been in close contact with an infected person. Contact tracers let those people know they may have been exposed and guide them on next steps. Confidentiality is important for everyone; no one will be told who may have exposed them to the  virus.  · Your involvement is important. The more we know about where and how this virus is spreading, the better chance we have at stopping it from spreading further.  What does exposure mean?  · Exposure means you have been within 6 feet for more than 15 minutes with a person who has or had COVID-19.  What kind of questions do the contact tracers ask?  · A contact tracer will confirm your basic contact information including name, address, phone number, and next of kin, as well as asking about any symptoms you may have had. Theyll also ask you how you think you may have gotten sick, such as places where you may have been exposed to the virus, and people you were with. Those names will never be shared with anyone outside of that call, and will only be used to help trace and stop the spread of the virus.   I have privacy concerns. How will the state use my information?  · Your privacy about your health is important. All calls are completed using call centers that use the appropriate health privacy protection measures (HIPAA compliance), meaning that your patient information is safe. No one will ever ask you any questions related to immigration status. Your health comes first.   Do I have to participate?  · You do not have to participate, but we strongly encourage you to. Contact tracing can help us catch and control new outbreaks as theyre developing to keep your friends and family safe.   What if I dont hear from anyone?  · If you dont receive a call within 24 hours, you can call the number above right away to inquire about your status. That line is open from 8:00 am - 8:00 p.m., 7 days a week.  Contact tracing saves lives! Together, we have the power to beat this virus and keep our loved ones and neighbors safe.    For more information see CDC link below.      https://www.cdc.gov/coronavirus/2019-ncov/hcp/guidance-prevent-spread.html#precautions        Sources:  [facility], [place] Department [facility] and  Hospitals           Sincerely,     Flor Mitchell PA-C    PLEASE READ YOUR DISCHARGE INSTRUCTIONS ENTIRELY AS IT CONTAINS IMPORTANT INFORMATION.      Please drink plenty of fluids.    Please get plenty of rest.    Please return here or go to the Emergency Department for any concerns or worsening of condition.    Please take an over the counter antihistamine medication (allegra/Claritin/Zyrtec) of your choice as directed.    Try an over the counter decongestant like Mucinex D or Sudafed. You buy this behind the pharmacy counter    If you do have Hypertension or palpitations, it is safe to take Coricidin HBP for relief of sinus symptoms.    If not allergic, please take over the counter Tylenol (Acetaminophen) and/or Motrin (Ibuprofen) as directed for control of pain and/or fever.  Please follow up with your primary care doctor or specialist as needed.    Sore throat recommendations: Warm fluids, warm salt water gargles, throat lozenges, tea, honey, soup, rest, hydration.    Use over the counter flonase: one spray each nostril twice daily OR two sprays each nostril once daily.     Sinus rinses DO NOT USE TAP WATER, if you must, water must be a rolling boil for 1 minute, let it cool, then use.  May use distilled water, or over the counter nasal saline rinses.  Vics vapor rub in shower to help open nasal passages.  May use nasal gel to keep passages moisturized.  May use Nasal saline sprays during the day for added relief of congestion.   For those who go to the gym, please do not use the sauna or steam room now to clear sinuses.    If you  smoke, please stop smoking.      Please return or see your primary care doctor if you develop new or worsening symptoms.     Please arrange follow up with your primary medical clinic as soon as possible. You must understand that you've received an Urgent Care treatment only and that you may be released before all of your medical problems are known or treated. You, the patient, will  arrange for follow up as instructed. If your symptoms worsen or fail to improve you should go to the Emergency Room.

## 2022-06-19 NOTE — LETTER
[id number] [facility] [facility] [address] ? [place], [address] ? Phone [phone] ? Fax             Return to Work/School    Patient: Jenna Ovalle  YOB: 1970   Date: 06/19/2022      To Whom It May Concern:     Jenna Ovalle was in contact with/seen in my office on 06/19/2022. COVID-19 is present in our communities across the [facility]. Not all patients are eligible or appropriate to be tested. In this situation, your employee meets the following criteria:     Jenna Ovalle has met the criteria for COVID-19 testing and has a POSITIVE result. She can return to work once they are asymptomatic for 24 hours without the use of fever reducing medications AND at least five days from the start of symptoms (or from the first positive result if they have no symptoms).      If you have any questions or concerns, or if I can be of further assistance, please do not hesitate to contact me.     Sincerely,    Flor Mitchell PA-C

## 2022-06-19 NOTE — PATIENT INSTRUCTIONS
Your test was POSITIVE for COVID-19 (coronavirus).       Please isolate yourself at home.  You may leave home and/or return to work once the following conditions are met:    If you were not hospitalized and are not severely immunocompromised*:  More than 10 days since symptoms first appeared AND  More than 24 hours fever free without medications AND  Symptoms have improved     If you were hospitalized OR are severely immunocompromised*:  More than 20 days since symptoms first appeared  More than 24 hours fever free without medications  Symptoms have improved    If you had no symptoms but tested positive:  More than 10 days since the date of the first positive test (20 days if severely immunocompromised).   If you develop symptoms, then use the guidelines above.     *Definition of severely immunocompromised:  Current chemotherapy for cancer  Untreated HIV with CD4 count less than 200  Combined primary immunodeficiency disorder  Prednisone more than 20 mg per day for more than 14 days  Post-transplant patients    Additional instructions:  Separate yourself from other people and animals in your home.  Call ahead before visiting your doctor.  Wear a facemask when around others.  Cover your coughs and sneezes.  Wash your hands often with soap and water; hand  can be used, too.  Avoid sharing personal household items.  Wipe down surfaces used daily.  Monitor your symptoms. Seek prompt medical attention if your illness is worsening (e.g., difficulty breathing).   Before seeking care, call your healthcare provider.  If you have a medical emergency and need to call 911, notify the dispatch personnel that you have, or are being evaluated for COVID-19. If possible, put on a facemask before emergency medical services arrive.      Contact Tracing    As one of the next steps, you will receive a call or text from the [place] [facility] ([facility]) COVID-19 Tracing Team. See the contact information below so you know  not to ignore the health departments call. It is important that you contact them back immediately so they can help.      Contact Tracer Number:  [phone]  Caller ID for most carriers: [place] [facility]     What is contact tracing?  Contact tracing is a process that helps identify everyone who has been in close contact with an infected person. Contact tracers let those people know they may have been exposed and guide them on next steps. Confidentiality is important for everyone; no one will be told who may have exposed them to the virus.  Your involvement is important. The more we know about where and how this virus is spreading, the better chance we have at stopping it from spreading further.  What does exposure mean?  Exposure means you have been within 6 feet for more than 15 minutes with a person who has or had COVID-19.  What kind of questions do the contact tracers ask?  A contact tracer will confirm your basic contact information including name, address, phone number, and next of kin, as well as asking about any symptoms you may have had. Theyll also ask you how you think you may have gotten sick, such as places where you may have been exposed to the virus, and people you were with. Those names will never be shared with anyone outside of that call, and will only be used to help trace and stop the spread of the virus.   I have privacy concerns. How will the state use my information?  Your privacy about your health is important. All calls are completed using call centers that use the appropriate health privacy protection measures (HIPAA compliance), meaning that your patient information is safe. No one will ever ask you any questions related to immigration status. Your health comes first.   Do I have to participate?  You do not have to participate, but we strongly encourage you to. Contact tracing can help us catch and control new outbreaks as theyre developing to keep your friends and family safe.   What  if I dont hear from anyone?  If you dont receive a call within 24 hours, you can call the number above right away to inquire about your status. That line is open from 8:00 am - 8:00 p.m., 7 days a week.  Contact tracing saves lives! Together, we have the power to beat this virus and keep our loved ones and neighbors safe.    For more information see CDC link below.      https://www.cdc.gov/coronavirus/2019-ncov/hcp/guidance-prevent-spread.html#precautions        Sources:  [facility], [place] Department [facility] and Hospitals           Sincerely,     Flor Mitchell PA-C    PLEASE READ YOUR DISCHARGE INSTRUCTIONS ENTIRELY AS IT CONTAINS IMPORTANT INFORMATION.      Please drink plenty of fluids.    Please get plenty of rest.    Please return here or go to the Emergency Department for any concerns or worsening of condition.    Please take an over the counter antihistamine medication (allegra/Claritin/Zyrtec) of your choice as directed.    Try an over the counter decongestant like Mucinex D or Sudafed. You buy this behind the pharmacy counter    If you do have Hypertension or palpitations, it is safe to take Coricidin HBP for relief of sinus symptoms.    If not allergic, please take over the counter Tylenol (Acetaminophen) and/or Motrin (Ibuprofen) as directed for control of pain and/or fever.  Please follow up with your primary care doctor or specialist as needed.    Sore throat recommendations: Warm fluids, warm salt water gargles, throat lozenges, tea, honey, soup, rest, hydration.    Use over the counter flonase: one spray each nostril twice daily OR two sprays each nostril once daily.     Sinus rinses DO NOT USE TAP WATER, if you must, water must be a rolling boil for 1 minute, let it cool, then use.  May use distilled water, or over the counter nasal saline rinses.  Vics vapor rub in shower to help open nasal passages.  May use nasal gel to keep passages moisturized.  May use Nasal saline sprays during the day for  added relief of congestion.   For those who go to the gym, please do not use the sauna or steam room now to clear sinuses.    If you  smoke, please stop smoking.      Please return or see your primary care doctor if you develop new or worsening symptoms.     Please arrange follow up with your primary medical clinic as soon as possible. You must understand that you've received an Urgent Care treatment only and that you may be released before all of your medical problems are known or treated. You, the patient, will arrange for follow up as instructed. If your symptoms worsen or fail to improve you should go to the Emergency Room.

## 2022-06-20 ENCOUNTER — NURSE TRIAGE (OUTPATIENT)
Dept: ADMINISTRATIVE | Facility: CLINIC | Age: 52
End: 2022-06-20
Payer: COMMERCIAL

## 2022-06-20 ENCOUNTER — HOSPITAL ENCOUNTER (OUTPATIENT)
Dept: RADIOLOGY | Facility: CLINIC | Age: 52
Discharge: HOME | End: 2022-06-20
Attending: PHYSICIAN ASSISTANT
Payer: COMMERCIAL

## 2022-06-20 ENCOUNTER — OFFICE VISIT (OUTPATIENT)
Dept: URGENT CARE | Facility: CLINIC | Age: 52
End: 2022-06-20
Payer: COMMERCIAL

## 2022-06-20 VITALS
WEIGHT: 293 LBS | BODY MASS INDEX: 47.09 KG/M2 | HEIGHT: 66 IN | TEMPERATURE: 99 F | DIASTOLIC BLOOD PRESSURE: 59 MMHG | OXYGEN SATURATION: 96 % | HEART RATE: 67 BPM | RESPIRATION RATE: 18 BRPM | SYSTOLIC BLOOD PRESSURE: 120 MMHG

## 2022-06-20 DIAGNOSIS — R07.89 CHEST DISCOMFORT: ICD-10-CM

## 2022-06-20 DIAGNOSIS — U07.1 COVID-19: ICD-10-CM

## 2022-06-20 DIAGNOSIS — U07.1 PNEUMONIA DUE TO COVID-19 VIRUS: Primary | ICD-10-CM

## 2022-06-20 DIAGNOSIS — J12.82 PNEUMONIA DUE TO COVID-19 VIRUS: Primary | ICD-10-CM

## 2022-06-20 PROCEDURE — 3074F PR MOST RECENT SYSTOLIC BLOOD PRESSURE < 130 MM HG: ICD-10-PCS | Mod: CPTII,S$GLB,, | Performed by: PHYSICIAN ASSISTANT

## 2022-06-20 PROCEDURE — 4010F PR ACE/ARB THEARPY RXD/TAKEN: ICD-10-PCS | Mod: CPTII,S$GLB,, | Performed by: PHYSICIAN ASSISTANT

## 2022-06-20 PROCEDURE — 99214 PR OFFICE/OUTPT VISIT, EST, LEVL IV, 30-39 MIN: ICD-10-PCS | Mod: S$GLB,,, | Performed by: PHYSICIAN ASSISTANT

## 2022-06-20 PROCEDURE — 3078F PR MOST RECENT DIASTOLIC BLOOD PRESSURE < 80 MM HG: ICD-10-PCS | Mod: CPTII,S$GLB,, | Performed by: PHYSICIAN ASSISTANT

## 2022-06-20 PROCEDURE — 4010F ACE/ARB THERAPY RXD/TAKEN: CPT | Mod: CPTII,S$GLB,, | Performed by: PHYSICIAN ASSISTANT

## 2022-06-20 PROCEDURE — 3008F PR BODY MASS INDEX (BMI) DOCUMENTED: ICD-10-PCS | Mod: CPTII,S$GLB,, | Performed by: PHYSICIAN ASSISTANT

## 2022-06-20 PROCEDURE — 3008F BODY MASS INDEX DOCD: CPT | Mod: CPTII,S$GLB,, | Performed by: PHYSICIAN ASSISTANT

## 2022-06-20 PROCEDURE — 3078F DIAST BP <80 MM HG: CPT | Mod: CPTII,S$GLB,, | Performed by: PHYSICIAN ASSISTANT

## 2022-06-20 PROCEDURE — 71046 X-RAY EXAM CHEST 2 VIEWS: CPT | Mod: S$GLB,,, | Performed by: RADIOLOGY

## 2022-06-20 PROCEDURE — 71046 XR CHEST PA AND LATERAL: ICD-10-PCS | Mod: S$GLB,,, | Performed by: RADIOLOGY

## 2022-06-20 PROCEDURE — 1159F MED LIST DOCD IN RCRD: CPT | Mod: CPTII,S$GLB,, | Performed by: PHYSICIAN ASSISTANT

## 2022-06-20 PROCEDURE — 1160F PR REVIEW ALL MEDS BY PRESCRIBER/CLIN PHARMACIST DOCUMENTED: ICD-10-PCS | Mod: CPTII,S$GLB,, | Performed by: PHYSICIAN ASSISTANT

## 2022-06-20 PROCEDURE — 1159F PR MEDICATION LIST DOCUMENTED IN MEDICAL RECORD: ICD-10-PCS | Mod: CPTII,S$GLB,, | Performed by: PHYSICIAN ASSISTANT

## 2022-06-20 PROCEDURE — 99214 OFFICE O/P EST MOD 30 MIN: CPT | Mod: S$GLB,,, | Performed by: PHYSICIAN ASSISTANT

## 2022-06-20 PROCEDURE — 3074F SYST BP LT 130 MM HG: CPT | Mod: CPTII,S$GLB,, | Performed by: PHYSICIAN ASSISTANT

## 2022-06-20 PROCEDURE — 1160F RVW MEDS BY RX/DR IN RCRD: CPT | Mod: CPTII,S$GLB,, | Performed by: PHYSICIAN ASSISTANT

## 2022-06-20 NOTE — TELEPHONE ENCOUNTER
Pt responded to [facility] text message. Pt reports that she was diagnosed with COVID recently. Sates that she is starting t o cough up dark green sputum. Reports having CP with cough. Pt advised to e seen in ED/UC. Pt verbalized understanding.    Reason for Disposition   Chest pain or pressure    Additional Information   Negative: SEVERE difficulty breathing (e.g., struggling for each breath, speaks in single words)   Negative: Difficult to awaken or acting confused (e.g., disoriented, slurred speech)   Negative: Bluish (or gray) lips or face now   Negative: Shock suspected (e.g., cold/pale/clammy skin, too weak to stand, low BP, rapid pulse)   Negative: Sounds like a life-threatening emergency to the triager   Negative: SEVERE or constant chest pain or pressure  (Exception: Mild central chest pain, present only when coughing.)   Negative: MODERATE difficulty breathing (e.g., speaks in phrases, SOB even at rest, pulse 100-120)   Negative: Headache and stiff neck (can't touch chin to chest)   Negative: Oxygen level (e.g., pulse oximetry) 90 percent or lower    Protocols used: CORONAVIRUS (COVID-19) DIAGNOSED OR [id number]

## 2022-06-20 NOTE — PROGRESS NOTES
"Subjective:       Patient ID: Jenna Ovalle is a 52 y.o. female.    Vitals:  height is 5' 6" (1.676 m) and weight is 140.6 kg (310 lb) (abnormal). Her temperature is 98.5 °F (36.9 °C). Her blood pressure is 120/59 (abnormal) and her pulse is 67. Her respiration is 18 and oxygen saturation is 96%.     Chief Complaint: Chest Pain    Pt came in yesterday and tested + for covid. Pt states that she spoke to on call nurse over the phone that advised her to come in to have xray since she is coughing up tan-brownish color mucus x1 day and has some discomfort with coughing.  Pain is on lateral ribs area (both sides) and only when coughing.  Patient states that she is taking Allegra, Mucinex, Paxil bid, and Tessalon Perles and overall has been feeling much better than when symptoms 1st started.  Reports that cough has improved but the change in color had her concerned and she wanted to be checked out just to be sure.  Denies any persistent chest pain, palpitations, shortness of breath, wheezing, fevers/chills.    Chest Pain   This is a new problem. The current episode started yesterday. The onset quality is gradual. The problem occurs constantly. The problem has been gradually worsening. The pain is at a severity of 10/10. The pain is moderate. The quality of the pain is described as pressure and sharp. Associated symptoms include a cough. Pertinent negatives include no abdominal pain, back pain, claudication, diaphoresis, dizziness, exertional chest pressure, fever, headaches, hemoptysis, irregular heartbeat, leg pain, lower extremity edema, malaise/fatigue, nausea, near-syncope, numbness, orthopnea, palpitations, PND, shortness of breath, sputum production, syncope, vomiting or weakness.       Constitution: Negative for sweating and fever.   Cardiovascular: Positive for chest pain. Negative for palpitations and passing out.   Respiratory: Positive for cough. Negative for sputum production, bloody sputum and shortness " of breath.    Gastrointestinal: Negative for abdominal pain, nausea and vomiting.   Musculoskeletal: Negative for back pain.   Neurological: Negative for dizziness, headaches and numbness.       Objective:      Physical Exam   Constitutional: She appears well-developed.  Non-toxic appearance. She does not appear ill. No distress. obesity  HENT:   Head: Normocephalic and atraumatic.   Ears:   Right Ear: External ear normal.   Left Ear: External ear normal.   Nose: Nose normal.   Eyes: Conjunctivae and EOM are normal.   Neck: Neck supple.   Cardiovascular: Normal rate and regular rhythm.   Pulmonary/Chest: Effort normal and breath sounds normal. No respiratory distress. She has no wheezes.   Abdominal: Normal appearance.   Musculoskeletal: Normal range of motion.         General: Normal range of motion.   Neurological: no focal deficit. She is alert. She displays no weakness. Gait normal.   Skin: Skin is warm, dry, not diaphoretic, not pale and no rash.   Psychiatric: Her behavior is normal.       XR CHEST PA AND LATERAL    Result Date: 6/20/2022  EXAMINATION: XR CHEST PA AND LATERAL CLINICAL HISTORY: COVID-19 TECHNIQUE: PA and lateral views of the chest were performed. COMPARISON: Prior chest radiographs dating back to 2007 and most recently from January of this year.  Chest CT from November 2016. FINDINGS: Vague parenchymal densities in the left upper lung which may relate to pneumonia in this patient with reported history of COVID.  However, recommend continued follow-up to is ensure resolution.  Right lung is clear.  Cardiomediastinal silhouette is not enlarged.  The bones are intact     As above Electronically signed by: Trevon Urbina MD Date:    06/20/2022 Time:    14:27    Assessment:       1. Pneumonia due to COVID-19 virus    2. COVID-19    3. Chest discomfort          Plan:       Questionable pneumonia on CXR, however discussed with pt viral in nature given active covid infection.  Overall patient states  that she feels she is improving.  Vital signs are stable and patient is not having fever. Denies any sob or wheezing. Recommend to continue Paxlovid and cold medications as needed and continue to monitor.  Patient states that she will get pulse ox and monitor this at home as well.  Recommend close follow-up for re-evaluation if symptoms worsen.  Otherwise, f/u with PCP to ensure resolution. Patient voiced understanding.  Pneumonia due to COVID-19 virus    COVID-19  -     XR CHEST PA AND LATERAL; Future; Expected date: 06/20/2022    Chest discomfort  -     XR CHEST PA AND LATERAL; Future; Expected date: 06/20/2022

## 2022-06-22 ENCOUNTER — TELEPHONE (OUTPATIENT)
Dept: URGENT CARE | Facility: CLINIC | Age: 52
End: 2022-06-22
Payer: COMMERCIAL

## 2022-06-22 NOTE — TELEPHONE ENCOUNTER
Courtesy call to patient. States she is doing much better every day. Taking her medicine. No questions or concerns at this time

## 2022-06-27 NOTE — PROGRESS NOTES
Subjective:       Patient ID: Jenna Ovalle is a 52 y.o. Black or  female, here today for:  COVID FOLLOW-UP      HPI:    Jenna Ovalle is here for f/u for recent covid infection.  Tested positive on 6/19/2022.    Seen at [facility] on 6/19/22 for 2 day illness.  Tested pos for covid, temp at visit 101.9  Plan:  -     acetaminophen tablet 1,000 mg  -     benzonatate (TESSALON PERLES) 100 MG capsule; Take 2 capsules (200 mg total) by mouth 3 (three) times daily as needed for Cough.  Dispense: 60 capsule; Refill: 0  -     nirmatrelvir-ritonavir 150 mg x 2- 100 mg copackaged tablets (EUA); Take 3 tablets by mouth 2 (two) times daily for 5 days. Each dose contains 2 nirmatrelvir (pink tablets) and 1 ritonavir (white tablet). Take all 3 tablets together  Dispense: 30 tablet; Refill: 0    Returned to [facility] on 6/22/22 for chest complaints.  CXR showed poss pneumonia to GOLD.  Plan:  Questionable pneumonia on CXR, however discussed with pt viral in nature given active covid infection.  Overall patient states that she feels she is improving.  Vital signs are stable and patient is not having fever. Denies any sob or wheezing. Recommend to continue Paxlovid and cold medications as needed and continue to monitor.  Patient states that she will get pulse ox and monitor this at home as well.  Recommend close follow-up for re-evaluation if symptoms worsen.  Otherwise, f/u with PCP to ensure resolution. Patient voiced understanding.      Review of Systems   Constitutional: Positive for activity change and fatigue. Negative for appetite change, chills, diaphoresis, fever and unexpected weight change.        Has been working from home, today was her 1st day to go in to office and work in-person.   HENT: Positive for rhinorrhea. Negative for hearing loss and trouble swallowing.    Eyes: Negative for discharge and visual disturbance.   Respiratory: Positive for cough (not continuous, productive, wet  cough.  Sputum started off a light brown color, then tan, now yellow to clear --- much improved) and shortness of breath. Negative for chest tightness and wheezing.         Home pulse ox ~ 97 %   Cardiovascular: Negative for chest pain, palpitations and leg swelling.   Gastrointestinal: Negative for blood in stool, constipation, diarrhea and vomiting.   Endocrine: Negative for polydipsia and polyuria.   Genitourinary: Negative.  Negative for difficulty urinating, dysuria, hematuria and menstrual problem.   Musculoskeletal: Negative for arthralgias, joint swelling and neck pain.   Neurological: Positive for headaches. Negative for tremors, syncope, weakness, light-headedness and numbness.   Psychiatric/Behavioral: Negative for confusion and dysphoric mood.         Review of patient's allergies indicates:   Allergen Reactions    No known drug allergies        Patient Active Problem List   Diagnosis    Hypertension    AR (allergic rhinitis)    History of pulmonary embolism    Morbid obesity with BMI of 50.0-59.9, adult    Arthritis of back    Prediabetes    Arthritis of knee    Vitamin D deficiency    Exotropia of left eye    Thoracic radiculitis    Thoracic back pain    Uterine leiomyoma    Postmenopausal bleeding         Current Outpatient Medications:     ascorbic acid-elderberry fruit 100-50 mg Chew, Take by mouth., Disp: , Rfl:     aspirin (ECOTRIN) 81 MG EC tablet, Take 81 mg by mouth once daily., Disp: , Rfl:     benzonatate (TESSALON PERLES) 100 MG capsule, Take 2 capsules (200 mg total) by mouth 3 (three) times daily as needed for Cough., Disp: 60 capsule, Rfl: 0    carvediloL (COREG) 3.125 MG tablet, , Disp: , Rfl:     chlorhexidine (PERIDEX) 0.12 % solution, RINSE MOUTH WITH 1/2 OZ FOR 30 SECONDS TWICE DAILY, Disp: , Rfl:     ergocalciferol (ERGOCALCIFEROL) 50,000 unit Cap, TAKE 1 CAPSULE BY MOUTH EVERY 7 DAYS, Disp: 4 capsule, Rfl: 5    levocetirizine (XYZAL) 5 MG tablet, TAKE 1  TABLET(5 MG) BY MOUTH EVERY NIGHT AS NEEDED, Disp: 30 tablet, Rfl: 5    losartan (COZAAR) 50 MG tablet, TAKE 1 TABLET(50 MG) BY MOUTH EVERY DAY, Disp: 90 tablet, Rfl: 1    metFORMIN (GLUCOPHAGE-XR) 500 MG ER 24hr tablet, TAKE 3 TABLETS BY MOUTH EVERY DAY, Disp: 90 tablet, Rfl: 5    methocarbamoL (ROBAXIN) 500 MG Tab, Take 1 tablet (500 mg total) by mouth 4 (four) times daily as needed (muscle pain)., Disp: 60 tablet, Rfl: 0    OZEMPIC 1 mg/dose (4 mg/3 mL), Inject 1 mg into the skin every 7 days., Disp: , Rfl:     zinc sulfate (ZINCATE) 50 mg zinc (220 mg) capsule, , Disp: , Rfl:       Past Medical History:   Diagnosis Date    AR (allergic rhinitis)     Hypertension     Low vitamin D level 03/10/2017    Menorrhagia     Morbidly obese     PE (pulmonary embolism) 2008    elevated ESR, CRP in the past; On OCPs    Plantar fasciitis     Prediabetes     Strabismus     Left eye    Uterine fibroid        Past Surgical History:   Procedure Laterality Date    CHOLECYSTECTOMY      HYSTEROSCOPY WITH DILATION AND CURETTAGE OF UTERUS  02/24/2022    LAPAROSCOPY      MYOMECTOMY  2004    laparotomy       Family History   Problem Relation Age of Onset    Diabetes Mother     Cancer Mother         Lung cancer (nonsmoker)    Diabetes Father     Hypertension Father     Aneurysm Father         Brain aneurysm    Cancer Maternal Aunt         Breast cancer    Breast cancer Maternal Aunt     Cancer Paternal Aunt         Breast cancer    Breast cancer Paternal Aunt     Stroke Paternal Uncle     Cancer Paternal Grandfather         Pancreatic cancer    Other Sister         Prediabetes    Diabetes Brother     Heart disease Paternal Grandmother        Social History     Socioeconomic History    Marital status:     Number of children: 0   Occupational History    Occupation:  supervisor     Employer: [employer]     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         Objective:     Vitals:    06/28/22 1412   BP: 104/64   Pulse: 109   Temp: 98 °F (36.7 °C)   SpO2: 98%   Weight: (!) 138.3 kg (304 lb 12.6 oz)   Height: 5' 6" (1.676 m)       Physical Exam  Vitals reviewed.   Constitutional:       General: She is not in acute distress.  HENT:      Head: Normocephalic and atraumatic.      Right Ear: Tympanic membrane normal.      Left Ear: Tympanic membrane normal.      Nose: Mucosal edema present. No nasal tenderness or rhinorrhea.      Right Nostril: No epistaxis.      Left Nostril: No epistaxis.      Right Sinus: No maxillary sinus tenderness or frontal sinus tenderness.      Left Sinus: No maxillary sinus tenderness or frontal sinus tenderness.      Mouth/Throat:      Mouth: Mucous membranes are moist.      Pharynx: Oropharynx is clear. No pharyngeal swelling or posterior oropharyngeal erythema.   Eyes:      Pupils: Pupils are equal, round, and reactive to light.   Cardiovascular:      Rate and Rhythm: Regular rhythm. Tachycardia present.      Pulses: Normal pulses.      Heart sounds: Normal heart sounds.   Pulmonary:      Effort: Pulmonary effort is normal.      Breath sounds: Normal breath sounds.   Musculoskeletal:         General: Normal range of motion.      Cervical back: Normal range of motion and neck supple. No rigidity.      Right lower leg: No edema.      Left lower leg: No edema.   Lymphadenopathy:      Cervical: No cervical adenopathy.   Skin:     Capillary Refill: Capillary refill takes less than 2 seconds.   Neurological:      Mental Status: She is alert and oriented to person, place, and time.      Motor: No weakness.      Gait: Gait normal.   Psychiatric:    "      Mood and Affect: Mood normal.         Behavior: Behavior normal.         Thought Content: Thought content normal.         Judgment: Judgment normal.       Diagnosis/Assessment:     1. 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  - X-Ray Chest PA And Lateral; Future     Plan:     CXR pending -- recheck due to some question at  whether there was a pneumonia/infectious process going on.  Did complete a round of Paxlovid as ordered per  although her COVID risk score is a 2   Symptomatic care, rest, hydration.  States does not need any work note or documentation for work at this time.    Follow-Up:     See PCP as scheduled for 7/7/2022.  RTC as directed or prn.      YAHIR Campbell  [facility]       Patient Care Team:  Franca Richmond MD as PCP - General (Family Medicine)  Sultana Daily MD (Obstetrics and Gynecology)  Haroldo Marks MD (Cardiology)  Ankur De Leon NP as Nurse Practitioner (Family Medicine)      Future Appointments   Date Time Provider Department Center   7/7/2022  8:00 AM Franca Richmond MD [facility]       25 minutes of total time spent on the encounter, which includes face to face time and non-face to face time preparing to see the patient (eg, review of tests), obtaining and/or reviewing separately obtained history, and documenting clinical information in the electronic or other health record.  Also includes independent interpretation of results (not separately reported) and communicating results to the patient/family/caregiver, with care coordination (not separately reported).

## 2022-06-28 ENCOUNTER — OFFICE VISIT (OUTPATIENT)
Dept: FAMILY MEDICINE | Facility: CLINIC | Age: 52
End: 2022-06-28
Payer: COMMERCIAL

## 2022-06-28 ENCOUNTER — HOSPITAL ENCOUNTER (OUTPATIENT)
Dept: RADIOLOGY | Facility: HOSPITAL | Age: 52
Discharge: HOME | End: 2022-06-28
Attending: REGISTERED NURSE
Payer: COMMERCIAL

## 2022-06-28 VITALS
OXYGEN SATURATION: 98 % | BODY MASS INDEX: 47.09 KG/M2 | HEIGHT: 66 IN | TEMPERATURE: 98 F | WEIGHT: 293 LBS | SYSTOLIC BLOOD PRESSURE: 104 MMHG | DIASTOLIC BLOOD PRESSURE: 64 MMHG | HEART RATE: 109 BPM

## 2022-06-28 DIAGNOSIS — U07.1 COVID-19 VIRUS INFECTION: Primary | ICD-10-CM

## 2022-06-28 DIAGNOSIS — U07.1 COVID-19 VIRUS INFECTION: ICD-10-CM

## 2022-06-28 PROCEDURE — 1159F MED LIST DOCD IN RCRD: CPT | Mod: CPTII,S$GLB,, | Performed by: REGISTERED NURSE

## 2022-06-28 PROCEDURE — 71046 XR CHEST PA AND LATERAL: ICD-10-PCS | Mod: 26,,, | Performed by: RADIOLOGY

## 2022-06-28 PROCEDURE — 3078F PR MOST RECENT DIASTOLIC BLOOD PRESSURE < 80 MM HG: ICD-10-PCS | Mod: CPTII,S$GLB,, | Performed by: REGISTERED NURSE

## 2022-06-28 PROCEDURE — 71046 X-RAY EXAM CHEST 2 VIEWS: CPT | Mod: 26,,, | Performed by: RADIOLOGY

## 2022-06-28 PROCEDURE — 99213 OFFICE O/P EST LOW 20 MIN: CPT | Mod: S$GLB,,, | Performed by: REGISTERED NURSE

## 2022-06-28 PROCEDURE — 4010F PR ACE/ARB THEARPY RXD/TAKEN: ICD-10-PCS | Mod: CPTII,S$GLB,, | Performed by: REGISTERED NURSE

## 2022-06-28 PROCEDURE — 99999 PR PBB SHADOW E&M-EST. PATIENT-LVL IV: CPT | Mod: PBBFAC,,, | Performed by: REGISTERED NURSE

## 2022-06-28 PROCEDURE — 3078F DIAST BP <80 MM HG: CPT | Mod: CPTII,S$GLB,, | Performed by: REGISTERED NURSE

## 2022-06-28 PROCEDURE — 1159F PR MEDICATION LIST DOCUMENTED IN MEDICAL RECORD: ICD-10-PCS | Mod: CPTII,S$GLB,, | Performed by: REGISTERED NURSE

## 2022-06-28 PROCEDURE — 99213 PR OFFICE/OUTPT VISIT, EST, LEVL III, 20-29 MIN: ICD-10-PCS | Mod: S$GLB,,, | Performed by: REGISTERED NURSE

## 2022-06-28 PROCEDURE — 3074F SYST BP LT 130 MM HG: CPT | Mod: CPTII,S$GLB,, | Performed by: REGISTERED NURSE

## 2022-06-28 PROCEDURE — 71046 X-RAY EXAM CHEST 2 VIEWS: CPT | Mod: TC,FY,PO

## 2022-06-28 PROCEDURE — 4010F ACE/ARB THERAPY RXD/TAKEN: CPT | Mod: CPTII,S$GLB,, | Performed by: REGISTERED NURSE

## 2022-06-28 PROCEDURE — 99999 PR PBB SHADOW E&M-EST. PATIENT-LVL IV: ICD-10-PCS | Mod: PBBFAC,,, | Performed by: REGISTERED NURSE

## 2022-06-28 PROCEDURE — 3074F PR MOST RECENT SYSTOLIC BLOOD PRESSURE < 130 MM HG: ICD-10-PCS | Mod: CPTII,S$GLB,, | Performed by: REGISTERED NURSE

## 2022-06-28 PROCEDURE — 3008F BODY MASS INDEX DOCD: CPT | Mod: CPTII,S$GLB,, | Performed by: REGISTERED NURSE

## 2022-06-28 PROCEDURE — 3008F PR BODY MASS INDEX (BMI) DOCUMENTED: ICD-10-PCS | Mod: CPTII,S$GLB,, | Performed by: REGISTERED NURSE

## 2022-06-30 ENCOUNTER — PATIENT MESSAGE (OUTPATIENT)
Dept: FAMILY MEDICINE | Facility: CLINIC | Age: 52
End: 2022-06-30
Payer: COMMERCIAL

## 2022-07-05 ENCOUNTER — PATIENT MESSAGE (OUTPATIENT)
Dept: RESEARCH | Facility: HOSPITAL | Age: 52
End: 2022-07-05
Payer: COMMERCIAL

## 2022-07-07 ENCOUNTER — OFFICE VISIT (OUTPATIENT)
Dept: FAMILY MEDICINE | Facility: CLINIC | Age: 52
End: 2022-07-07
Payer: COMMERCIAL

## 2022-07-07 VITALS
HEART RATE: 94 BPM | OXYGEN SATURATION: 98 % | HEIGHT: 65 IN | BODY MASS INDEX: 48.82 KG/M2 | WEIGHT: 293 LBS | DIASTOLIC BLOOD PRESSURE: 78 MMHG | SYSTOLIC BLOOD PRESSURE: 100 MMHG | TEMPERATURE: 97 F

## 2022-07-07 DIAGNOSIS — F43.23 ADJUSTMENT DISORDER WITH MIXED ANXIETY AND DEPRESSED MOOD: ICD-10-CM

## 2022-07-07 DIAGNOSIS — D50.9 IRON DEFICIENCY ANEMIA, UNSPECIFIED IRON DEFICIENCY ANEMIA TYPE: ICD-10-CM

## 2022-07-07 DIAGNOSIS — E66.01 MORBID OBESITY WITH BMI OF 50.0-59.9, ADULT: ICD-10-CM

## 2022-07-07 DIAGNOSIS — R73.03 PREDIABETES: ICD-10-CM

## 2022-07-07 DIAGNOSIS — Z78.0 POSTMENOPAUSAL: ICD-10-CM

## 2022-07-07 DIAGNOSIS — Z00.00 ANNUAL PHYSICAL EXAM: Primary | ICD-10-CM

## 2022-07-07 DIAGNOSIS — E55.9 VITAMIN D DEFICIENCY: ICD-10-CM

## 2022-07-07 DIAGNOSIS — I42.9 CARDIOMYOPATHY, UNSPECIFIED TYPE: ICD-10-CM

## 2022-07-07 DIAGNOSIS — M17.10 ARTHRITIS OF KNEE: ICD-10-CM

## 2022-07-07 DIAGNOSIS — I10 ESSENTIAL HYPERTENSION: ICD-10-CM

## 2022-07-07 PROCEDURE — 99396 PREV VISIT EST AGE 40-64: CPT | Mod: S$GLB,,, | Performed by: FAMILY MEDICINE

## 2022-07-07 PROCEDURE — 4010F PR ACE/ARB THEARPY RXD/TAKEN: ICD-10-PCS | Mod: CPTII,S$GLB,, | Performed by: FAMILY MEDICINE

## 2022-07-07 PROCEDURE — 3078F DIAST BP <80 MM HG: CPT | Mod: CPTII,S$GLB,, | Performed by: FAMILY MEDICINE

## 2022-07-07 PROCEDURE — 99396 PR PREVENTIVE VISIT,EST,40-64: ICD-10-PCS | Mod: S$GLB,,, | Performed by: FAMILY MEDICINE

## 2022-07-07 PROCEDURE — 3074F SYST BP LT 130 MM HG: CPT | Mod: CPTII,S$GLB,, | Performed by: FAMILY MEDICINE

## 2022-07-07 PROCEDURE — 4010F ACE/ARB THERAPY RXD/TAKEN: CPT | Mod: CPTII,S$GLB,, | Performed by: FAMILY MEDICINE

## 2022-07-07 PROCEDURE — 1159F MED LIST DOCD IN RCRD: CPT | Mod: CPTII,S$GLB,, | Performed by: FAMILY MEDICINE

## 2022-07-07 PROCEDURE — 3008F PR BODY MASS INDEX (BMI) DOCUMENTED: ICD-10-PCS | Mod: CPTII,S$GLB,, | Performed by: FAMILY MEDICINE

## 2022-07-07 PROCEDURE — 1160F RVW MEDS BY RX/DR IN RCRD: CPT | Mod: CPTII,S$GLB,, | Performed by: FAMILY MEDICINE

## 2022-07-07 PROCEDURE — 1160F PR REVIEW ALL MEDS BY PRESCRIBER/CLIN PHARMACIST DOCUMENTED: ICD-10-PCS | Mod: CPTII,S$GLB,, | Performed by: FAMILY MEDICINE

## 2022-07-07 PROCEDURE — 1159F PR MEDICATION LIST DOCUMENTED IN MEDICAL RECORD: ICD-10-PCS | Mod: CPTII,S$GLB,, | Performed by: FAMILY MEDICINE

## 2022-07-07 PROCEDURE — 3078F PR MOST RECENT DIASTOLIC BLOOD PRESSURE < 80 MM HG: ICD-10-PCS | Mod: CPTII,S$GLB,, | Performed by: FAMILY MEDICINE

## 2022-07-07 PROCEDURE — 3074F PR MOST RECENT SYSTOLIC BLOOD PRESSURE < 130 MM HG: ICD-10-PCS | Mod: CPTII,S$GLB,, | Performed by: FAMILY MEDICINE

## 2022-07-07 PROCEDURE — 99999 PR PBB SHADOW E&M-EST. PATIENT-LVL V: ICD-10-PCS | Mod: PBBFAC,,, | Performed by: FAMILY MEDICINE

## 2022-07-07 PROCEDURE — 99999 PR PBB SHADOW E&M-EST. PATIENT-LVL V: CPT | Mod: PBBFAC,,, | Performed by: FAMILY MEDICINE

## 2022-07-07 PROCEDURE — 3008F BODY MASS INDEX DOCD: CPT | Mod: CPTII,S$GLB,, | Performed by: FAMILY MEDICINE

## 2022-07-07 NOTE — PROGRESS NOTES
HISTORY OF PRESENT ILLNESS: Ms. Ovalle comes in today fasting and with taking medications for annual wellness examination.     END OF LIFE DECISION: She has a living will and does desire life support.    Current Outpatient Medications   Medication Sig    ascorbic acid-elderberry fruit 100-50 mg Chew Take by mouth.    aspirin (ECOTRIN) 81 MG EC tablet Take 81 mg by mouth once daily.    carvediloL (COREG) 3.125 MG tablet Take 3.125 mg by mouth 2 (two) times daily.    ergocalciferol (ERGOCALCIFEROL) 50,000 unit Cap TAKE 1 CAPSULE BY MOUTH EVERY 7 DAYS    losartan (COZAAR) 50 MG tablet TAKE 1 TABLET(50 MG) BY MOUTH EVERY DAY    metFORMIN (GLUCOPHAGE-XR) 500 MG ER 24hr tablet TAKE 3 TABLETS BY MOUTH EVERY DAY    methocarbamoL (ROBAXIN) 500 MG Tab Take 1 tablet (500 mg total) by mouth 4 (four) times daily as needed (muscle pain).    OZEMPIC 1 mg/dose (4 mg/3 mL) Inject 1 mg into the skin every 7 days.    zinc sulfate (ZINCATE) 50 mg zinc (220 mg) capsule      SCREENINGS:   Cholesterol: November 30, 2021.  FFS/Colonoscopy: Never. She states she will schedule with [place].  Mammogram: December 8, 2021 with GYN Dr. Sultana hernandez. She reports Dr. Daily referred her to breast surgeon Dr. Greg Nguyen for annual breast cancer screening with ultrasound performed a few weeks.  GYN Exam: December 8, 2021 November 13, 2020 with GYN Dr. Sultana Daily.  Dexa Scan: Never.  Eye Exam: Saw Dr. Olson on April 7, 2022. She wears glasses.  PPD: Negative in the past per patient.  Immunizations: Tdap - March 4, 2015.  Gardasil - N./A.  Zostavax - N./A.  Pneumovax - In the past.  Prevnar-20 shot - Never. She desires to wait at this time.  Seasonal Flu - November 26, 2021.  Covid-19 (Moderna) vaccine series - March 12, 2021, April 9, 2021, November 5, 2021.  Shingrix - Never. Reports history of varicella and zoster (January 2022). She desires to wait until January 2023.     ROS:  GENERAL: Denies fever, chills or  unusual weight change. Appetite normal. Exercises with 12-week program with [facility] at [facility] 2 times per week, 45 minutes each time and meets with radha  once a week for 45 minutes. Reports monitors diet. Weight 148.9 kg (328 lb 4.2 oz) at June 29, 2021 visit. Reports fatigue (s/p Covid).  SKIN: Denies rashes, itching, changes in mole, color or texture of skin or easy bruising. Reports no longer wears compression stockings for varicose veins if flying.  HEAD: Denies headaches or recent head trauma.  EYES: Denies change in vision, pain, diplopia, redness or discharge. Wears glasses.  EARS: Denies ear pain, discharge, vertigo or decreased hearing.  NOSE: Denies loss of smell, epistaxis or rhinitis.  MOUTH & THROAT: Denies hoarseness or change in voice. Denies excessive gum bleeding or mouth sores. Denies sore throat.   NODES: Denies swollen glands.  CHEST: Denies ROSARIO, wheezing, cough or sputum production. Reports no longer follows with Dr. Mata, pulmonologist, for surveillance of pulmonary embolism and last saw him 2013. Reports diagnosed with possible pneumonia on June 20, 2022 followed by Covid on June 28, 2022 and treated with Paxlovid.   CARDIOVASCULAR: Denies chest pain, PND, orthopnea or reduced exercise tolerance. Denies palpitations. Previously followed by cardiologist Dr. Francisca Rust for surveillance of hypertension, cardiomegaly with last visit in March 2021; however, will see cardiologist Dr. Lockett today for 1st-time evaluation of pulmonary hypertension noted on recent chest x-ray. Reports performs home blood pressure checks once per week with level at 107/69.   ABDOMEN: Denies unusual diarrhea (as reports occasionally following gall bladder removal > 10 years ago), nausea, vomiting, abdominal pain, or blood in stool. Reports slight constipation s/p Covid.  URINARY: Denies flank pain, dysuria or hematuria.  GENITOURINARY: Denies flank pain, dysuria, frequency or  "hematuria. Reports postmenopausal but reports occasional vaginal spotting with pelvic pain and followed by GYN who advised likely needs hysterectomy but again noticed s/p Covid. Occasionally performs monthly breast self examination.   ENDOCRINE: Denies diabetes, thyroid or cholesterol problems. Reports follows with MARQUES Portillo with Dr. Jones with [facility] every 3 months with last visit in March 2022 and is treated with Ozempic.   HEME/LYMPH: Denies bleeding problems. Follows with Dr. Mathew, hematologist, with last visit on October 5, 2021 for GABRIEL, vitamin D deficiency with 1-year follow up advised. No longer follows with Dr. Pascual, hematologist, > 3 years ago for surveillance of Lupus anticoagulant disorder; she is now only on EC ASA 81 mg daily since Xarelto was discontinued April 2014.  PERIPHERAL VASCULAR:Denies claudication or cyanosis.  MUSCULOSKELETAL: Denies joint stiffness, pain or swelling except reports intermittent non-traumatic pain at both of knees but not today; follows with Dr. Minaya, orthopedist, annually for right knee gel injection with last visit in November 2021. Denies edema.   NEUROLOGIC: Denies history of seizures, tremors, paralysis, alteration of gait or coordination.  PSYCHIATRIC: Denies mood swings, depression, anxiety, homicidal or suicidal thoughts. Denies sleep problems. Follows with Alma Templeton for adjustment anxiety and depression, stable.     PE:   VS: Blood Pressure 100/78   Pulse 94   Temperature 97.2 °F (36.2 °C) (Tympanic)   Height 5' 5" (1.651 m)   Weight (Abnormal) 138.5 kg (305 lb 5.4 oz)   Last Menstrual Period  (LMP Unknown)   Oxygen Saturation 98%   Body Mass Index 50.81 kg/m²   APPEARANCE: Well nourished, well developed female, obese and pleasant, alert and oriented in no acute distress.   HEAD: Non tender. Full range of motion.  EYES: PERRL, conjunctiva pink, lids no edema. She wears glasses.  EARS: External canal patent, no " swelling or redness. TM's shiny and clear.  NOSE: Mucosa and turbinates pink, not swollen. No discharge. Non tender sinuses.  THROAT: No pharyngeal erythema or exudate. No stridor.   NECK: Supple, no mass, thyroid not enlarged.  NODES: No cervical lymph node enlargement.  CHEST: Normal respiratory effort. Lungs clear to auscultation.  CARDIOVASCULAR: Normal S1, S2. No rubs, murmurs or gallops. PMI not displaced. No carotid bruit. Pedal pulses palpable bilaterally. No edema.  ABDOMEN: Bowel sounds present. Not distended. Soft. No tenderness, masses or organomegaly.  BREAST EXAM: Not examined but deferred to GYN.  PELVIC EXAM: Not examined but deferred to GYN.  RECTAL EXAM: Not examined per patient request.  MUSCULOSKELETAL: No joint deformities or stiffness. She is ambulatory without problems.   SKIN: No rashes or suspicious lesions, normal color and turgor. Non tender slight varicose veins at lower legs.  NEUROLOGIC: Cranial Nerves: II-XII grossly intact. DTR's: Knees, Ankles 2+ and equal bilaterally. Gait & Posture: Normal gait and fine motion.     Protective Sensation (w/ 10 gram monofilament):  Right: Intact  Left: Intact    Visual Inspection:  Normal -  Bilateral    Pedal Pulses:   Right: Present  Left: Present    Posterior tibialis:   Right:Present  Left: Present     ASSESSMENT:    ICD-10-CM ICD-9-CM    1. Annual physical exam  Z00.00 V70.0 CBC Auto Differential      TSH      Urinalysis      Comprehensive Metabolic Panel      Lipid Panel      Hemoglobin A1C      Ferritin      Vitamin D   2. Essential hypertension  I10 401.9    3. Cardiomyopathy, unspecified type  I42.9 425.4    4. Prediabetes  R73.03 790.29    5. Vitamin D deficiency  E55.9 268.9    6. Iron deficiency anemia, unspecified iron deficiency anemia type  D50.9 280.9    7. Arthritis of knee  M17.10 716.96    8. Adjustment disorder with mixed anxiety and depressed mood  F43.23 309.28    9. Postmenopausal  Z78.0 V49.81    10. Morbid obesity with BMI of  50.0-59.9, adult  E66.01 278.01     Z68.43 V85.43      PLAN:  1. Age-appropriate counseling-appropriate low-sodium, low-cholesterol, low carbohydrate diet and exercise daily, monthly breast self exam, annual wellness examination.   2. Patient advised to call for results.  3. Continue current medications.  4. Add stool softener daily.  5. Keep follow up with specialists (including get with GYN regarding vaginal spotting).  6. Flu shot this fall.   7. Follow up in about 6 months (around 1/6/2023) for hypertension follow up.

## 2022-08-02 ENCOUNTER — RESEARCH ENCOUNTER (OUTPATIENT)
Dept: RESEARCH | Facility: HOSPITAL | Age: 52
End: 2022-08-02
Payer: COMMERCIAL

## 2022-08-02 NOTE — RESEARCH
Met with patient at the [facility] research exam room regarding participation in IRB protocol #2022.001, [facility] study. Pt was agreeable.    The Informed Consent Form (ICF) was reviewed with pt. The discussion included:  - participation is voluntary  - pt can change their mind about participating  - pt was informed that participation in this study would not preclude them from participating in any other research if offered  - specimens will be used by [facility] researchers in this study  - specimens collected (blood, saliva, nasal swab, urine, stool) include only those discussed with the patient at the time of consent and are indicated on the ICF   - specimens will be used in a multi-omics study regarding Covid-19 severity and biomarkers that are involved  - all specimens released to researchers will be stripped of identifiers  - no personal information will be released to any parties outside of this research study  - there will be no other physical risks outside of those involved in standard of care procedure.     Pt had a chance to ask any questions, and all questions were answered. Pt willingly and independently signed ICF.    A copy of ICF was given to pt with instructions to call with any questions that may arise or if they should change their mind regarding participation in [facility] study.    Four questionnaires and two take home sample collection kits were provided to the pt at the end of this encounter. The patient was instructed on how to collect and return these study items. Pt was then informed of a $50 stipend that would be offered to them upon return of these questionnaires and samples.    The following specimens were collected from the pt at the time of this encounter: nasal swab, saliva, and blood.

## 2022-08-04 ENCOUNTER — PATIENT MESSAGE (OUTPATIENT)
Dept: FAMILY MEDICINE | Facility: CLINIC | Age: 52
End: 2022-08-04
Payer: COMMERCIAL

## 2022-08-04 ENCOUNTER — TELEPHONE (OUTPATIENT)
Dept: FAMILY MEDICINE | Facility: CLINIC | Age: 52
End: 2022-08-04
Payer: COMMERCIAL

## 2022-08-04 NOTE — TELEPHONE ENCOUNTER
"Pt portalmesnancy TRAN    "Hi Dr. Richmond,  I just wanted to give you a heads up in case anyone reaches out to you. The [facility] insurance plans now allows a certain number of employees each year to have wt loss surgery. There's a waiting list into 2024. I've signed up. I was told my pcp may be contacted in the future,  but unsure of when. It could be months from now. Wanted to keep you in the loop.   Thanks"    "

## 2022-08-11 ENCOUNTER — PATIENT MESSAGE (OUTPATIENT)
Dept: FAMILY MEDICINE | Facility: CLINIC | Age: 52
End: 2022-08-11
Payer: COMMERCIAL

## 2022-08-16 ENCOUNTER — TELEPHONE (OUTPATIENT)
Dept: FAMILY MEDICINE | Facility: CLINIC | Age: 52
End: 2022-08-16
Payer: COMMERCIAL

## 2022-08-16 NOTE — TELEPHONE ENCOUNTER
----- Message from Frank Pate sent at 8/12/2022  4:21 PM CDT -----       Type: Patient Returning Call    Who Called: Patient   Who Left Message for Patient: NA  Does the patient know what this is regarding?: Patient is looking to receive pneumonia shot.   Would the patient rather a call back or a response via [facility]? Call   Best Call Back Number: [phone]  Additional Information: Please assist, thank you!

## 2022-10-13 ENCOUNTER — PATIENT MESSAGE (OUTPATIENT)
Dept: FAMILY MEDICINE | Facility: CLINIC | Age: 52
End: 2022-10-13
Payer: COMMERCIAL

## 2022-11-30 ENCOUNTER — OFFICE VISIT (OUTPATIENT)
Dept: URGENT CARE | Facility: CLINIC | Age: 52
End: 2022-11-30
Payer: COMMERCIAL

## 2022-11-30 VITALS
RESPIRATION RATE: 18 BRPM | HEART RATE: 68 BPM | WEIGHT: 293 LBS | OXYGEN SATURATION: 100 % | BODY MASS INDEX: 48.82 KG/M2 | SYSTOLIC BLOOD PRESSURE: 128 MMHG | HEIGHT: 65 IN | TEMPERATURE: 98 F | DIASTOLIC BLOOD PRESSURE: 75 MMHG

## 2022-11-30 DIAGNOSIS — R05.9 COUGH, UNSPECIFIED TYPE: ICD-10-CM

## 2022-11-30 DIAGNOSIS — J34.89 RHINORRHEA: ICD-10-CM

## 2022-11-30 DIAGNOSIS — J01.90 ACUTE NON-RECURRENT SINUSITIS, UNSPECIFIED LOCATION: Primary | ICD-10-CM

## 2022-11-30 PROCEDURE — 99213 OFFICE O/P EST LOW 20 MIN: CPT | Mod: S$GLB,,,

## 2022-11-30 PROCEDURE — 1160F RVW MEDS BY RX/DR IN RCRD: CPT | Mod: CPTII,S$GLB,,

## 2022-11-30 PROCEDURE — 3008F PR BODY MASS INDEX (BMI) DOCUMENTED: ICD-10-PCS | Mod: CPTII,S$GLB,,

## 2022-11-30 PROCEDURE — 3074F SYST BP LT 130 MM HG: CPT | Mod: CPTII,S$GLB,,

## 2022-11-30 PROCEDURE — 3074F PR MOST RECENT SYSTOLIC BLOOD PRESSURE < 130 MM HG: ICD-10-PCS | Mod: CPTII,S$GLB,,

## 2022-11-30 PROCEDURE — 1160F PR REVIEW ALL MEDS BY PRESCRIBER/CLIN PHARMACIST DOCUMENTED: ICD-10-PCS | Mod: CPTII,S$GLB,,

## 2022-11-30 PROCEDURE — 3078F PR MOST RECENT DIASTOLIC BLOOD PRESSURE < 80 MM HG: ICD-10-PCS | Mod: CPTII,S$GLB,,

## 2022-11-30 PROCEDURE — 99213 PR OFFICE/OUTPT VISIT, EST, LEVL III, 20-29 MIN: ICD-10-PCS | Mod: S$GLB,,,

## 2022-11-30 PROCEDURE — 1159F MED LIST DOCD IN RCRD: CPT | Mod: CPTII,S$GLB,,

## 2022-11-30 PROCEDURE — 4010F ACE/ARB THERAPY RXD/TAKEN: CPT | Mod: CPTII,S$GLB,,

## 2022-11-30 PROCEDURE — 1159F PR MEDICATION LIST DOCUMENTED IN MEDICAL RECORD: ICD-10-PCS | Mod: CPTII,S$GLB,,

## 2022-11-30 PROCEDURE — 3078F DIAST BP <80 MM HG: CPT | Mod: CPTII,S$GLB,,

## 2022-11-30 PROCEDURE — 4010F PR ACE/ARB THEARPY RXD/TAKEN: ICD-10-PCS | Mod: CPTII,S$GLB,,

## 2022-11-30 PROCEDURE — 3008F BODY MASS INDEX DOCD: CPT | Mod: CPTII,S$GLB,,

## 2022-11-30 RX ORDER — METHYLPREDNISOLONE 4 MG/1
TABLET ORAL
Qty: 21 EACH | Refills: 0 | Status: SHIPPED | OUTPATIENT
Start: 2022-11-30 | End: 2022-12-21

## 2022-11-30 RX ORDER — BENZONATATE 100 MG/1
200 CAPSULE ORAL 3 TIMES DAILY PRN
Qty: 60 CAPSULE | Refills: 0 | Status: SHIPPED | OUTPATIENT
Start: 2022-11-30 | End: 2022-12-10

## 2022-11-30 NOTE — PATIENT INSTRUCTIONS
You received a steroid prescription/shot today - Please be aware of potential side effects of steroids including elevating blood pressure, increased blood glucose levels, redness to the face, increased risk of opportunistic infections, peptic ulcer disease and GI bleeding, insomnia, tremors. If you received a shot you may also notice dimpling of the skin where the shot goes in.   Do not use steroids more than 3 times per year.   If you have diabetes, please check you blood sugar frequently.  If you have high blood pressure, please check your blood pressure frequently.     -Take tessalon perles as prescribed for cough. If this is not covered by your insurance, you can log onto [employer], search the drug name, and use free coupons to discount this medication.

## 2022-11-30 NOTE — PROGRESS NOTES
"Subjective:       Patient ID: Jenna Ovalle is a 52 y.o. female.    Vitals:  height is 5' 5" (1.651 m) and weight is 135.2 kg (298 lb). Her oral temperature is 98.4 °F (36.9 °C). Her blood pressure is 128/75 and her pulse is 68. Her respiration is 18 and oxygen saturation is 100%.     Chief Complaint: Sinus Problem (Post nasal drip)    Pt started having allergy symptoms since 11./17. She c/o post nasal drip, clear runny nose, slight cough, congestion in the morning. Pt been taking Allegra for her symptoms with minimal relief.   No fever    Sinus Problem  This is a new problem. The current episode started in the past 7 days. The problem is unchanged. There has been no fever. Her pain is at a severity of 0/10. She is experiencing no pain. Associated symptoms include congestion, coughing and sinus pressure. Past treatments include oral decongestants. The treatment provided mild relief.     HENT:  Positive for congestion and sinus pressure.    Respiratory:  Positive for cough.      Objective:      Physical Exam   Constitutional: She is oriented to person, place, and time. She appears well-developed. She is cooperative.  Non-toxic appearance. She does not appear ill. No distress.   HENT:   Head: Normocephalic and atraumatic.   Ears:   Right Ear: Hearing, tympanic membrane, external ear and ear canal normal.   Left Ear: Hearing, tympanic membrane, external ear and ear canal normal.   Nose: Rhinorrhea present. No mucosal edema or nasal deformity. No epistaxis. Right sinus exhibits no maxillary sinus tenderness and no frontal sinus tenderness. Left sinus exhibits no maxillary sinus tenderness and no frontal sinus tenderness.   Mouth/Throat: Uvula is midline, oropharynx is clear and moist and mucous membranes are normal. No trismus in the jaw. Normal dentition. No uvula swelling. No oropharyngeal exudate, posterior oropharyngeal edema or posterior oropharyngeal erythema.   Eyes: Conjunctivae and lids are normal. No " scleral icterus.   Neck: Trachea normal and phonation normal. Neck supple. No edema present. No erythema present. No neck rigidity present.   Cardiovascular: Normal rate, regular rhythm, normal heart sounds and normal pulses.   Pulmonary/Chest: Effort normal and breath sounds normal. No respiratory distress. She has no decreased breath sounds. She has no rhonchi.   Abdominal: Normal appearance.   Musculoskeletal: Normal range of motion.         General: No deformity. Normal range of motion.   Neurological: She is alert and oriented to person, place, and time. She exhibits normal muscle tone. Coordination normal.   Skin: Skin is warm, dry, intact, not diaphoretic and not pale.   Psychiatric: Her speech is normal and behavior is normal. Judgment and thought content normal.   Nursing note and vitals reviewed.      Assessment:       1. Acute non-recurrent sinusitis, unspecified location    2. Rhinorrhea    3. Cough, unspecified type          Plan:         Discussed proper use and side effects of prescribed and OTC medications recommended for symptomatic relief.   Return to clinic if symptoms worsen, persisit, or change.   Red flag signs/sx that warrants ED evaluation discussed with patient/parent who verbalized understanding      Acute non-recurrent sinusitis, unspecified location  -     methylPREDNISolone (MEDROL DOSEPACK) 4 mg tablet; use as directed  Dispense: 21 each; Refill: 0    Rhinorrhea    Cough, unspecified type  -     benzonatate (TESSALON) 100 MG capsule; Take 2 capsules (200 mg total) by mouth 3 (three) times daily as needed.  Dispense: 60 capsule; Refill: 0

## 2023-01-12 ENCOUNTER — OFFICE VISIT (OUTPATIENT)
Dept: FAMILY MEDICINE | Facility: CLINIC | Age: 53
End: 2023-01-12
Payer: COMMERCIAL

## 2023-01-12 VITALS
OXYGEN SATURATION: 97 % | TEMPERATURE: 98 F | RESPIRATION RATE: 18 BRPM | SYSTOLIC BLOOD PRESSURE: 130 MMHG | HEIGHT: 65 IN | WEIGHT: 293 LBS | HEART RATE: 73 BPM | BODY MASS INDEX: 48.82 KG/M2 | DIASTOLIC BLOOD PRESSURE: 80 MMHG

## 2023-01-12 DIAGNOSIS — M17.10 ARTHRITIS OF KNEE: ICD-10-CM

## 2023-01-12 DIAGNOSIS — Z78.0 POSTMENOPAUSAL: ICD-10-CM

## 2023-01-12 DIAGNOSIS — Z23 NEED FOR PROPHYLACTIC VACCINATION AGAINST STREPTOCOCCUS PNEUMONIAE (PNEUMOCOCCUS): ICD-10-CM

## 2023-01-12 DIAGNOSIS — R73.03 PREDIABETES: ICD-10-CM

## 2023-01-12 DIAGNOSIS — F33.2 MAJOR DEPRESSIVE DISORDER, RECURRENT SEVERE WITHOUT PSYCHOTIC FEATURES: ICD-10-CM

## 2023-01-12 DIAGNOSIS — E66.01 MORBID OBESITY WITH BMI OF 50.0-59.9, ADULT: ICD-10-CM

## 2023-01-12 DIAGNOSIS — I10 ESSENTIAL HYPERTENSION: Primary | ICD-10-CM

## 2023-01-12 DIAGNOSIS — E55.9 VITAMIN D DEFICIENCY: ICD-10-CM

## 2023-01-12 PROBLEM — I42.9 CARDIOMYOPATHY, UNSPECIFIED TYPE: Status: RESOLVED | Noted: 2022-07-07 | Resolved: 2023-01-12

## 2023-01-12 PROCEDURE — 1160F RVW MEDS BY RX/DR IN RCRD: CPT | Mod: CPTII,S$GLB,, | Performed by: FAMILY MEDICINE

## 2023-01-12 PROCEDURE — 1159F MED LIST DOCD IN RCRD: CPT | Mod: CPTII,S$GLB,, | Performed by: FAMILY MEDICINE

## 2023-01-12 PROCEDURE — 90677 PNEUMOCOCCAL CONJUGATE VACCINE 20-VALENT: ICD-10-PCS | Mod: S$GLB,,, | Performed by: FAMILY MEDICINE

## 2023-01-12 PROCEDURE — 99214 PR OFFICE/OUTPT VISIT, EST, LEVL IV, 30-39 MIN: ICD-10-PCS | Mod: 25,S$GLB,, | Performed by: FAMILY MEDICINE

## 2023-01-12 PROCEDURE — 99214 OFFICE O/P EST MOD 30 MIN: CPT | Mod: 25,S$GLB,, | Performed by: FAMILY MEDICINE

## 2023-01-12 PROCEDURE — 3079F PR MOST RECENT DIASTOLIC BLOOD PRESSURE 80-89 MM HG: ICD-10-PCS | Mod: CPTII,S$GLB,, | Performed by: FAMILY MEDICINE

## 2023-01-12 PROCEDURE — 3079F DIAST BP 80-89 MM HG: CPT | Mod: CPTII,S$GLB,, | Performed by: FAMILY MEDICINE

## 2023-01-12 PROCEDURE — 90677 PCV20 VACCINE IM: CPT | Mod: S$GLB,,, | Performed by: FAMILY MEDICINE

## 2023-01-12 PROCEDURE — 90471 IMMUNIZATION ADMIN: CPT | Mod: S$GLB,,, | Performed by: FAMILY MEDICINE

## 2023-01-12 PROCEDURE — 3075F PR MOST RECENT SYSTOLIC BLOOD PRESS GE 130-139MM HG: ICD-10-PCS | Mod: CPTII,S$GLB,, | Performed by: FAMILY MEDICINE

## 2023-01-12 PROCEDURE — 3075F SYST BP GE 130 - 139MM HG: CPT | Mod: CPTII,S$GLB,, | Performed by: FAMILY MEDICINE

## 2023-01-12 PROCEDURE — 3008F PR BODY MASS INDEX (BMI) DOCUMENTED: ICD-10-PCS | Mod: CPTII,S$GLB,, | Performed by: FAMILY MEDICINE

## 2023-01-12 PROCEDURE — 90471 PNEUMOCOCCAL CONJUGATE VACCINE 20-VALENT: ICD-10-PCS | Mod: S$GLB,,, | Performed by: FAMILY MEDICINE

## 2023-01-12 PROCEDURE — 82570 ASSAY OF URINE CREATININE: CPT | Performed by: FAMILY MEDICINE

## 2023-01-12 PROCEDURE — 99999 PR PBB SHADOW E&M-EST. PATIENT-LVL V: CPT | Mod: PBBFAC,,, | Performed by: FAMILY MEDICINE

## 2023-01-12 PROCEDURE — 99999 PR PBB SHADOW E&M-EST. PATIENT-LVL V: ICD-10-PCS | Mod: PBBFAC,,, | Performed by: FAMILY MEDICINE

## 2023-01-12 PROCEDURE — 1160F PR REVIEW ALL MEDS BY PRESCRIBER/CLIN PHARMACIST DOCUMENTED: ICD-10-PCS | Mod: CPTII,S$GLB,, | Performed by: FAMILY MEDICINE

## 2023-01-12 PROCEDURE — 3008F BODY MASS INDEX DOCD: CPT | Mod: CPTII,S$GLB,, | Performed by: FAMILY MEDICINE

## 2023-01-12 PROCEDURE — 1159F PR MEDICATION LIST DOCUMENTED IN MEDICAL RECORD: ICD-10-PCS | Mod: CPTII,S$GLB,, | Performed by: FAMILY MEDICINE

## 2023-01-12 RX ORDER — MELOXICAM 15 MG/1
15 TABLET ORAL DAILY PRN
COMMUNITY
Start: 2022-08-11 | End: 2023-03-14

## 2023-01-12 RX ORDER — ERGOCALCIFEROL 1.25 MG/1
50000 CAPSULE ORAL
Qty: 12 CAPSULE | Refills: 3 | Status: SHIPPED | OUTPATIENT
Start: 2023-01-12 | End: 2024-03-05

## 2023-01-12 RX ORDER — CETIRIZINE HYDROCHLORIDE 10 MG/1
TABLET ORAL
COMMUNITY
Start: 2022-02-22 | End: 2023-03-14

## 2023-01-12 NOTE — PROGRESS NOTES
Jenna MARIE Ovalle    Chief Complaint   Patient presents with    Follow-up    Hypertension       History of Present Illness:   Ms. Ovalle comes in today for hypertension follow-up.  She is fasting and has taken medications today.  She states she has not been performing leisure exercise but reports she has been moving to her new home and [place] over the last 2-3 weeks.  She states she has not been monitoring what she eats due to the holidays.  She states she has not been performing home blood pressure checks as she can not find her blood pressure machine.    She has chronic, occasional low back pain depending on how she sleeps.    She complains of having right hip pain with stiffness for couple of months.  She denies associated trauma.  She states when she moves around she feels better.  She also states she takes Tylenol sometimes which helps.    Otherwise, she denies having fever, chills, fatigue, appetite changes; shortness of breath, cough, wheezing; chest pain, palpitations, leg swelling; abdominal pain, nausea, vomiting, diarrhea, constipation; unusual urinary symptoms; polydipsia, polyphagia, polyuria, hot or cold intolerance; numbness, acute visual changes, headache; anxiety, depression, homicidal or suicidal thoughts.     She saw Dr. Alejandro Santos, orthopedist, on September 12, 2022 for bilateral knee arthritis.      She saw Dr. Philippe De La Torre, bariatric surgeon, on September 20, 2022 for evaluation for possible bariatric surgery.      She states she follows with Sultana Portillo with Dr. Jones [facility] every 3 months for weight management surveillance with last visit on August 16, 2022.      She saw Alma Templeton on August 17, 2022 for surveillance depression.      She saw Dr. Haroldo Marks, cardiologist, on July 29, 2022 for surveillance of nonrheumatic mitral (valve) insufficiency, morbid (severe) obesity due to excess calories, palpitations,  bradycardia, unspecified, essential (primary) hypertension, mixed hyperlipidemia, venous insufficiency (chronic) (peripheral).    She follows with Dr. Mathew, hematologist, with last visit on October 5, 2022 for GABRIEL, vitamin D deficiency with 1-year follow up advised.    She declines flu shot today.  She states she will get colonoscopy at [facility].      Labs:                      WBC                      4.81                07/02/2021                 HGB                      12.3                07/02/2021                 HCT                      38.8                07/02/2021                 PLT                      322                 07/02/2021                 CHOL                     189                 11/30/2021                 TRIG                     71                  11/30/2021                 HDL                      64                  11/30/2021                 LDLDIRECT                118                 11/30/2021                 ALT                      14                  11/30/2021                 AST                      13                  11/30/2021                 NA                       141                 11/30/2021                 K                        3.9                 11/30/2021                 CL                       105                 11/30/2021                 CREATININE               0.6                 11/30/2021                 BUN                      11                  11/30/2021                 CO2                      23                  11/30/2021                 TSH                      0.988               07/02/2021                 INR                      4.1 (H)             10/07/2010                 HGBA1C                   5.9 (H)             07/02/2021              LDLCALC                  109.8               07/02/2021                Current Outpatient Medications   Medication Sig    aspirin (ECOTRIN) 81 MG EC tablet Take 81 mg by mouth once daily.     aspirin 81 mg Cap Take by mouth.    carvediloL (COREG) 3.125 MG tablet Take 3.125 mg by mouth 2 (two) times daily.    cetirizine (ZYRTEC) 10 MG tablet Take by mouth.    ergocalciferol (ERGOCALCIFEROL) 50,000 unit Cap TAKE 1 CAPSULE BY MOUTH EVERY 7 DAYS    meloxicam (MOBIC) 15 MG tablet Take 15 mg by mouth daily as needed.    metFORMIN (GLUCOPHAGE-XR) 500 MG ER 24hr tablet TAKE 3 TABLETS BY MOUTH EVERY DAY (REPORTS ONLY TAKES 1 PILL DAILY AS ON OZEMPIC)    methocarbamoL (ROBAXIN) 500 MG Tab Take 1 tablet (500 mg total) by mouth 4 (four) times daily as needed (muscle pain).    OZEMPIC 1 mg/dose (4 mg/3 mL) Inject 1 mg into the skin every 7 days.    ascorbic acid-elderberry fruit 100-50 mg Chew Take by mouth.    ascorbic acid-multivit-min 1,000 mg PwEP Take by mouth.         Review of Systems   Constitutional:  Positive for activity change. Negative for appetite change, chills, fatigue, fever and unexpected weight change.        Weight 138.5 kg (305 lb 5.4 oz) at July 7, 2022 visit. See history of present illness.   Eyes:  Negative for visual disturbance.   Respiratory:  Negative for cough, shortness of breath and wheezing.    Cardiovascular:  Negative for chest pain, palpitations and leg swelling.        See history of present illness.   Gastrointestinal:  Negative for abdominal pain, constipation, diarrhea, nausea and vomiting.   Endocrine: Negative for cold intolerance, heat intolerance, polydipsia, polyphagia and polyuria.        See history of present illness.   Genitourinary:  Negative for difficulty urinating and menstrual problem.   Musculoskeletal:  Positive for arthralgias and back pain. Negative for joint swelling.        See history of present illness.   Neurological:  Negative for numbness and headaches.   Hematological:         See history of present illness.   Psychiatric/Behavioral:  Negative for dysphoric mood and suicidal ideas. The patient is not nervous/anxious.         Negative for homicidal  ideas.  See history of present illness.     Objective:  Physical Exam  Vitals reviewed.   Constitutional:       General: She is not in acute distress.     Appearance: Normal appearance. She is well-developed. She is obese. She is not ill-appearing, toxic-appearing or diaphoretic.      Comments: Obese and pleasant.   Neck:      Thyroid: No thyromegaly.   Cardiovascular:      Rate and Rhythm: Normal rate and regular rhythm.      Heart sounds: Normal heart sounds. No murmur heard.  Pulmonary:      Effort: Pulmonary effort is normal. No respiratory distress.      Breath sounds: Normal breath sounds. No wheezing.   Abdominal:      General: Bowel sounds are normal. There is no distension.      Palpations: Abdomen is soft. There is no mass.      Tenderness: There is no abdominal tenderness. There is no guarding or rebound.   Musculoskeletal:         General: No swelling or tenderness. Normal range of motion.      Cervical back: Normal range of motion and neck supple. No tenderness.      Comments: She is ambulatory without problems.   Lymphadenopathy:      Cervical: No cervical adenopathy.   Neurological:      General: No focal deficit present.      Mental Status: She is alert and oriented to person, place, and time.   Psychiatric:         Mood and Affect: Mood normal.         Behavior: Behavior normal.         Thought Content: Thought content normal.         Judgment: Judgment normal.       ASSESSMENT:  1. Essential hypertension    2. Prediabetes    3. Arthritis of knee    4. Vitamin D deficiency    5. Major depressive disorder, recurrent severe without psychotic features    6. Postmenopausal    7. Morbid obesity with BMI of 50.0-59.9, adult    8. Need for prophylactic vaccination against Streptococcus pneumoniae (pneumococcus)        PLAN:  Jenna was seen today for follow-up and hypertension.    Diagnoses and all orders for this visit:    Essential hypertension  -     Cancel: Comprehensive Metabolic Panel; Future  -      Microalbumin/Creatinine Ratio, Urine  -     Comprehensive Metabolic Panel; Future  -     Comprehensive Metabolic Panel    Prediabetes    Arthritis of knee    Vitamin D deficiency  -     ergocalciferol (ERGOCALCIFEROL) 50,000 unit Cap; Take 1 capsule (50,000 Units total) by mouth every 7 days.    Major depressive disorder, recurrent severe without psychotic features    Postmenopausal    Morbid obesity with BMI of 50.0-59.9, adult    Need for prophylactic vaccination against Streptococcus pneumoniae (pneumococcus)  -     (In Office Administered) Pneumococcal Conjugate Vaccine (20 Valent) (IM)      Patient advised to call for results.  Continue current medications, follow low sodium, low cholesterol, low carb diet, daily walks.  Prescription refills as noted above.  Mobility impairment renewal form completed per patient request.  Keep follow up with specialists.  Follow up in about 25 weeks (around 7/6/2023) for physical.

## 2023-01-13 ENCOUNTER — PATIENT MESSAGE (OUTPATIENT)
Dept: PRIMARY CARE CLINIC | Facility: CLINIC | Age: 53
End: 2023-01-13
Payer: COMMERCIAL

## 2023-01-13 LAB
ALBUMIN/CREAT UR: NORMAL UG/MG (ref 0–30)
CREAT UR-MCNC: 76 MG/DL (ref 15–325)
MICROALBUMIN UR DL<=1MG/L-MCNC: <5 UG/ML

## 2023-01-22 ENCOUNTER — PATIENT MESSAGE (OUTPATIENT)
Dept: FAMILY MEDICINE | Facility: CLINIC | Age: 53
End: 2023-01-22
Payer: COMMERCIAL

## 2023-01-26 ENCOUNTER — OFFICE VISIT (OUTPATIENT)
Dept: FAMILY MEDICINE | Facility: CLINIC | Age: 53
End: 2023-01-26
Payer: COMMERCIAL

## 2023-01-26 VITALS
SYSTOLIC BLOOD PRESSURE: 101 MMHG | TEMPERATURE: 99 F | WEIGHT: 293 LBS | HEIGHT: 65 IN | OXYGEN SATURATION: 97 % | HEART RATE: 87 BPM | BODY MASS INDEX: 48.82 KG/M2 | DIASTOLIC BLOOD PRESSURE: 62 MMHG

## 2023-01-26 DIAGNOSIS — J32.9 SINUSITIS, UNSPECIFIED CHRONICITY, UNSPECIFIED LOCATION: Primary | ICD-10-CM

## 2023-01-26 DIAGNOSIS — R05.9 COUGH, UNSPECIFIED TYPE: ICD-10-CM

## 2023-01-26 PROCEDURE — 3061F PR NEG MICROALBUMINURIA RESULT DOCUMENTED/REVIEW: ICD-10-PCS | Mod: CPTII,S$GLB,, | Performed by: REGISTERED NURSE

## 2023-01-26 PROCEDURE — 99213 PR OFFICE/OUTPT VISIT, EST, LEVL III, 20-29 MIN: ICD-10-PCS | Mod: S$GLB,,, | Performed by: REGISTERED NURSE

## 2023-01-26 PROCEDURE — 3078F DIAST BP <80 MM HG: CPT | Mod: CPTII,S$GLB,, | Performed by: REGISTERED NURSE

## 2023-01-26 PROCEDURE — 3061F NEG MICROALBUMINURIA REV: CPT | Mod: CPTII,S$GLB,, | Performed by: REGISTERED NURSE

## 2023-01-26 PROCEDURE — 3066F NEPHROPATHY DOC TX: CPT | Mod: CPTII,S$GLB,, | Performed by: REGISTERED NURSE

## 2023-01-26 PROCEDURE — 3074F PR MOST RECENT SYSTOLIC BLOOD PRESSURE < 130 MM HG: ICD-10-PCS | Mod: CPTII,S$GLB,, | Performed by: REGISTERED NURSE

## 2023-01-26 PROCEDURE — 1159F MED LIST DOCD IN RCRD: CPT | Mod: CPTII,S$GLB,, | Performed by: REGISTERED NURSE

## 2023-01-26 PROCEDURE — 3078F PR MOST RECENT DIASTOLIC BLOOD PRESSURE < 80 MM HG: ICD-10-PCS | Mod: CPTII,S$GLB,, | Performed by: REGISTERED NURSE

## 2023-01-26 PROCEDURE — 99213 OFFICE O/P EST LOW 20 MIN: CPT | Mod: S$GLB,,, | Performed by: REGISTERED NURSE

## 2023-01-26 PROCEDURE — 3008F PR BODY MASS INDEX (BMI) DOCUMENTED: ICD-10-PCS | Mod: CPTII,S$GLB,, | Performed by: REGISTERED NURSE

## 2023-01-26 PROCEDURE — 3074F SYST BP LT 130 MM HG: CPT | Mod: CPTII,S$GLB,, | Performed by: REGISTERED NURSE

## 2023-01-26 PROCEDURE — 99999 PR PBB SHADOW E&M-EST. PATIENT-LVL IV: CPT | Mod: PBBFAC,,, | Performed by: REGISTERED NURSE

## 2023-01-26 PROCEDURE — 3008F BODY MASS INDEX DOCD: CPT | Mod: CPTII,S$GLB,, | Performed by: REGISTERED NURSE

## 2023-01-26 PROCEDURE — 1159F PR MEDICATION LIST DOCUMENTED IN MEDICAL RECORD: ICD-10-PCS | Mod: CPTII,S$GLB,, | Performed by: REGISTERED NURSE

## 2023-01-26 PROCEDURE — 99999 PR PBB SHADOW E&M-EST. PATIENT-LVL IV: ICD-10-PCS | Mod: PBBFAC,,, | Performed by: REGISTERED NURSE

## 2023-01-26 PROCEDURE — 3066F PR DOCUMENTATION OF TREATMENT FOR NEPHROPATHY: ICD-10-PCS | Mod: CPTII,S$GLB,, | Performed by: REGISTERED NURSE

## 2023-01-26 RX ORDER — AMOXICILLIN AND CLAVULANATE POTASSIUM 875; 125 MG/1; MG/1
1 TABLET, FILM COATED ORAL 2 TIMES DAILY
Qty: 20 TABLET | Refills: 0 | Status: SHIPPED | OUTPATIENT
Start: 2023-01-26 | End: 2023-02-05

## 2023-01-26 NOTE — PROGRESS NOTES
Subjective:      Jenna Ovalle is a 52 y.o. female, here today with C/C of:  Cough and Nasal Congestion      HPI    Jenna has been ill x 2 weeks.  Reports NC, sneezing, RN and productive cough.  Reports sore throat with HA (mild) pain.  Denies sob or wheezing.  History of allergic rhinitis.      Review of Systems   Constitutional:  Negative for chills and fever.   HENT:  Positive for congestion, postnasal drip, rhinorrhea, sinus pressure, sinus pain and sneezing. Negative for ear pain, sore throat and tinnitus.    Respiratory:  Positive for cough. Negative for chest tightness, shortness of breath and wheezing.    Cardiovascular: Negative.    Gastrointestinal:  Negative for diarrhea, nausea and vomiting.   Musculoskeletal:  Negative for myalgias.   Neurological:  Positive for headaches. Negative for tremors, syncope, weakness, light-headedness and numbness.   Hematological:  Negative for adenopathy.       Review of patient's allergies indicates:   Allergen Reactions    No known drug allergies        Patient Active Problem List   Diagnosis    Essential hypertension    AR (allergic rhinitis)    History of pulmonary embolism    Morbid obesity with BMI of 50.0-59.9, adult    Arthritis of back    Prediabetes    Arthritis of knee    Vitamin D deficiency    Exotropia of left eye    Thoracic radiculitis    Thoracic back pain    Uterine leiomyoma    Postmenopausal bleeding    Adjustment disorder with mixed anxiety and depressed mood    Major depressive disorder, recurrent severe without psychotic features    Postmenopausal         Current Outpatient Medications:     ascorbic acid-elderberry fruit 100-50 mg Chew, Take by mouth., Disp: , Rfl:     ascorbic acid-multivit-min 1,000 mg PwEP, Take by mouth., Disp: , Rfl:     aspirin (ECOTRIN) 81 MG EC tablet, Take 81 mg by mouth once daily., Disp: , Rfl:     aspirin 81 mg Cap, Take by mouth., Disp: , Rfl:     carvediloL (COREG) 3.125 MG tablet, Take 3.125 mg by mouth 2  "(two) times daily., Disp: , Rfl:     cetirizine (ZYRTEC) 10 MG tablet, Take by mouth., Disp: , Rfl:     ergocalciferol (ERGOCALCIFEROL) 50,000 unit Cap, Take 1 capsule (50,000 Units total) by mouth every 7 days., Disp: 12 capsule, Rfl: 3    meloxicam (MOBIC) 15 MG tablet, Take 15 mg by mouth daily as needed., Disp: , Rfl:     metFORMIN (GLUCOPHAGE-XR) 500 MG ER 24hr tablet, TAKE 3 TABLETS BY MOUTH EVERY DAY, Disp: 90 tablet, Rfl: 5    methocarbamoL (ROBAXIN) 500 MG Tab, Take 1 tablet (500 mg total) by mouth 4 (four) times daily as needed (muscle pain)., Disp: 60 tablet, Rfl: 0    OZEMPIC 1 mg/dose (4 mg/3 mL), Inject 1 mg into the skin every 7 days., Disp: , Rfl:       Past medical, surgical, family and social histories have been reviewed today.      Objective:     Vitals:    01/26/23 1621   BP: 101/62   Pulse: 87   Temp: 98.5 °F (36.9 °C)   SpO2: 97%   Weight: (!) 139.4 kg (307 lb 5.1 oz)   Height: 5' 5" (1.651 m)   PainSc: 0-No pain       Physical Exam  Vitals reviewed.   Constitutional:       General: She is not in acute distress.  HENT:      Head: Normocephalic and atraumatic.      Right Ear: Tympanic membrane normal.      Left Ear: Tympanic membrane normal.      Nose: Mucosal edema and congestion present. No rhinorrhea.      Right Sinus: Frontal sinus tenderness present.      Left Sinus: Frontal sinus tenderness present.      Mouth/Throat:      Mouth: Mucous membranes are moist.      Pharynx: Oropharynx is clear. No pharyngeal swelling or posterior oropharyngeal erythema.   Eyes:      Pupils: Pupils are equal, round, and reactive to light.   Cardiovascular:      Rate and Rhythm: Normal rate and regular rhythm.      Pulses: Normal pulses.      Heart sounds: Normal heart sounds.   Pulmonary:      Effort: Pulmonary effort is normal.      Breath sounds: Normal breath sounds.   Musculoskeletal:         General: Normal range of motion.      Cervical back: Normal range of motion and neck supple. No rigidity.      " Right lower leg: No edema.      Left lower leg: No edema.   Lymphadenopathy:      Cervical: No cervical adenopathy.   Skin:     Capillary Refill: Capillary refill takes less than 2 seconds.   Neurological:      Mental Status: She is alert and oriented to person, place, and time.      Motor: No weakness.      Gait: Gait normal.   Psychiatric:         Mood and Affect: Mood normal.         Behavior: Behavior normal.         Thought Content: Thought content normal.         Judgment: Judgment normal.       Diagnosis/Assessment:     1. Sinusitis, unspecified chronicity, unspecified location  - amoxicillin-clavulanate 875-125mg (AUGMENTIN) 875-125 mg per tablet; Take 1 tablet by mouth 2 (two) times daily. for 10 days  Dispense: 20 tablet; Refill: 0    2. Cough, unspecified type --- Mucinex-DM 12 hr tab as directed, hydration, rest.       Plan:     Symptomatic care, rest, hydration.    Follow-up:     Contact office back in 3 to 4 days if worse or no better.  RTC as directed or on prn basis.        YAHIR Campbell  [facility]       20 minutes of total time spent on the encounter, which includes face to face time and non-face to face time preparing to see the patient.  This included obtaining and/or reviewing separately obtained history, and documenting clinical information in the electronic or other health record.   Also includes independent interpretation of results (not separately reported) and communicating results to the patient/family/caregiver, with care coordination (not separately reported).

## 2023-01-26 NOTE — PATIENT INSTRUCTIONS
Mucinex-DM tab 12 hr or extra-strength  Twice a day      Vitamin-D deficiency or insufficiency has been researched extensively and has been shown to be linked to several medical conditions and disease processes such as depression, anxiety, osteoporosis and bone disorders, bone pain, heart disease, erectile dysfunction, dementia, autoimmune disorders, problems with the immune system, chronic fatigue syndrome, diabetes, cardiovascular disease and some cancers (breast, colorectal and/or prostate).  This is not an all encompassing list.

## 2023-01-30 ENCOUNTER — TELEPHONE (OUTPATIENT)
Dept: FAMILY MEDICINE | Facility: CLINIC | Age: 53
End: 2023-01-30
Payer: COMMERCIAL

## 2023-01-30 NOTE — TELEPHONE ENCOUNTER
"Pt portal message    Lab results at desk    " Ok. If they aren't received  I know Dr. Richmond has mentioned sometimes the office has followed up with a lab once aware patient completed lab work.  If you all have any difficulties once reaching out to [facility] w/o success feel free to contact me status post.   Thankd"    Please advise    "

## 2023-02-06 NOTE — TELEPHONE ENCOUNTER
Tell pt - 1/19/23 lab results are within acceptable range except low vitamin D which is improved from last check. Thanks.

## 2023-03-07 ENCOUNTER — PATIENT MESSAGE (OUTPATIENT)
Dept: FAMILY MEDICINE | Facility: CLINIC | Age: 53
End: 2023-03-07
Payer: COMMERCIAL

## 2023-03-15 ENCOUNTER — TELEPHONE (OUTPATIENT)
Dept: FAMILY MEDICINE | Facility: CLINIC | Age: 53
End: 2023-03-15
Payer: COMMERCIAL

## 2023-03-15 NOTE — TELEPHONE ENCOUNTER
"Pt portal message    " Good afternoon,   I have just faxed the [facility] paperwork for Dr. Richmond's review. If she is in agreement,  the signed form can be faxed back to my HR dept. All HR info is listed on the cover sheet.   Thanks "      We have placed [facility] paperwork at desk for review  "

## 2023-03-21 ENCOUNTER — TELEPHONE (OUTPATIENT)
Dept: FAMILY MEDICINE | Facility: CLINIC | Age: 53
End: 2023-03-21
Payer: COMMERCIAL

## 2023-03-21 NOTE — TELEPHONE ENCOUNTER
ALMA DELIA paper work faxed. Copy made for chart and original left at  for pt to be picked up. She has been notified via [employer]

## 2023-04-19 ENCOUNTER — PATIENT MESSAGE (OUTPATIENT)
Dept: ADMINISTRATIVE | Facility: HOSPITAL | Age: 53
End: 2023-04-19
Payer: COMMERCIAL

## 2023-06-04 NOTE — PROGRESS NOTES
[facility]  MD Sagar Mcclendon, NP-C    Chief Complaint:   Jenna Ovalle is a 53 y.o. female presenting today for  6 month follow up as part of our [facility]-Risk [facility]. She is due for MRI  She reports no interval changes on her self-breast examination.     History of Present Illness:   Mrs. Ovalle first presented on February 25, 2021 due to her concerns of her future breast cancer risk. She has a family history that is worrisome. Her imaging has been normal. MyRisk Negative but with VUS on BRCA2, AXIN2, and POLE (2020). Her BO was calculated in 2020 and found to give her a 23.1 % Lifetime Risk of Breast Cancer. MD::: Sultana Daily MD.    Past Medical History:   Diagnosis Date    AR (allergic rhinitis)     BRCA negative 6/12/2023    Family history of malignant neoplasm of breast 6/12/2023    Hypertension     Low vitamin D level 03/10/2017    Menorrhagia     Morbidly obese     PE (pulmonary embolism) 2008    elevated ESR, CRP in the past; On OCPs    Plantar fasciitis     Prediabetes     Strabismus     Left eye    Uterine fibroid       Past Surgical History:   Procedure Laterality Date    CHOLECYSTECTOMY      HYSTEROSCOPY WITH DILATION AND CURETTAGE OF UTERUS  02/24/2022    LAPAROSCOPY      MYOMECTOMY  2004    laparotomy    SALPINGECTOMY Bilateral 03/23/2023    TOTAL ABDOMINAL HYSTERECTOMY  03/23/2023        Current Outpatient Medications:     aspirin (ECOTRIN) 81 MG EC tablet, Take 81 mg by mouth once daily., Disp: , Rfl:     carvediloL (COREG) 3.125 MG tablet, Take 3.125 mg by mouth 2 (two) times daily., Disp: , Rfl:     cholecalciferol, vitamin D3, 125 mcg (5,000 unit) Tab, , Disp: , Rfl:     cranberry fruit concentrate (AZO CRANBERRY ORAL), , Disp: , Rfl:     ergocalciferol (ERGOCALCIFEROL) 50,000 unit Cap, Take 1 capsule (50,000 Units total) by mouth every 7 days., Disp: 12 capsule, Rfl: 3    metFORMIN (GLUCOPHAGE-XR) 500 MG ER 24hr tablet, TAKE 3  TABLETS BY MOUTH EVERY DAY, Disp: 90 tablet, Rfl: 5    nystatin (MYCOSTATIN) powder, Apply topically 2 (two) times daily. One bottle, Disp: 60 g, Rfl: 6    OZEMPIC 2 mg/dose (8 mg/3 mL) PnIj, Inject 2 mg into the skin every 7 days., Disp: , Rfl:    Review of patient's allergies indicates:   Allergen Reactions    No known drug allergies       Social History     Tobacco Use    Smoking status: Never     Passive exposure: Never    Smokeless tobacco: Never   Substance Use Topics    Alcohol use: No     Alcohol/week: 0.0 standard drinks      Family History   Problem Relation Age of Onset    Diabetes Mother     Cancer Mother         Lung cancer (nonsmoker)    Diabetes Father     Hypertension Father     Aneurysm Father         Brain aneurysm    Cancer Maternal Aunt         Breast cancer    Breast cancer Maternal Aunt     Cancer Paternal Aunt         Breast cancer    Breast cancer Paternal Aunt     Stroke Paternal Uncle     Cancer Paternal Grandfather         Pancreatic cancer    Other Sister         Prediabetes    Diabetes Brother     Heart disease Paternal Grandmother         Review of Systems   Integumentary:  Negative for color change, rash, mole/lesion, breast mass, breast discharge and breast tenderness.   Breast: Negative for mass and tenderness     Physical Exam   HENT:   Head: Normocephalic.   Pulmonary/Chest: Right breast exhibits no inverted nipple, no mass, no nipple discharge, no skin change and no tenderness. Left breast exhibits no inverted nipple, no mass, no nipple discharge, no skin change and no tenderness. No breast swelling.   Genitourinary: No breast swelling.   Musculoskeletal: Lymphadenopathy:      Upper Body:      Right upper body: No supraclavicular or axillary adenopathy.      Left upper body: No supraclavicular or axillary adenopathy.     Neurological: She is alert.      Ultrasound: Will be performed per Dr. Quinn    Assessment/Plan  1. Family history of malignant neoplasm of breast  Assessment &  Plan:  We discussed her family history and how it could impact her own future risks.  We discussed family vs. genetic history and the importance and implications of each. All questions answered to her satisfaction.  She knows that as additional family members are diagnosed - she will need to let us know as this may change follow up and imaging recommendations. She will continue to be followed every 6 months in our C. We reviewed our findings today and her questions were answered.  She understands that her imaging and exams have remained stable (and show nothing concerning).  She is comfortable being followed in a conservative fashion.      She understands the importance of monthly self-breast examination and knows to report any and all changes as they occur.            2. BRCA negative  Assessment & Plan:  We discussed the implications of her Negative MyRisk Gene Test at length.  She knows that testing in the future may be necessary as this test will change as more genetic mutations are identified.  She understands that even though she is gene negative - that she can still develop a breast cancer.               Medical Decision Making:  It is my impression that this patient suffers all conditions contained in this medical document.  Each of these conditions did affect our plan of care and my medical decision making today.  It is my opinion that the medical decision making concerning this patient was of moderate difficulty based on the aforementioned conditions.  Any further recommendations will be communicated to the patient by me.  I have reviewed and verified her allergies, list of medications, medical and surgical histories, social history, and a pertinent review of symptoms.      Follow up:  6 months and prn    For:  [facility]AT (S) at [facility]

## 2023-06-12 ENCOUNTER — PATIENT MESSAGE (OUTPATIENT)
Dept: SURGERY | Facility: CLINIC | Age: 53
End: 2023-06-12
Payer: COMMERCIAL

## 2023-06-12 PROBLEM — Z80.3 FAMILY HISTORY OF MALIGNANT NEOPLASM OF BREAST: Status: ACTIVE | Noted: 2023-06-12

## 2023-06-12 PROBLEM — Z13.71 BRCA NEGATIVE: Status: ACTIVE | Noted: 2023-06-12

## 2023-06-13 ENCOUNTER — OFFICE VISIT (OUTPATIENT)
Dept: SURGERY | Facility: CLINIC | Age: 53
End: 2023-06-13
Payer: COMMERCIAL

## 2023-06-13 DIAGNOSIS — Z80.3 FAMILY HISTORY OF MALIGNANT NEOPLASM OF BREAST: ICD-10-CM

## 2023-06-13 DIAGNOSIS — Z13.71 BRCA NEGATIVE: ICD-10-CM

## 2023-06-13 PROCEDURE — 3061F PR NEG MICROALBUMINURIA RESULT DOCUMENTED/REVIEW: ICD-10-PCS | Mod: CPTII,S$GLB,, | Performed by: NURSE PRACTITIONER

## 2023-06-13 PROCEDURE — 99213 PR OFFICE/OUTPT VISIT, EST, LEVL III, 20-29 MIN: ICD-10-PCS | Mod: S$GLB,,, | Performed by: NURSE PRACTITIONER

## 2023-06-13 PROCEDURE — 3066F PR DOCUMENTATION OF TREATMENT FOR NEPHROPATHY: ICD-10-PCS | Mod: CPTII,S$GLB,, | Performed by: NURSE PRACTITIONER

## 2023-06-13 PROCEDURE — 1160F RVW MEDS BY RX/DR IN RCRD: CPT | Mod: CPTII,S$GLB,, | Performed by: NURSE PRACTITIONER

## 2023-06-13 PROCEDURE — 3066F NEPHROPATHY DOC TX: CPT | Mod: CPTII,S$GLB,, | Performed by: NURSE PRACTITIONER

## 2023-06-13 PROCEDURE — 99213 OFFICE O/P EST LOW 20 MIN: CPT | Mod: S$GLB,,, | Performed by: NURSE PRACTITIONER

## 2023-06-13 PROCEDURE — 1160F PR REVIEW ALL MEDS BY PRESCRIBER/CLIN PHARMACIST DOCUMENTED: ICD-10-PCS | Mod: CPTII,S$GLB,, | Performed by: NURSE PRACTITIONER

## 2023-06-13 PROCEDURE — 1159F PR MEDICATION LIST DOCUMENTED IN MEDICAL RECORD: ICD-10-PCS | Mod: CPTII,S$GLB,, | Performed by: NURSE PRACTITIONER

## 2023-06-13 PROCEDURE — 1159F MED LIST DOCD IN RCRD: CPT | Mod: CPTII,S$GLB,, | Performed by: NURSE PRACTITIONER

## 2023-06-13 PROCEDURE — 3061F NEG MICROALBUMINURIA REV: CPT | Mod: CPTII,S$GLB,, | Performed by: NURSE PRACTITIONER

## 2023-06-13 NOTE — ASSESSMENT & PLAN NOTE
We discussed her family history and how it could impact her own future risks.  We discussed family vs. genetic history and the importance and implications of each. All questions answered to her satisfaction.  She knows that as additional family members are diagnosed - she will need to let us know as this may change follow up and imaging recommendations. She will continue to be followed every 6 months in our [facility]. We reviewed our findings today and her questions were answered.  She understands that her imaging and exams have remained stable (and show nothing concerning).  She is comfortable being followed in a conservative fashion.      She understands the importance of monthly self-breast examination and knows to report any and all changes as they occur.

## 2023-06-13 NOTE — ASSESSMENT & PLAN NOTE
We discussed the implications of her Negative MyRisk Gene Test at length.  She knows that testing in the future may be necessary as this test will change as more genetic mutations are identified.  She understands that even though she is gene negative - that she can still develop a breast cancer.

## 2023-06-14 NOTE — TELEPHONE ENCOUNTER
Care Due:                  Date            Visit Type   Department     Provider  --------------------------------------------------------------------------------                                EP -                              PRIMARY      [facility]  Last Visit: 01-      CARE ([facility])   LORAINE Richmond                              EP -                              PRIMARY      [facility]  Next Visit: 07-      CARE ([facility])   MEDICINE       Franca Richmond                                                            Last  Test          Frequency    Reason                     Performed    Due Date  --------------------------------------------------------------------------------    Cr..........  12 months..  metFORMIN................  11- 11-    HBA1C.......  6 months...  metFORMIN................  07- 12-    [facility] Embedded Care Due Messages. Reference number: [id number].   6/14/2023 3:29:02 PM CDT

## 2023-06-15 RX ORDER — METFORMIN HYDROCHLORIDE 500 MG/1
TABLET, EXTENDED RELEASE ORAL
Qty: 90 TABLET | Refills: 5 | Status: SHIPPED | OUTPATIENT
Start: 2023-06-15

## 2023-06-15 NOTE — TELEPHONE ENCOUNTER
Refill Routing Note   Medication(s) are not appropriate for processing by [facility] for the following reason(s):      Required labs outdated    [facility] action(s):  Defer None identified            Appointments  past 12m or future 3m with PCP    Date Provider   Last Visit   1/12/2023 Franca Richmond MD   Next Visit   7/6/2023 Franca Richmond MD   ED visits in past 90 days: 0        Note composed:10:48 PM 06/14/2023

## 2023-06-20 ENCOUNTER — OFFICE VISIT (OUTPATIENT)
Dept: FAMILY MEDICINE | Facility: CLINIC | Age: 53
End: 2023-06-20
Payer: COMMERCIAL

## 2023-06-20 ENCOUNTER — HOSPITAL ENCOUNTER (OUTPATIENT)
Dept: RADIOLOGY | Facility: HOSPITAL | Age: 53
Discharge: HOME | End: 2023-06-20
Attending: REGISTERED NURSE
Payer: COMMERCIAL

## 2023-06-20 VITALS
DIASTOLIC BLOOD PRESSURE: 78 MMHG | HEART RATE: 92 BPM | HEIGHT: 65 IN | WEIGHT: 293 LBS | BODY MASS INDEX: 48.82 KG/M2 | OXYGEN SATURATION: 99 % | TEMPERATURE: 100 F | SYSTOLIC BLOOD PRESSURE: 106 MMHG

## 2023-06-20 DIAGNOSIS — R05.8 DRY COUGH: ICD-10-CM

## 2023-06-20 DIAGNOSIS — R50.9 FEVER, UNSPECIFIED FEVER CAUSE: ICD-10-CM

## 2023-06-20 DIAGNOSIS — R09.89 SINUS SYMPTOM: Primary | ICD-10-CM

## 2023-06-20 LAB — SARS-COV-2 RNA RESP QL NAA+PROBE: NOT DETECTED

## 2023-06-20 PROCEDURE — 99999 PR PBB SHADOW E&M-EST. PATIENT-LVL III: ICD-10-PCS | Mod: PBBFAC,,, | Performed by: REGISTERED NURSE

## 2023-06-20 PROCEDURE — 71046 X-RAY EXAM CHEST 2 VIEWS: CPT | Mod: TC,FY,PO

## 2023-06-20 PROCEDURE — 71046 XR CHEST PA AND LATERAL: ICD-10-PCS | Mod: 26,,, | Performed by: RADIOLOGY

## 2023-06-20 PROCEDURE — 3066F PR DOCUMENTATION OF TREATMENT FOR NEPHROPATHY: ICD-10-PCS | Mod: CPTII,S$GLB,, | Performed by: REGISTERED NURSE

## 2023-06-20 PROCEDURE — 3078F PR MOST RECENT DIASTOLIC BLOOD PRESSURE < 80 MM HG: ICD-10-PCS | Mod: CPTII,S$GLB,, | Performed by: REGISTERED NURSE

## 2023-06-20 PROCEDURE — 99999 PR PBB SHADOW E&M-EST. PATIENT-LVL III: CPT | Mod: PBBFAC,,, | Performed by: REGISTERED NURSE

## 2023-06-20 PROCEDURE — 3066F NEPHROPATHY DOC TX: CPT | Mod: CPTII,S$GLB,, | Performed by: REGISTERED NURSE

## 2023-06-20 PROCEDURE — 3008F BODY MASS INDEX DOCD: CPT | Mod: CPTII,S$GLB,, | Performed by: REGISTERED NURSE

## 2023-06-20 PROCEDURE — 3061F NEG MICROALBUMINURIA REV: CPT | Mod: CPTII,S$GLB,, | Performed by: REGISTERED NURSE

## 2023-06-20 PROCEDURE — 96372 THER/PROPH/DIAG INJ SC/IM: CPT | Mod: S$GLB,,, | Performed by: REGISTERED NURSE

## 2023-06-20 PROCEDURE — 3074F SYST BP LT 130 MM HG: CPT | Mod: CPTII,S$GLB,, | Performed by: REGISTERED NURSE

## 2023-06-20 PROCEDURE — 3074F PR MOST RECENT SYSTOLIC BLOOD PRESSURE < 130 MM HG: ICD-10-PCS | Mod: CPTII,S$GLB,, | Performed by: REGISTERED NURSE

## 2023-06-20 PROCEDURE — 96372 PR INJECTION,THERAP/PROPH/DIAG2ST, IM OR SUBCUT: ICD-10-PCS | Mod: S$GLB,,, | Performed by: REGISTERED NURSE

## 2023-06-20 PROCEDURE — 3008F PR BODY MASS INDEX (BMI) DOCUMENTED: ICD-10-PCS | Mod: CPTII,S$GLB,, | Performed by: REGISTERED NURSE

## 2023-06-20 PROCEDURE — 71046 X-RAY EXAM CHEST 2 VIEWS: CPT | Mod: 26,,, | Performed by: RADIOLOGY

## 2023-06-20 PROCEDURE — 99214 OFFICE O/P EST MOD 30 MIN: CPT | Mod: 25,S$GLB,, | Performed by: REGISTERED NURSE

## 2023-06-20 PROCEDURE — 99214 PR OFFICE/OUTPT VISIT, EST, LEVL IV, 30-39 MIN: ICD-10-PCS | Mod: 25,S$GLB,, | Performed by: REGISTERED NURSE

## 2023-06-20 PROCEDURE — 87635 SARS-COV-2 COVID-19 AMP PRB: CPT | Performed by: REGISTERED NURSE

## 2023-06-20 PROCEDURE — 3061F PR NEG MICROALBUMINURIA RESULT DOCUMENTED/REVIEW: ICD-10-PCS | Mod: CPTII,S$GLB,, | Performed by: REGISTERED NURSE

## 2023-06-20 PROCEDURE — 3078F DIAST BP <80 MM HG: CPT | Mod: CPTII,S$GLB,, | Performed by: REGISTERED NURSE

## 2023-06-20 RX ORDER — DEXAMETHASONE SODIUM PHOSPHATE 4 MG/ML
8 INJECTION, SOLUTION INTRA-ARTICULAR; INTRALESIONAL; INTRAMUSCULAR; INTRAVENOUS; SOFT TISSUE
Status: COMPLETED | OUTPATIENT
Start: 2023-06-20 | End: 2023-06-20

## 2023-06-20 RX ADMIN — DEXAMETHASONE SODIUM PHOSPHATE 8 MG: 4 INJECTION, SOLUTION INTRA-ARTICULAR; INTRALESIONAL; INTRAMUSCULAR; INTRAVENOUS; SOFT TISSUE at 04:06

## 2023-06-20 NOTE — PROGRESS NOTES
SUBJECTIVE:     Jenna Ovalle is a 53 y.o. female is here today with c/o:    Sinusitis/sore throat      HPI:    Sore Throat   This is a recurrent problem. The current episode started in the past 7 days. The problem has been gradually worsening. The pain is worse on the right side. There has been no fever. The pain is at a severity of 8/10. The pain is moderate. Associated symptoms include congestion, coughing (dry), ear pain and headaches. Pertinent negatives include no abdominal pain, diarrhea, drooling, ear discharge, hoarse voice, plugged ear sensation, neck pain, shortness of breath, stridor, swollen glands, trouble swallowing or vomiting. She has had no exposure to strep or mono. She has tried NSAIDs and oral narcotic analgesics for the symptoms. The treatment provided mild relief.       REVIEW OF SYSTEMS:    Review of Systems   Constitutional:  Positive for chills, diaphoresis, fever and malaise/fatigue.   HENT:  Positive for congestion, ear pain, sinus pain, sore throat and tinnitus. Negative for drooling, ear discharge, hearing loss, hoarse voice, nosebleeds and trouble swallowing.    Eyes:  Positive for pain.   Respiratory:  Positive for cough (dry). Negative for hemoptysis, sputum production, shortness of breath, wheezing and stridor.    Cardiovascular: Negative.    Gastrointestinal:  Negative for abdominal pain, diarrhea, nausea and vomiting.   Musculoskeletal:  Positive for myalgias. Negative for neck pain.   Neurological:  Positive for headaches. Negative for dizziness, tingling, tremors, seizures and loss of consciousness.       CURRENT ALLERGIES:    Review of patient's allergies indicates:   Allergen Reactions    No known drug allergies        CURRENT PROBLEM LIST:    Patient Active Problem List   Diagnosis    Essential hypertension    AR (allergic rhinitis)    History of pulmonary embolism    Morbid obesity with BMI of 50.0-59.9, adult    Arthritis of back    Prediabetes    Arthritis of knee  "   Vitamin D deficiency    Exotropia of left eye    Thoracic radiculitis    Thoracic back pain    Uterine leiomyoma    Postmenopausal bleeding    Adjustment disorder with mixed anxiety and depressed mood    Major depressive disorder, recurrent severe without psychotic features    Postmenopausal    Family history of malignant neoplasm of breast    BRCA negative       CURRENT MEDICATION LIST:      Current Outpatient Medications:     aspirin (ECOTRIN) 81 MG EC tablet, Take 81 mg by mouth once daily., Disp: , Rfl:     carvediloL (COREG) 3.125 MG tablet, Take 3.125 mg by mouth 2 (two) times daily., Disp: , Rfl:     cholecalciferol, vitamin D3, 125 mcg (5,000 unit) Tab, , Disp: , Rfl:     cranberry fruit concentrate (AZO CRANBERRY ORAL), , Disp: , Rfl:     ergocalciferol (ERGOCALCIFEROL) 50,000 unit Cap, Take 1 capsule (50,000 Units total) by mouth every 7 days., Disp: 12 capsule, Rfl: 3    metFORMIN (GLUCOPHAGE-XR) 500 MG ER 24hr tablet, TAKE 3 TABLETS BY MOUTH EVERY DAY, Disp: 90 tablet, Rfl: 5    nystatin (MYCOSTATIN) powder, Apply topically 2 (two) times daily. One bottle, Disp: 60 g, Rfl: 6    OZEMPIC 2 mg/dose (8 mg/3 mL) PnIj, Inject 2 mg into the skin every 7 days., Disp: , Rfl:       HISTORY:    Past medical, surgical, family and social histories have been reviewed today.      OBJECTIVE:     Vitals:    06/20/23 1531   BP: 106/78   Pulse: 92   Temp: 100.1 °F (37.8 °C)   SpO2: 99%   Weight: (!) 140 kg (308 lb 10.3 oz)   Height: 5' 5" (1.651 m)   PainSc:   8   PainLoc: Throat       Physical Exam  Vitals reviewed.   Constitutional:       General: She is not in acute distress.  HENT:      Head: Normocephalic and atraumatic.      Right Ear: Tympanic membrane normal.      Left Ear: Tympanic membrane normal.      Nose: Congestion present. No rhinorrhea.      Right Sinus: No maxillary sinus tenderness or frontal sinus tenderness.      Left Sinus: No maxillary sinus tenderness or frontal sinus tenderness.      " Mouth/Throat:      Mouth: Mucous membranes are moist.      Pharynx: Oropharynx is clear. No pharyngeal swelling or posterior oropharyngeal erythema.   Eyes:      Pupils: Pupils are equal, round, and reactive to light.   Cardiovascular:      Rate and Rhythm: Normal rate and regular rhythm.      Pulses: Normal pulses.      Heart sounds: Normal heart sounds.   Pulmonary:      Effort: Pulmonary effort is normal.      Breath sounds: Normal breath sounds.   Musculoskeletal:         General: Normal range of motion.      Cervical back: Normal range of motion and neck supple. No rigidity.      Right lower leg: No edema.      Left lower leg: No edema.   Lymphadenopathy:      Cervical: No cervical adenopathy.   Skin:     Capillary Refill: Capillary refill takes less than 2 seconds.   Neurological:      Mental Status: She is alert and oriented to person, place, and time.      Motor: No weakness.      Gait: Gait normal.   Psychiatric:         Mood and Affect: Mood normal.         Behavior: Behavior normal.         Thought Content: Thought content normal.         Judgment: Judgment normal.       ASSESSMENT:     1. Sinus symptom  dexAMETHasone injection 8 mg    COVID-19 Routine Screening      2. Fever, unspecified fever cause  X-Ray Chest PA And Lateral    dexAMETHasone injection 8 mg    COVID-19 Routine Screening      3. Dry cough  X-Ray Chest PA And Lateral    dexAMETHasone injection 8 mg    COVID-19 Routine Screening          PLAN:     Pending lab and XR.  Symptomatic care, rest, hydration.  RTC as directed and/or prn.        YAHIR Campbell  [facility]       30 minutes of total time spent on the encounter, which includes face to face time and non-face to face time preparing to see the patient.  This includes obtaining and/or reviewing separately obtained history, performing a medically appropriate examination and/or evaluation, and counseling and educating the patient/family/caregiver.   Includes documenting clinical information in the electronic or other health record, independently interpreting results (not separately reported) and communicating results to the patient/family/caregiver, with care coordination (not separately reported).  Medications, tests and/or procedures ordered as necessary along with referring and communicating with other health professionals (when not separately reported).

## 2023-06-21 ENCOUNTER — TELEPHONE (OUTPATIENT)
Dept: FAMILY MEDICINE | Facility: CLINIC | Age: 53
End: 2023-06-21
Payer: COMMERCIAL

## 2023-06-21 ENCOUNTER — PATIENT MESSAGE (OUTPATIENT)
Dept: FAMILY MEDICINE | Facility: CLINIC | Age: 53
End: 2023-06-21
Payer: COMMERCIAL

## 2023-06-21 NOTE — TELEPHONE ENCOUNTER
----- Message from Rose Neal sent at 6/21/2023  1:23 PM CDT -----  Regarding: pt call back  Name of Who is Calling: JAYLA DIAZ [[id number]]        What is the request in detail: pt would like a call back to discuss getting an excuse for Thursday and Friday of this week, did send a msg through the portal that explains further, please advise.         Can the clinic reply by [facility]: no           What [facility] to [facility] Back [facility]: [phone]

## 2023-06-22 ENCOUNTER — TELEPHONE (OUTPATIENT)
Dept: FAMILY MEDICINE | Facility: CLINIC | Age: 53
End: 2023-06-22
Payer: COMMERCIAL

## 2023-06-22 NOTE — TELEPHONE ENCOUNTER
----- Message from Nay Lorenzo sent at 6/22/2023 11:20 AM CDT -----  Contact: Jenna  Type:  Needs Medical Advice    Who Called: Jenna  Symptoms (please be specific): Throat pain   How long has patient had these symptoms: 6/19/2023  Pharmacy name and phone #:    [id number] - [place] - [address] AT [facility]  [address] [address]  Phone: [phone] Fax: [phone]   Would the patient rather a call back or a response via [facility]? call  Best Call Back Number: [phone]   Additional Information: Patient is experiencing throat pain and requesting medication. Please call patient back to assist.

## 2023-06-22 NOTE — TELEPHONE ENCOUNTER
Pt complains of throat pain. She was just in on Tuesday to see np alvin for symptoms. She received a steroid shot and rotating motrin and tylenol. Now her throat is hurting when she does not take meds.     Please advise

## 2023-06-23 ENCOUNTER — OFFICE VISIT (OUTPATIENT)
Dept: URGENT CARE | Facility: CLINIC | Age: 53
End: 2023-06-23
Payer: COMMERCIAL

## 2023-06-23 VITALS
RESPIRATION RATE: 18 BRPM | OXYGEN SATURATION: 97 % | HEIGHT: 65 IN | TEMPERATURE: 98 F | HEART RATE: 64 BPM | SYSTOLIC BLOOD PRESSURE: 125 MMHG | WEIGHT: 293 LBS | BODY MASS INDEX: 48.82 KG/M2 | DIASTOLIC BLOOD PRESSURE: 71 MMHG

## 2023-06-23 DIAGNOSIS — J02.9 SORE THROAT: ICD-10-CM

## 2023-06-23 DIAGNOSIS — J02.0 STREP THROAT: Primary | ICD-10-CM

## 2023-06-23 DIAGNOSIS — R09.82 POST-NASAL DRIP: ICD-10-CM

## 2023-06-23 DIAGNOSIS — R05.1 ACUTE COUGH: ICD-10-CM

## 2023-06-23 LAB
CTP QC/QA: YES
MOLECULAR STREP A: POSITIVE

## 2023-06-23 PROCEDURE — 87651 STREP A DNA AMP PROBE: CPT | Mod: QW,S$GLB,, | Performed by: NURSE PRACTITIONER

## 2023-06-23 PROCEDURE — 99213 PR OFFICE/OUTPT VISIT, EST, LEVL III, 20-29 MIN: ICD-10-PCS | Mod: S$GLB,,, | Performed by: NURSE PRACTITIONER

## 2023-06-23 PROCEDURE — 87651 POCT STREP A MOLECULAR: ICD-10-PCS | Mod: QW,S$GLB,, | Performed by: NURSE PRACTITIONER

## 2023-06-23 PROCEDURE — 99213 OFFICE O/P EST LOW 20 MIN: CPT | Mod: S$GLB,,, | Performed by: NURSE PRACTITIONER

## 2023-06-23 RX ORDER — PENICILLIN V POTASSIUM 500 MG/1
500 TABLET, FILM COATED ORAL 2 TIMES DAILY
Qty: 20 TABLET | Refills: 0 | Status: SHIPPED | OUTPATIENT
Start: 2023-06-23 | End: 2023-07-03

## 2023-06-23 NOTE — LETTER
June 23, 2023      [facility]  [address], [address]  [place] [address]  Phone: [phone]  Fax: [phone]       Patient: Jenna Ovalle   YOB: 1970  Date of Visit: 06/23/2023    To Whom It May Concern:    Saul Ovalle  was at [facility] on 06/23/2023. The patient may return to work/school on 06/26/2023 with no restrictions. If you have any questions or concerns, or if I can be of further assistance, please do not hesitate to contact me.    Sincerely,      Vira Snatos, NP

## 2023-06-23 NOTE — PATIENT INSTRUCTIONS
Rest  Hydration/increase fluids  Claritin D OTC as directed for post nasal drip/nasal congestion  Gargle with Magic mouthwash every 4 hours as needed for sore throat  Complete antibiotic course as directed  Discard toothbrush on day 3 of treatment  Typical course and duration of illness discussed  Signs and symptoms of worsening discussed  Follow up as needed with worsening

## 2023-06-23 NOTE — PROGRESS NOTES
"Subjective:      Patient ID: Jenna Ovalle is a 53 y.o. female.    Vitals:  height is 5' 5" (1.651 m) and weight is 139.7 kg (308 lb) (abnormal). Her tympanic temperature is 97.5 °F (36.4 °C). Her blood pressure is 125/71 and her pulse is 64. Her respiration is 18 and oxygen saturation is 97%.     Chief Complaint: Sore Throat    53 year old female presents for evaluation of recurrent sxs of sore throat, and cough which started around 1 week ago. Patient was seen by PCP on 06/20 for symptoms, (-) Covid/CXR, DX viral illness TX steroid shot. Cough improved since onset, lingering sore throat. Alternating Motrin and Tylenol with temporary relief. Mucinex DM      Sinus Problem  This is a recurrent problem. The current episode started 1 to 4 weeks ago. There has been no fever. Her pain is at a severity of 8/10. Associated symptoms include coughing and a sore throat. Pertinent negatives include no chills, congestion, diaphoresis, ear pain, headaches, hoarse voice, sinus pressure or sneezing. Past treatments include acetaminophen and oral decongestants. The treatment provided no relief.     Constitution: Negative. Negative for chills, sweating and fever.   HENT:  Positive for postnasal drip and sore throat. Negative for ear pain, congestion and sinus pressure.    Neck: neck negative.   Cardiovascular: Negative.    Eyes: Negative.    Respiratory:  Positive for cough.    Gastrointestinal: Negative.    Endocrine: negative.   Genitourinary: Negative.    Musculoskeletal: Negative.    Skin: Negative.    Allergic/Immunologic: Negative.  Negative for sneezing.   Neurological: Negative.  Negative for headaches.   Hematologic/Lymphatic: Negative.    Psychiatric/Behavioral: Negative.      Objective:     Physical Exam   Constitutional: She is oriented to person, place, and time. She is cooperative.  Non-toxic appearance. She does not appear ill. No distress. obesityawake  HENT:   Head: Normocephalic and atraumatic.   Ears: "   Right Ear: Tympanic membrane, external ear and ear canal normal. No cerumen not present. Tympanic membrane is not injected, not scarred, not perforated, not erythematous, not retracted and not bulging. impacted cerumen  Left Ear: Tympanic membrane, external ear and ear canal normal. No cerumen not present. Tympanic membrane is not injected, not scarred, not perforated, not erythematous, not retracted and not bulging. impacted cerumen  Nose: Mucosal edema present. No rhinorrhea or purulent discharge. Right sinus exhibits no maxillary sinus tenderness and no frontal sinus tenderness. Left sinus exhibits no maxillary sinus tenderness and no frontal sinus tenderness.   Mouth/Throat: Uvula is midline and mucous membranes are normal. Posterior oropharyngeal erythema and cobblestoning present. No oropharyngeal exudate, posterior oropharyngeal edema or tonsillar abscesses. Tonsils are 1+ on the right. Tonsils are 1+ on the left. No tonsillar exudate.   Eyes: Conjunctivae are normal. Pupils are equal, round, and reactive to light. Right eye exhibits no discharge. Left eye exhibits no discharge. No scleral icterus. Extraocular movement intact   Neck: Neck supple.   Cardiovascular: Normal rate, regular rhythm and normal heart sounds.   Pulmonary/Chest: Effort normal and breath sounds normal. No stridor. No respiratory distress. She has no decreased breath sounds. She has no wheezes. She has no rhonchi. She has no rales. She exhibits no tenderness.   Abdominal: Normal appearance.   Musculoskeletal: Normal range of motion.         General: Normal range of motion.   Neurological: no focal deficit. She is alert and oriented to person, place, and time.   Skin: Skin is warm, dry and not diaphoretic.   Psychiatric: Her behavior is normal. Mood, judgment and thought content normal.   Nursing note and vitals reviewed.  Results for orders placed or performed in visit on 06/23/23   POCT Strep A, Molecular   Result Value Ref Range     Molecular Strep A, POC Positive (A) Negative     Acceptable Yes        Assessment:     1. Strep throat    2. Sore throat    3. Post-nasal drip    4. Acute cough        Plan:     Patient presents for evaluation of lingering sore throat. Decision to perform strep swab to evaluate for strep tonsillitis. (+) result discussed with patient. Plan is to treat bacterial infection, manage symptoms, and prevent worsening. Discussed with patient who verbalizes understanding.      Strep throat  -     penicillin v potassium (VEETID) 500 MG tablet; Take 1 tablet (500 mg total) by mouth 2 (two) times a day. for 10 days  Dispense: 20 tablet; Refill: 0  -     diphenhydrAMINE-aluminum-magnesium hydroxide-simethicone-LIDOcaine HCl 2%; Swish and spit 15 mLs every 4 (four) hours as needed (sore throat).  Dispense: 360 each; Refill: 0    Sore throat  -     POCT Strep A, Molecular  -     diphenhydrAMINE-aluminum-magnesium hydroxide-simethicone-LIDOcaine HCl 2%; Swish and spit 15 mLs every 4 (four) hours as needed (sore throat).  Dispense: 360 each; Refill: 0    Post-nasal drip    Acute cough             Patient Instructions   Rest  Hydration/increase fluids  Claritin D OTC as directed for post nasal drip/nasal congestion  Gargle with Magic mouthwash every 4 hours as needed for sore throat  Complete antibiotic course as directed  Discard toothbrush on day 3 of treatment  Typical course and duration of illness discussed  Signs and symptoms of worsening discussed  Follow up as needed with worsening

## 2023-06-23 NOTE — TELEPHONE ENCOUNTER
I see pt was seen by Urgent Care provider today.     However, as she saw NP Carolinan earlier this week for this issue, this message should have been routed to NP Haley to address.    Just check on her.    Thanks.

## 2023-06-26 ENCOUNTER — TELEPHONE (OUTPATIENT)
Dept: URGENT CARE | Facility: CLINIC | Age: 53
End: 2023-06-26
Payer: COMMERCIAL

## 2023-07-06 ENCOUNTER — OFFICE VISIT (OUTPATIENT)
Dept: FAMILY MEDICINE | Facility: CLINIC | Age: 53
End: 2023-07-06
Payer: COMMERCIAL

## 2023-07-06 VITALS
HEIGHT: 65 IN | OXYGEN SATURATION: 98 % | HEART RATE: 72 BPM | SYSTOLIC BLOOD PRESSURE: 120 MMHG | BODY MASS INDEX: 48.82 KG/M2 | DIASTOLIC BLOOD PRESSURE: 86 MMHG | RESPIRATION RATE: 18 BRPM | TEMPERATURE: 98 F | WEIGHT: 293 LBS

## 2023-07-06 DIAGNOSIS — E66.01 MORBID OBESITY WITH BMI OF 50.0-59.9, ADULT: ICD-10-CM

## 2023-07-06 DIAGNOSIS — E55.9 VITAMIN D DEFICIENCY: ICD-10-CM

## 2023-07-06 DIAGNOSIS — F43.23 ADJUSTMENT DISORDER WITH MIXED ANXIETY AND DEPRESSED MOOD: ICD-10-CM

## 2023-07-06 DIAGNOSIS — R73.03 PREDIABETES: ICD-10-CM

## 2023-07-06 DIAGNOSIS — M47.9 ARTHRITIS OF BACK: ICD-10-CM

## 2023-07-06 DIAGNOSIS — I10 ESSENTIAL HYPERTENSION: ICD-10-CM

## 2023-07-06 DIAGNOSIS — Z78.0 POSTMENOPAUSAL: ICD-10-CM

## 2023-07-06 DIAGNOSIS — Z00.00 ANNUAL PHYSICAL EXAM: Primary | ICD-10-CM

## 2023-07-06 DIAGNOSIS — M17.10 ARTHRITIS OF KNEE: ICD-10-CM

## 2023-07-06 PROCEDURE — 3061F PR NEG MICROALBUMINURIA RESULT DOCUMENTED/REVIEW: ICD-10-PCS | Mod: CPTII,S$GLB,, | Performed by: FAMILY MEDICINE

## 2023-07-06 PROCEDURE — 3079F PR MOST RECENT DIASTOLIC BLOOD PRESSURE 80-89 MM HG: ICD-10-PCS | Mod: CPTII,S$GLB,, | Performed by: FAMILY MEDICINE

## 2023-07-06 PROCEDURE — 3074F PR MOST RECENT SYSTOLIC BLOOD PRESSURE < 130 MM HG: ICD-10-PCS | Mod: CPTII,S$GLB,, | Performed by: FAMILY MEDICINE

## 2023-07-06 PROCEDURE — 3061F NEG MICROALBUMINURIA REV: CPT | Mod: CPTII,S$GLB,, | Performed by: FAMILY MEDICINE

## 2023-07-06 PROCEDURE — 99396 PREV VISIT EST AGE 40-64: CPT | Mod: S$GLB,,, | Performed by: FAMILY MEDICINE

## 2023-07-06 PROCEDURE — 99396 PR PREVENTIVE VISIT,EST,40-64: ICD-10-PCS | Mod: S$GLB,,, | Performed by: FAMILY MEDICINE

## 2023-07-06 PROCEDURE — 3008F BODY MASS INDEX DOCD: CPT | Mod: CPTII,S$GLB,, | Performed by: FAMILY MEDICINE

## 2023-07-06 PROCEDURE — 3008F PR BODY MASS INDEX (BMI) DOCUMENTED: ICD-10-PCS | Mod: CPTII,S$GLB,, | Performed by: FAMILY MEDICINE

## 2023-07-06 PROCEDURE — 3066F NEPHROPATHY DOC TX: CPT | Mod: CPTII,S$GLB,, | Performed by: FAMILY MEDICINE

## 2023-07-06 PROCEDURE — 99999 PR PBB SHADOW E&M-EST. PATIENT-LVL V: ICD-10-PCS | Mod: PBBFAC,,, | Performed by: FAMILY MEDICINE

## 2023-07-06 PROCEDURE — 3066F PR DOCUMENTATION OF TREATMENT FOR NEPHROPATHY: ICD-10-PCS | Mod: CPTII,S$GLB,, | Performed by: FAMILY MEDICINE

## 2023-07-06 PROCEDURE — 3074F SYST BP LT 130 MM HG: CPT | Mod: CPTII,S$GLB,, | Performed by: FAMILY MEDICINE

## 2023-07-06 PROCEDURE — 99999 PR PBB SHADOW E&M-EST. PATIENT-LVL V: CPT | Mod: PBBFAC,,, | Performed by: FAMILY MEDICINE

## 2023-07-06 PROCEDURE — 3072F LOW RISK FOR RETINOPATHY: CPT | Mod: CPTII,S$GLB,, | Performed by: FAMILY MEDICINE

## 2023-07-06 PROCEDURE — 3072F PR LOW RISK FOR RETINOPATHY: ICD-10-PCS | Mod: CPTII,S$GLB,, | Performed by: FAMILY MEDICINE

## 2023-07-06 PROCEDURE — 3079F DIAST BP 80-89 MM HG: CPT | Mod: CPTII,S$GLB,, | Performed by: FAMILY MEDICINE

## 2023-07-06 NOTE — PROGRESS NOTES
HISTORY OF PRESENT ILLNESS: Ms. Ovalle comes in today fasting and with taking medications for annual wellness examination. She reports no acute problems today.     END OF LIFE DECISION: She has a living will and does desire life support.    Current Outpatient Medications   Medication Sig    aspirin (ECOTRIN) 81 MG EC tablet Take 81 mg by mouth once daily.    carvediloL (COREG) 3.125 MG tablet Take 3.125 mg by mouth 2 (two) times daily.    cholecalciferol, vitamin D3, 125 mcg (5,000 unit) Tab Take 5,000 Units by mouth every other day.    cranberry fruit concentrate (AZO CRANBERRY ORAL)     ergocalciferol (ERGOCALCIFEROL) 50,000 unit Cap Take 1 capsule (50,000 Units total) by mouth every 7 days.    metFORMIN (GLUCOPHAGE-XR) 500 MG ER 24hr tablet TAKE 3 TABLETS BY MOUTH EVERY DAY    nystatin (MYCOSTATIN) powder Apply topically 2 (two) times daily. One bottle    OZEMPIC 2 mg/dose (8 mg/3 mL) PnIj Inject 2 mg into the skin every 7 days.     SCREENINGS:   Cholesterol: November 30, 2021.  FFS/Colonoscopy: Never. She states she will schedule with [facility].  Mammogram: December 12, 2022 with GYN Dr. Sultana gleason. She also follows with breast surgeon Dr. Greg Nguyen for ever 6 month breast cancer screening with exam and ultrasound with last visit with SUMI Gaytan on June 13, 2023.   GYN Exam: February 7, 2023 pap smear with GYN Dr. Sultana hernandez.  Dexa Scan: Never.  Eye Exam: Saw Dr. Olson on April 7, 2022. She wears glasses. Scheduled for August 17, 2023.  PPD: Negative in the past per patient.  Immunizations: Tdap - March 4, 2015.  Gardasil - N./A.  Zostavax - N./A.  Pneumovax - In the past.  Prevnar-20 shot - January 12, 2023.  Seasonal Flu - November 26, 2021.  Covid-19 (Moderna) vaccine series - March 12, 2021, April 9, 2021, November 5, 2021.  Shingrix - December 16, 2022, March 3, 2023.     ROS:  GENERAL: Denies fever, chills or unusual weight change. Appetite normal. Exercises not recently.  Reports monitors diet most times. Weight 138.3 kg (304 lb 14.3 oz) at January 12, 2023 visit. Reports occasional fatigue.   SKIN: Denies rashes, itching, changes in mole, color or texture of skin or easy bruising. Reports no longer wears compression stockings for varicose veins if flying.  HEAD: Denies headaches or recent head trauma.  EYES: Denies change in vision, pain, diplopia, redness or discharge. Wears glasses.  EARS: Denies ear pain, discharge, vertigo or decreased hearing.  NOSE: Denies loss of smell, epistaxis or rhinitis.  MOUTH & THROAT: Denies hoarseness or change in voice. Denies excessive gum bleeding or mouth sores. Denies sore throat.   NODES: Denies swollen glands.  CHEST: Denies ROSARIO, wheezing, cough or sputum production. Reports no longer follows with Dr. Mata, pulmonologist, for surveillance of pulmonary embolism and last saw him 2013.   CARDIOVASCULAR: Denies chest pain, PND, orthopnea or reduced exercise tolerance. Denies palpitations. Continues to follow with Dr. Haroldo Marks with [place] for surveillance of hypertension, cardiomegaly, pulmonary hypertension. Reports performs home blood pressure checks once per month with level at 120/70's.   ABDOMEN: Denies unusual diarrhea (as reports occasionally following gall bladder removal > 10 years ago), nausea, vomiting, abdominal pain, constipation, or blood in stool.   URINARY: Denies flank pain, dysuria or hematuria.  GENITOURINARY: Denies flank pain, dysuria, frequency or hematuria. Occasionally performs monthly breast self examination. S/P hysterectomy in March 2023.  ENDOCRINE: Denies diabetes, thyroid or cholesterol problems. Reports no longer follows with MARQUES Portillo with Dr. Jones with [facility] but continues Ozempic.   HEME/LYMPH: Denies bleeding problems. Reports no longer follows with Dr. Mathew, hematologist, with last visit on October 5, 2021 for GABRIEL, vitamin D deficiency with 1-year follow up advised.  "No longer follows with Dr. Pascual, hematologist, > 3 years ago for surveillance of Lupus anticoagulant disorder; she is now only on EC ASA 81 mg daily since Xarelto was discontinued April 2014.  PERIPHERAL VASCULAR:Denies claudication or cyanosis.  MUSCULOSKELETAL: Denies joint stiffness, pain or swelling except reports intermittent non-traumatic pain at both of knees but not today; follows with Dr. Minaya, orthopedist, annually for right knee gel injection with last visit in August 2022. Denies edema.   NEUROLOGIC: Denies history of seizures, tremors, paralysis, alteration of gait or coordination.  PSYCHIATRIC: Denies mood swings, depression, anxiety, homicidal or suicidal thoughts. Denies sleep problems.      PE:   VS: Blood Pressure 120/86   Pulse 72   Temperature 97.6 °F (36.4 °C)   Respiration 18   Height 5' 5" (1.651 m)   Weight (Abnormal) 139.9 kg (308 lb 6.8 oz)   Last Menstrual Period  (LMP Unknown)   Oxygen Saturation 98%   Body Mass Index 51.32 kg/m²   APPEARANCE: Well nourished, well developed female, obese and pleasant, alert and oriented in no acute distress.   HEAD: Non tender. Full range of motion.  EYES: PERRL, conjunctiva pink, lids no edema. She wears glasses.  EARS: External canal patent, no swelling or redness. TM's shiny and clear.  NOSE: Mucosa and turbinates pink, not swollen. No discharge. Non tender sinuses.  THROAT: No pharyngeal erythema or exudate. No stridor.   NECK: Supple, no mass, thyroid not enlarged.  NODES: No cervical lymph node enlargement.  CHEST: Normal respiratory effort. Lungs clear to auscultation.  CARDIOVASCULAR: Normal S1, S2. No rubs, murmurs or gallops. PMI not displaced. No carotid bruit. Pedal pulses palpable bilaterally. No edema.  ABDOMEN: Bowel sounds present. Not distended. Soft. No tenderness, masses or organomegaly.  BREAST EXAM: Not examined but deferred to GYN.  PELVIC EXAM: Not examined but deferred to GYN.  RECTAL EXAM: Not examined per patient " request.  MUSCULOSKELETAL: No joint deformities or stiffness. She is ambulatory without problems.   SKIN: No rashes or suspicious lesions, normal color and turgor. Non tender slight varicose veins at lower legs.  NEUROLOGIC: Cranial Nerves: II-XII grossly intact. DTR's: Knees, Ankles 2+ and equal bilaterally. Gait & Posture: Normal gait and fine motion.     Protective Sensation (w/ 10 gram monofilament):  Right: Intact  Left: Intact    Visual Inspection:  Normal -  Bilateral    Pedal Pulses:   Right: Present  Left: Present    Posterior Tibialis Pulses:   Right:Present  Left: Present     ASSESSMENT:    ICD-10-CM ICD-9-CM    1. Annual physical exam  Z00.00 V70.0 CBC Auto Differential      TSH      Urinalysis      Comprehensive Metabolic Panel      Lipid Panel      Vitamin D      Ferritin      Hemoglobin A1C      2. Essential hypertension  I10 401.9       3. Prediabetes  R73.03 790.29       4. Vitamin D deficiency  E55.9 268.9       5. Arthritis of knee  M17.10 716.96       6. Arthritis of back  M47.9 721.90       7. Adjustment disorder with mixed anxiety and depressed mood  F43.23 309.28       8. Morbid obesity with BMI of 50.0-59.9, adult  E66.01 278.01     Z68.43 V85.43       9. Postmenopausal  Z78.0 V49.81           PLAN:  1. Age-appropriate counseling-appropriate low-sodium, low-cholesterol, low carbohydrate diet and exercise daily, monthly breast self exam, annual wellness examination.   2. Patient advised to call for results.  3. Continue current medications.  4. Keep follow up with specialists.  5. Flu shot this fall.   6. Follow up in about 6 months (around 1/6/2024) for hypertension and prediabetes follow up.

## 2023-07-27 ENCOUNTER — OFFICE VISIT (OUTPATIENT)
Dept: OPHTHALMOLOGY | Facility: CLINIC | Age: 53
End: 2023-07-27
Payer: COMMERCIAL

## 2023-07-27 ENCOUNTER — PATIENT MESSAGE (OUTPATIENT)
Dept: OPHTHALMOLOGY | Facility: CLINIC | Age: 53
End: 2023-07-27

## 2023-07-27 DIAGNOSIS — H52.203 MYOPIA WITH ASTIGMATISM AND PRESBYOPIA, BILATERAL: ICD-10-CM

## 2023-07-27 DIAGNOSIS — G51.4 EYELID MYOKYMIA: ICD-10-CM

## 2023-07-27 DIAGNOSIS — H52.13 MYOPIA WITH ASTIGMATISM AND PRESBYOPIA, BILATERAL: ICD-10-CM

## 2023-07-27 DIAGNOSIS — E11.9 TYPE 2 DIABETES MELLITUS WITHOUT RETINOPATHY: Primary | ICD-10-CM

## 2023-07-27 DIAGNOSIS — H50.332 INTERMITTENT EXOTROPIA OF LEFT EYE: ICD-10-CM

## 2023-07-27 DIAGNOSIS — H52.4 MYOPIA WITH ASTIGMATISM AND PRESBYOPIA, BILATERAL: ICD-10-CM

## 2023-07-27 PROCEDURE — 1160F PR REVIEW ALL MEDS BY PRESCRIBER/CLIN PHARMACIST DOCUMENTED: ICD-10-PCS | Mod: CPTII,S$GLB,, | Performed by: OPTOMETRIST

## 2023-07-27 PROCEDURE — 92014 PR EYE EXAM, EST PATIENT,COMPREHESV: ICD-10-PCS | Mod: S$GLB,,, | Performed by: OPTOMETRIST

## 2023-07-27 PROCEDURE — 92015 PR REFRACTION: ICD-10-PCS | Mod: S$GLB,,, | Performed by: OPTOMETRIST

## 2023-07-27 PROCEDURE — 2023F PR DILATED RETINAL EXAM W/O EVID OF RETINOPATHY: ICD-10-PCS | Mod: CPTII,S$GLB,, | Performed by: OPTOMETRIST

## 2023-07-27 PROCEDURE — 2023F DILAT RTA XM W/O RTNOPTHY: CPT | Mod: CPTII,S$GLB,, | Performed by: OPTOMETRIST

## 2023-07-27 PROCEDURE — 1160F RVW MEDS BY RX/DR IN RCRD: CPT | Mod: CPTII,S$GLB,, | Performed by: OPTOMETRIST

## 2023-07-27 PROCEDURE — 99999 PR PBB SHADOW E&M-EST. PATIENT-LVL III: ICD-10-PCS | Mod: PBBFAC,,, | Performed by: OPTOMETRIST

## 2023-07-27 PROCEDURE — 3066F NEPHROPATHY DOC TX: CPT | Mod: CPTII,S$GLB,, | Performed by: OPTOMETRIST

## 2023-07-27 PROCEDURE — 3066F PR DOCUMENTATION OF TREATMENT FOR NEPHROPATHY: ICD-10-PCS | Mod: CPTII,S$GLB,, | Performed by: OPTOMETRIST

## 2023-07-27 PROCEDURE — 3061F PR NEG MICROALBUMINURIA RESULT DOCUMENTED/REVIEW: ICD-10-PCS | Mod: CPTII,S$GLB,, | Performed by: OPTOMETRIST

## 2023-07-27 PROCEDURE — 1159F PR MEDICATION LIST DOCUMENTED IN MEDICAL RECORD: ICD-10-PCS | Mod: CPTII,S$GLB,, | Performed by: OPTOMETRIST

## 2023-07-27 PROCEDURE — 92014 COMPRE OPH EXAM EST PT 1/>: CPT | Mod: S$GLB,,, | Performed by: OPTOMETRIST

## 2023-07-27 PROCEDURE — 99999 PR PBB SHADOW E&M-EST. PATIENT-LVL III: CPT | Mod: PBBFAC,,, | Performed by: OPTOMETRIST

## 2023-07-27 PROCEDURE — 1159F MED LIST DOCD IN RCRD: CPT | Mod: CPTII,S$GLB,, | Performed by: OPTOMETRIST

## 2023-07-27 PROCEDURE — 3061F NEG MICROALBUMINURIA REV: CPT | Mod: CPTII,S$GLB,, | Performed by: OPTOMETRIST

## 2023-07-27 PROCEDURE — 92015 DETERMINE REFRACTIVE STATE: CPT | Mod: S$GLB,,, | Performed by: OPTOMETRIST

## 2023-07-27 NOTE — PROGRESS NOTES
[facility]    RTC 1 yr for dilated eye exam  Last eye exam 4/7/22 DNL    Lab Results       Component                Value               Date                       HGBA1C                   5.9 (H)             07/02/2021                     Vision changes since last eye exam?: yes     Any eye pain today: no, just feeling a nerve jump in corner of OS    Other ocular symptoms: yes but not often. Same floaters    Interested in contact lens fitting today? no             Last edited by Concetta Winston on 7/27/2023  1:59 PM.            Assessment /Plan     For exam results, see Encounter Report.    Type 2 diabetes mellitus without retinopathy  There was no diabetic retinopathy present in either eye today.   Recommended that pt continue care with PCP and/or specialists regarding diabetes.  Follow-up dilated eye exam recommended in 12 months, sooner with any vision changes or new concerns.    Eyelid myokymia  Discussed caffeine, stress as most likely etiological causes  Pt to let us know if symptoms worsen or persist    Intermittent exotropia of left eye  Myopia with astigmatism and presbyopia, bilateral  Eyeglass Final Rx       Eyeglass Final Rx         Sphere Cylinder Axis    Right -3.75 +2.00 140    Left -5.25 +2.50 049      Type: SVL distance    Expiration Date: 7/27/2024              Eyeglass Final Rx #2         Sphere Cylinder Axis    Right -2.50 +2.00 140    Left -4.00 +2.50 049      Type: SVL near/computer    Expiration Date: 7/27/2024                      RTC 1 yr for dilated eye exam or PRN if any problems.   Discussed above and answered questions.

## 2023-08-15 LAB
CHOLEST SERPL-MSCNC: 231 MG/DL
EST. AVERAGE GLUCOSE BLD GHB EST-MCNC: 114 MG/DL
HBA1C MFR BLD: 5.6 % (ref 4.3–6.5)
HDLC SERPL-MCNC: 71 MG/DL
LDL CHOLESTEROL DIRECT: 114.46 MG/DL (ref 0–130)
TRIGL SERPL-MCNC: 96 MG/DL (ref 35–175)
VLDLC SERPL-MCNC: 19 MG/DL (ref 0–35)

## 2023-08-22 ENCOUNTER — TELEPHONE (OUTPATIENT)
Dept: FAMILY MEDICINE | Facility: CLINIC | Age: 53
End: 2023-08-22
Payer: COMMERCIAL

## 2023-08-23 ENCOUNTER — PATIENT MESSAGE (OUTPATIENT)
Dept: FAMILY MEDICINE | Facility: CLINIC | Age: 53
End: 2023-08-23
Payer: COMMERCIAL

## 2023-09-11 PROBLEM — Z13.71 BRCA NEGATIVE: Status: RESOLVED | Noted: 2023-06-12 | Resolved: 2023-09-11

## 2023-09-14 ENCOUNTER — PATIENT MESSAGE (OUTPATIENT)
Dept: FAMILY MEDICINE | Facility: CLINIC | Age: 53
End: 2023-09-14
Payer: COMMERCIAL

## 2023-09-14 NOTE — TELEPHONE ENCOUNTER
Patient wants to know if you have received her labs from Pily Frye? She messaged about getting her results.       Thanks,  Emiliana

## 2023-09-26 ENCOUNTER — PATIENT OUTREACH (OUTPATIENT)
Dept: ADMINISTRATIVE | Facility: HOSPITAL | Age: 53
End: 2023-09-26
Payer: COMMERCIAL

## 2023-10-05 ENCOUNTER — OFFICE VISIT (OUTPATIENT)
Dept: DERMATOLOGY | Facility: CLINIC | Age: 53
End: 2023-10-05
Payer: COMMERCIAL

## 2023-10-05 DIAGNOSIS — L68.0 HIRSUTISM: Primary | ICD-10-CM

## 2023-10-05 DIAGNOSIS — L70.0 ACNE VULGARIS: ICD-10-CM

## 2023-10-05 PROCEDURE — 3066F NEPHROPATHY DOC TX: CPT | Mod: CPTII,S$GLB,, | Performed by: STUDENT IN AN ORGANIZED HEALTH CARE EDUCATION/TRAINING PROGRAM

## 2023-10-05 PROCEDURE — 99999 PR PBB SHADOW E&M-EST. PATIENT-LVL III: ICD-10-PCS | Mod: PBBFAC,,, | Performed by: STUDENT IN AN ORGANIZED HEALTH CARE EDUCATION/TRAINING PROGRAM

## 2023-10-05 PROCEDURE — 3061F NEG MICROALBUMINURIA REV: CPT | Mod: CPTII,S$GLB,, | Performed by: STUDENT IN AN ORGANIZED HEALTH CARE EDUCATION/TRAINING PROGRAM

## 2023-10-05 PROCEDURE — 1159F MED LIST DOCD IN RCRD: CPT | Mod: CPTII,S$GLB,, | Performed by: STUDENT IN AN ORGANIZED HEALTH CARE EDUCATION/TRAINING PROGRAM

## 2023-10-05 PROCEDURE — 3044F PR MOST RECENT HEMOGLOBIN A1C LEVEL <7.0%: ICD-10-PCS | Mod: CPTII,S$GLB,, | Performed by: STUDENT IN AN ORGANIZED HEALTH CARE EDUCATION/TRAINING PROGRAM

## 2023-10-05 PROCEDURE — 99999 PR PBB SHADOW E&M-EST. PATIENT-LVL III: CPT | Mod: PBBFAC,,, | Performed by: STUDENT IN AN ORGANIZED HEALTH CARE EDUCATION/TRAINING PROGRAM

## 2023-10-05 PROCEDURE — 1159F PR MEDICATION LIST DOCUMENTED IN MEDICAL RECORD: ICD-10-PCS | Mod: CPTII,S$GLB,, | Performed by: STUDENT IN AN ORGANIZED HEALTH CARE EDUCATION/TRAINING PROGRAM

## 2023-10-05 PROCEDURE — 3061F PR NEG MICROALBUMINURIA RESULT DOCUMENTED/REVIEW: ICD-10-PCS | Mod: CPTII,S$GLB,, | Performed by: STUDENT IN AN ORGANIZED HEALTH CARE EDUCATION/TRAINING PROGRAM

## 2023-10-05 PROCEDURE — 1160F RVW MEDS BY RX/DR IN RCRD: CPT | Mod: CPTII,S$GLB,, | Performed by: STUDENT IN AN ORGANIZED HEALTH CARE EDUCATION/TRAINING PROGRAM

## 2023-10-05 PROCEDURE — 3066F PR DOCUMENTATION OF TREATMENT FOR NEPHROPATHY: ICD-10-PCS | Mod: CPTII,S$GLB,, | Performed by: STUDENT IN AN ORGANIZED HEALTH CARE EDUCATION/TRAINING PROGRAM

## 2023-10-05 PROCEDURE — 1160F PR REVIEW ALL MEDS BY PRESCRIBER/CLIN PHARMACIST DOCUMENTED: ICD-10-PCS | Mod: CPTII,S$GLB,, | Performed by: STUDENT IN AN ORGANIZED HEALTH CARE EDUCATION/TRAINING PROGRAM

## 2023-10-05 PROCEDURE — 3044F HG A1C LEVEL LT 7.0%: CPT | Mod: CPTII,S$GLB,, | Performed by: STUDENT IN AN ORGANIZED HEALTH CARE EDUCATION/TRAINING PROGRAM

## 2023-10-05 PROCEDURE — 99204 PR OFFICE/OUTPT VISIT, NEW, LEVL IV, 45-59 MIN: ICD-10-PCS | Mod: S$GLB,,, | Performed by: STUDENT IN AN ORGANIZED HEALTH CARE EDUCATION/TRAINING PROGRAM

## 2023-10-05 PROCEDURE — 99204 OFFICE O/P NEW MOD 45 MIN: CPT | Mod: S$GLB,,, | Performed by: STUDENT IN AN ORGANIZED HEALTH CARE EDUCATION/TRAINING PROGRAM

## 2023-10-05 RX ORDER — TRETINOIN 0.25 MG/G
CREAM TOPICAL NIGHTLY
Qty: 45 G | Refills: 2 | Status: SHIPPED | OUTPATIENT
Start: 2023-10-05

## 2023-10-05 NOTE — PROGRESS NOTES
Subjective:       Patient ID:  Jenna Ovalle is a 53 y.o. female who presents for   Chief Complaint   Patient presents with    Skin Discoloration     Pt is present for facial discoloration.  Pt states that dark marks on face have been due to facial hair and routine waxes.  Pt is here for medical advice on if she should stop waxing and start laser     History of Present Illness: The patient presents with chief complaint of unwanted facial hairs and dark marks.  Location: mostly along the lower face, chin, and neck  Duration: years  Signs/Symptoms: unwanted facial hairs and dark spots   Prior treatments: has had laser hair removal in the past. Normally just waxes the area.       Skin Discoloration        Review of Systems   Constitutional:  Negative for fever and chills.   Skin:  Negative for itching, rash and dry skin.        Objective:    Physical Exam   Constitutional: She appears well-developed and well-nourished. No distress.   Neurological: She is alert and oriented to person, place, and time. She is not disoriented.   Psychiatric: She has a normal mood and affect.   Skin:   Areas Examined (abnormalities noted in diagram):   Head / Face Inspection Performed  Neck Inspection Performed              Diagram Legend     Erythematous scaling macule/papule c/w actinic keratosis       Vascular papule c/w angioma      Pigmented verrucoid papule/plaque c/w seborrheic keratosis      Yellow umbilicated papule c/w sebaceous hyperplasia      Irregularly shaped tan macule c/w lentigo     1-2 mm smooth white papules consistent with Milia      Movable subcutaneous cyst with punctum c/w epidermal inclusion cyst      Subcutaneous movable cyst c/w pilar cyst      Firm pink to brown papule c/w dermatofibroma      Pedunculated fleshy papule(s) c/w skin tag(s)      Evenly pigmented macule c/w junctional nevus     Mildly variegated pigmented, slightly irregular-bordered macule c/w mildly atypical nevus      Flesh colored to  evenly pigmented papule c/w intradermal nevus       Pink pearly papule/plaque c/w basal cell carcinoma      Erythematous hyperkeratotic cursted plaque c/w SCC      Surgical scar with no sign of skin cancer recurrence      Open and closed comedones      Inflammatory papules and pustules      Verrucoid papule consistent consistent with wart     Erythematous eczematous patches and plaques     Dystrophic onycholytic nail with subungual debris c/w onychomycosis     Umbilicated papule    Erythematous-base heme-crusted tan verrucoid plaque consistent with inflamed seborrheic keratosis     Erythematous Silvery Scaling Plaque c/w Psoriasis     See annotation      Assessment / Plan:        Hirsutism - Discussed treatment options including topicals, oral and laser hair removal. Patient would like to try topicals. Eflornithine sent (via [facility]). Also start tretinoin cream nightly.              Follow up in about 3 months (around 1/5/2024).

## 2023-10-05 NOTE — PATIENT INSTRUCTIONS

## 2023-11-02 ENCOUNTER — OFFICE VISIT (OUTPATIENT)
Dept: FAMILY MEDICINE | Facility: CLINIC | Age: 53
End: 2023-11-02
Payer: COMMERCIAL

## 2023-11-02 VITALS
HEART RATE: 70 BPM | BODY MASS INDEX: 48.82 KG/M2 | DIASTOLIC BLOOD PRESSURE: 72 MMHG | WEIGHT: 293 LBS | SYSTOLIC BLOOD PRESSURE: 108 MMHG | OXYGEN SATURATION: 95 % | TEMPERATURE: 98 F | HEIGHT: 65 IN

## 2023-11-02 DIAGNOSIS — J30.9 ALLERGIC RHINITIS, UNSPECIFIED SEASONALITY, UNSPECIFIED TRIGGER: Primary | ICD-10-CM

## 2023-11-02 DIAGNOSIS — R05.9 COUGH, UNSPECIFIED TYPE: ICD-10-CM

## 2023-11-02 PROCEDURE — 3044F PR MOST RECENT HEMOGLOBIN A1C LEVEL <7.0%: ICD-10-PCS | Mod: CPTII,S$GLB,, | Performed by: REGISTERED NURSE

## 2023-11-02 PROCEDURE — 3061F NEG MICROALBUMINURIA REV: CPT | Mod: CPTII,S$GLB,, | Performed by: REGISTERED NURSE

## 2023-11-02 PROCEDURE — 99999 PR PBB SHADOW E&M-EST. PATIENT-LVL IV: CPT | Mod: PBBFAC,,, | Performed by: REGISTERED NURSE

## 2023-11-02 PROCEDURE — 3066F NEPHROPATHY DOC TX: CPT | Mod: CPTII,S$GLB,, | Performed by: REGISTERED NURSE

## 2023-11-02 PROCEDURE — 3078F PR MOST RECENT DIASTOLIC BLOOD PRESSURE < 80 MM HG: ICD-10-PCS | Mod: CPTII,S$GLB,, | Performed by: REGISTERED NURSE

## 2023-11-02 PROCEDURE — 3078F DIAST BP <80 MM HG: CPT | Mod: CPTII,S$GLB,, | Performed by: REGISTERED NURSE

## 2023-11-02 PROCEDURE — 3008F PR BODY MASS INDEX (BMI) DOCUMENTED: ICD-10-PCS | Mod: CPTII,S$GLB,, | Performed by: REGISTERED NURSE

## 2023-11-02 PROCEDURE — 99213 PR OFFICE/OUTPT VISIT, EST, LEVL III, 20-29 MIN: ICD-10-PCS | Mod: S$GLB,,, | Performed by: REGISTERED NURSE

## 2023-11-02 PROCEDURE — 3008F BODY MASS INDEX DOCD: CPT | Mod: CPTII,S$GLB,, | Performed by: REGISTERED NURSE

## 2023-11-02 PROCEDURE — 3074F PR MOST RECENT SYSTOLIC BLOOD PRESSURE < 130 MM HG: ICD-10-PCS | Mod: CPTII,S$GLB,, | Performed by: REGISTERED NURSE

## 2023-11-02 PROCEDURE — 99213 OFFICE O/P EST LOW 20 MIN: CPT | Mod: S$GLB,,, | Performed by: REGISTERED NURSE

## 2023-11-02 PROCEDURE — 3044F HG A1C LEVEL LT 7.0%: CPT | Mod: CPTII,S$GLB,, | Performed by: REGISTERED NURSE

## 2023-11-02 PROCEDURE — 3061F PR NEG MICROALBUMINURIA RESULT DOCUMENTED/REVIEW: ICD-10-PCS | Mod: CPTII,S$GLB,, | Performed by: REGISTERED NURSE

## 2023-11-02 PROCEDURE — 99999 PR PBB SHADOW E&M-EST. PATIENT-LVL IV: ICD-10-PCS | Mod: PBBFAC,,, | Performed by: REGISTERED NURSE

## 2023-11-02 PROCEDURE — 1159F PR MEDICATION LIST DOCUMENTED IN MEDICAL RECORD: ICD-10-PCS | Mod: CPTII,S$GLB,, | Performed by: REGISTERED NURSE

## 2023-11-02 PROCEDURE — 3066F PR DOCUMENTATION OF TREATMENT FOR NEPHROPATHY: ICD-10-PCS | Mod: CPTII,S$GLB,, | Performed by: REGISTERED NURSE

## 2023-11-02 PROCEDURE — 1159F MED LIST DOCD IN RCRD: CPT | Mod: CPTII,S$GLB,, | Performed by: REGISTERED NURSE

## 2023-11-02 PROCEDURE — 3074F SYST BP LT 130 MM HG: CPT | Mod: CPTII,S$GLB,, | Performed by: REGISTERED NURSE

## 2023-11-02 RX ORDER — SEMAGLUTIDE 1.34 MG/ML
INJECTION, SOLUTION SUBCUTANEOUS
COMMUNITY

## 2023-11-02 RX ORDER — BENZONATATE 200 MG/1
200 CAPSULE ORAL 3 TIMES DAILY PRN
Qty: 30 CAPSULE | Refills: 0 | Status: SHIPPED | OUTPATIENT
Start: 2023-11-02

## 2023-11-02 RX ORDER — PROMETHAZINE HYDROCHLORIDE AND DEXTROMETHORPHAN HYDROBROMIDE 6.25; 15 MG/5ML; MG/5ML
5 SYRUP ORAL
Qty: 180 ML | Refills: 0 | Status: SHIPPED | OUTPATIENT
Start: 2023-11-02

## 2023-11-02 NOTE — PATIENT INSTRUCTIONS
FOR COUGH:    1 T raw honey  1 cup fresh pineapple juice  1/4 cup lemon juice  1 piece of fresh armando (about 3 in long)    Mix well.  Dosin/4 cup up to 2 to 3 times daily    ---------------------------------------------------------------    FOR ILLNESS/BOOST IMMUNE SYSTEM:    2 T honey  2 T apple cider vinegar  2 T lemon juice  Cinnamon    Mix well in hot water.  Drink.  -----------------------------    MEDICINE BALL FROM [facility]

## 2023-11-02 NOTE — PROGRESS NOTES
SUBJECTIVE:     eJnna Ovalle is a 53 y.o. female is here today with c/o:    SINUS ISSUES    HPI:    Jenna is here with c/o sinus/allergy issues.  Week before last, she reports cleaning out her garage, lots of dust.  Since that time, has been sneezing with PND and cough.  Tx with Allegra 180 mg daily.      REVIEW OF SYSTEMS:    Review of Systems   Constitutional:  Negative for chills and fever.   HENT:  Positive for congestion. Negative for ear discharge, hearing loss, sinus pain and sore throat.    Eyes:  Negative for discharge.   Respiratory:  Positive for cough. Negative for hemoptysis, shortness of breath and wheezing.    Cardiovascular: Negative.  Negative for chest pain and palpitations.   Gastrointestinal:  Negative for blood in stool, constipation, diarrhea and vomiting.   Genitourinary:  Negative for dysuria and hematuria.   Musculoskeletal:  Negative for neck pain.   Neurological: Negative.  Negative for weakness and headaches.   Endo/Heme/Allergies:  Positive for environmental allergies. Negative for polydipsia.   All other systems reviewed and are negative.        CURRENT ALLERGIES:    Review of patient's allergies indicates:   Allergen Reactions    No known drug allergies        CURRENT PROBLEM LIST:    Patient Active Problem List   Diagnosis    Essential hypertension    AR (allergic rhinitis)    History of pulmonary embolism    Morbid obesity with BMI of 50.0-59.9, adult    Arthritis of back    Prediabetes    Arthritis of knee    Vitamin D deficiency    Exotropia of left eye    Thoracic radiculitis    Thoracic back pain    Uterine leiomyoma    Postmenopausal bleeding    Adjustment disorder with mixed anxiety and depressed mood    Major depressive disorder, recurrent severe without psychotic features    Postmenopausal    Family history of malignant neoplasm of breast       CURRENT MEDICATION LIST:      Current Outpatient Medications:     aspirin (ECOTRIN) 81 MG EC tablet, Take 81 mg by mouth  "once daily., Disp: , Rfl:     carvediloL (COREG) 3.125 MG tablet, Take 3.125 mg by mouth 2 (two) times daily., Disp: , Rfl:     cholecalciferol, vitamin D3, 125 mcg (5,000 unit) Tab, Take 5,000 Units by mouth every other day., Disp: , Rfl:     cranberry fruit concentrate (AZO CRANBERRY ORAL), , Disp: , Rfl:     ergocalciferol (ERGOCALCIFEROL) 50,000 unit Cap, Take 1 capsule (50,000 Units total) by mouth every 7 days., Disp: 12 capsule, Rfl: 3    metFORMIN (GLUCOPHAGE-XR) 500 MG ER 24hr tablet, TAKE 3 TABLETS BY MOUTH EVERY DAY, Disp: 90 tablet, Rfl: 5    nystatin (MYCOSTATIN) powder, Apply topically 2 (two) times daily. One bottle, Disp: 60 g, Rfl: 6    OZEMPIC 2 mg/dose (8 mg/3 mL) PnIj, Inject 2 mg into the skin every 7 days., Disp: , Rfl:     tretinoin (RETIN-A) 0.025 % cream, Apply topically every evening., Disp: 45 g, Rfl: 2      HISTORY:    Past medical, surgical, family and social histories have been reviewed today.      OBJECTIVE:     Vitals:    11/02/23 1620   BP: 108/72   Pulse: 70   Temp: 98.3 °F (36.8 °C)   SpO2: 95%   Weight: (!) 143.2 kg (315 lb 12.9 oz)   Height: 5' 5" (1.651 m)   PainSc:   6   PainLoc: Head       Physical Exam  Vitals reviewed.   Constitutional:       General: She is not in acute distress.  HENT:      Head: Normocephalic and atraumatic.      Right Ear: Tympanic membrane normal.      Left Ear: Tympanic membrane normal.      Nose: Mucosal edema present. No rhinorrhea.      Right Turbinates: Pale.      Left Turbinates: Pale.      Mouth/Throat:      Mouth: Mucous membranes are moist.      Pharynx: No pharyngeal swelling or posterior oropharyngeal erythema.      Tonsils: No tonsillar exudate.      Comments: Clear pnd noted w/ cobblestoning  Eyes:      Pupils: Pupils are equal, round, and reactive to light.   Cardiovascular:      Rate and Rhythm: Normal rate and regular rhythm.      Pulses: Normal pulses.      Heart sounds: Normal heart sounds.   Pulmonary:      Effort: Pulmonary effort is " normal.      Breath sounds: Normal breath sounds.   Musculoskeletal:         General: Normal range of motion.      Cervical back: Normal range of motion and neck supple. No rigidity.      Right lower leg: No edema.      Left lower leg: No edema.   Skin:     Capillary Refill: Capillary refill takes less than 2 seconds.   Neurological:      Mental Status: She is alert and oriented to person, place, and time.      Motor: No weakness.      Gait: Gait normal.   Psychiatric:         Mood and Affect: Mood normal.         Behavior: Behavior normal.         Thought Content: Thought content normal.         Judgment: Judgment normal.         ASSESSMENT:     1. Allergic rhinitis, unspecified seasonality, unspecified trigger --- continue Allegra 180 mg daily, add nasal spray if needed    2. Cough, unspecified type  -     promethazine-dextromethorphan (PROMETHAZINE-DM) 6.25-15 mg/5 mL Syrp; Take 5 mLs by mouth every 4 to 6 hours as needed (cough).  Dispense: 180 mL; Refill: 0  -     benzonatate (TESSALON) 200 MG capsule; Take 1 capsule (200 mg total) by mouth 3 (three) times daily as needed for Cough.  Dispense: 30 capsule; Refill: 0      PLAN:     Contact office back if s/s worsen or fail to improve.  RTC as directed and/or prn.        YAHIR Campbell  [facility]       25 minutes of total time spent on the encounter, which includes face to face time and non-face to face time preparing to see the patient.  This includes obtaining and/or reviewing separately obtained history, performing a medically appropriate examination and/or evaluation, and counseling and educating the patient/family/caregiver.  Includes documenting clinical information in the electronic or other health record, independently interpreting results (not separately reported) and communicating results to the patient/family/caregiver, with care coordination (not separately reported).  Medications, tests and/or procedures ordered as  necessary along with referring and communicating with other health professionals (when not separately reported).

## 2023-12-20 ENCOUNTER — OFFICE VISIT (OUTPATIENT)
Dept: SURGERY | Facility: CLINIC | Age: 53
End: 2023-12-20
Payer: COMMERCIAL

## 2023-12-20 DIAGNOSIS — Z80.3 FAMILY HISTORY OF MALIGNANT NEOPLASM OF BREAST: Primary | ICD-10-CM

## 2023-12-20 PROCEDURE — 3061F NEG MICROALBUMINURIA REV: CPT | Mod: CPTII,S$GLB,, | Performed by: NURSE PRACTITIONER

## 2023-12-20 PROCEDURE — 99213 OFFICE O/P EST LOW 20 MIN: CPT | Mod: S$GLB,,, | Performed by: NURSE PRACTITIONER

## 2023-12-20 PROCEDURE — 1159F PR MEDICATION LIST DOCUMENTED IN MEDICAL RECORD: ICD-10-PCS | Mod: CPTII,S$GLB,, | Performed by: NURSE PRACTITIONER

## 2023-12-20 PROCEDURE — 99999 PR PBB SHADOW E&M-EST. PATIENT-LVL III: CPT | Mod: PBBFAC,,, | Performed by: NURSE PRACTITIONER

## 2023-12-20 PROCEDURE — 3066F PR DOCUMENTATION OF TREATMENT FOR NEPHROPATHY: ICD-10-PCS | Mod: CPTII,S$GLB,, | Performed by: NURSE PRACTITIONER

## 2023-12-20 PROCEDURE — 3066F NEPHROPATHY DOC TX: CPT | Mod: CPTII,S$GLB,, | Performed by: NURSE PRACTITIONER

## 2023-12-20 PROCEDURE — 3044F HG A1C LEVEL LT 7.0%: CPT | Mod: CPTII,S$GLB,, | Performed by: NURSE PRACTITIONER

## 2023-12-20 PROCEDURE — 1159F MED LIST DOCD IN RCRD: CPT | Mod: CPTII,S$GLB,, | Performed by: NURSE PRACTITIONER

## 2023-12-20 PROCEDURE — 99213 PR OFFICE/OUTPT VISIT, EST, LEVL III, 20-29 MIN: ICD-10-PCS | Mod: S$GLB,,, | Performed by: NURSE PRACTITIONER

## 2023-12-20 PROCEDURE — 3044F PR MOST RECENT HEMOGLOBIN A1C LEVEL <7.0%: ICD-10-PCS | Mod: CPTII,S$GLB,, | Performed by: NURSE PRACTITIONER

## 2023-12-20 PROCEDURE — 3061F PR NEG MICROALBUMINURIA RESULT DOCUMENTED/REVIEW: ICD-10-PCS | Mod: CPTII,S$GLB,, | Performed by: NURSE PRACTITIONER

## 2023-12-20 PROCEDURE — 99999 PR PBB SHADOW E&M-EST. PATIENT-LVL III: ICD-10-PCS | Mod: PBBFAC,,, | Performed by: NURSE PRACTITIONER

## 2023-12-20 NOTE — ASSESSMENT & PLAN NOTE
We discussed her family history and how it could impact her own future risks.  We discussed family vs. genetic history and the importance and implications of each. All questions answered to her satisfaction.  She knows that as additional family members are diagnosed - she will need to let us know as this may change follow up and imaging recommendations. She will continue to be followed every 6 months in our [facility]. We reviewed our findings today and her questions were answered.  She understands that her imaging and exams have remained stable (and show nothing concerning).  She is comfortable being followed in a conservative fashion.      She understands the importance of monthly self-breast examination and knows to report any and all changes as they occur.

## 2023-12-20 NOTE — PROGRESS NOTES
[facility]  Greg Quinn MD, FACS  Sagar Gaytan NP-C      Date of Service: 12/20/2023    Chief Complaint:   Jenna Ovalle is a 53 y.o. female presenting today for her 6 month evaluation. She is due for her annual mammogram.  She reports no interval changes.     History of Present Illness:   Mrs. Ovalle first presented on February 25, 2021 due to her concerns of her future breast cancer risk. She has a family history that is worrisome. Her imaging has been normal. MyRisk Negative but with VUS on BRCA2, AXIN2, and POLE (2020). Her BO was calculated in 2020 and found to give her a 23.1 % Lifetime Risk of Breast Cancer. MD::: Sultana Daily MD.     Past Medical History:   Diagnosis Date    AR (allergic rhinitis)     BRCA negative 6/12/2023    Family history of malignant neoplasm of breast 6/12/2023    Hypertension     Low vitamin D level 03/10/2017    Menorrhagia     Morbidly obese     PE (pulmonary embolism) 2008    elevated ESR, CRP in the past; On OCPs    Plantar fasciitis     Prediabetes     Strabismus     Left eye    Uterine fibroid       Past Surgical History:   Procedure Laterality Date    CHOLECYSTECTOMY      HYSTEROSCOPY WITH DILATION AND CURETTAGE OF UTERUS  02/24/2022    LAPAROSCOPY      MYOMECTOMY  2004    laparotomy    SALPINGECTOMY Bilateral 03/23/2023    TOTAL ABDOMINAL HYSTERECTOMY  03/23/2023        Current Outpatient Medications:     aspirin (ECOTRIN) 81 MG EC tablet, Take 81 mg by mouth once daily., Disp: , Rfl:     benzonatate (TESSALON) 200 MG capsule, Take 1 capsule (200 mg total) by mouth 3 (three) times daily as needed for Cough., Disp: 30 capsule, Rfl: 0    carvediloL (COREG) 3.125 MG tablet, Take 3.125 mg by mouth 2 (two) times daily., Disp: , Rfl:     cholecalciferol, vitamin D3, 125 mcg (5,000 unit) Tab, Take 5,000 Units by mouth every other day., Disp: , Rfl:     ergocalciferol (ERGOCALCIFEROL) 50,000 unit Cap, Take 1 capsule (50,000  Units total) by mouth every 7 days., Disp: 12 capsule, Rfl: 3    metFORMIN (GLUCOPHAGE-XR) 500 MG ER 24hr tablet, TAKE 3 TABLETS BY MOUTH EVERY DAY, Disp: 90 tablet, Rfl: 5    nystatin (MYCOSTATIN) powder, Apply topically 2 (two) times daily. One bottle, Disp: 60 g, Rfl: 6    promethazine-dextromethorphan (PROMETHAZINE-DM) 6.25-15 mg/5 mL Syrp, Take 5 mLs by mouth every 4 to 6 hours as needed (cough)., Disp: 180 mL, Rfl: 0    semaglutide (OZEMPIC) 0.25 mg or 0.5 mg(2 mg/1.5 mL) pen injector, Inject into the skin every 7 days., Disp: , Rfl:     tretinoin (RETIN-A) 0.025 % cream, Apply topically every evening., Disp: 45 g, Rfl: 2    cranberry fruit concentrate (AZO CRANBERRY ORAL), , Disp: , Rfl:    Review of patient's allergies indicates:   Allergen Reactions    No known drug allergies       Social History     Tobacco Use    Smoking status: Never     Passive exposure: Never    Smokeless tobacco: Never   Substance Use Topics    Alcohol use: No     Alcohol/week: 0.0 standard drinks of alcohol      Family History   Problem Relation Age of Onset    Diabetes Mother     Cancer Mother         Lung cancer (nonsmoker)    Diabetes Father     Hypertension Father     Aneurysm Father         Brain aneurysm    Other Sister         Prediabetes    Diabetes Brother     Heart disease Paternal Grandmother     Cancer Paternal Grandfather         Pancreatic cancer    Breast cancer Maternal Aunt 36        again around 66    Cancer Maternal Aunt         Breast cancer    Kidney disease Maternal Aunt         dialysis    Cancer Paternal Aunt         Breast cancer    Breast cancer Paternal Aunt 51    Ovarian cancer Paternal Aunt 77    Stroke Paternal Uncle     Breast cancer Maternal Cousin 42    Breast cancer Paternal Cousin 48        Review of Systems   Integumentary:  Negative for color change, rash, mole/lesion, breast mass, breast discharge and breast tenderness.   Breast: Negative for mass and tenderness       Physical Exam   HENT:    Head: Normocephalic.   Pulmonary/Chest: Right breast exhibits no inverted nipple, no mass, no nipple discharge, no skin change and no tenderness. Left breast exhibits no inverted nipple, no mass, no nipple discharge, no skin change and no tenderness. No breast swelling.   Genitourinary: No breast swelling.   Musculoskeletal: Lymphadenopathy:      Upper Body:      Right upper body: No supraclavicular or axillary adenopathy.      Left upper body: No supraclavicular or axillary adenopathy.     Neurological: She is alert.        MAMMOGRAM REPORT: Her films are stable based on my review.  As this was done as a screening exam, her films will be reviewed by the Radiologist and compared to her previous films.  Her report will usually be received within 24 hours.  Once received and reviewed - I will phone her with any additional recommendations as needed.      ASSESSMENT and PLAN OF CARE     1. Family history of malignant neoplasm of breast  Assessment & Plan:  We discussed her family history and how it could impact her own future risks.  We discussed family vs. genetic history and the importance and implications of each. All questions answered to her satisfaction.  She knows that as additional family members are diagnosed - she will need to let us know as this may change follow up and imaging recommendations. She will continue to be followed every 6 months in our [facility]. We reviewed our findings today and her questions were answered.  She understands that her imaging and exams have remained stable (and show nothing concerning).  She is comfortable being followed in a conservative fashion.      She understands the importance of monthly self-breast examination and knows to report any and all changes as they occur.            Medical Decision Making: It is my impression that this patient suffers all conditions contained in this medical document.  Each of these conditions did affect our plan of care and my medical decision  making today.  It is my opinion that the medical decision making concerning this patient was of minimal  difficulty based on the aforementioned conditions.  Any further recommendations will be communicated to the patient by me.  I have reviewed and verified her allergies, list of medications, medical and surgical histories, social history, and a pertinent review of symptoms.      Follow up:  6 months and prn    For:  MRI vs Ultrasound    Addendum 12/26/23 1047: Mammogram- The breasts have scattered areas of fibroglandular density. There is no evidence of suspicious masses, calcifications, or other abnormal findings. There is no mammographic evidence of malignancy.

## 2023-12-21 NOTE — PROGRESS NOTES
Consults    Reason For Consult  Elevated S.creat and decreased pulmonary function    History Of Present Illness  Haile Ayers is a 84 y.o. male with PMH of CLL( on Ibrutinib), type 2 DM, HTN, dementia, indwelling Mckee (c/b multiple CAUTI), BPH presented with lt subdural hematoma (s/p craniotomy with evacuation on 11/22). Patient has had a prolonged hospital stay which has been c/b bacteremia, LIN, anemia, hypovolemia, respiratory failure 2/2 pneumonia, COVID, hypernatremia. Medicine consulted for LIN and reduced pulmonary function.    Pt obtunded and unable to provide any history. No relatives were present at the bedside. EMR reviewed. Pt started developing s/s of LIN on 12/15 with increasing s.creat, currently 3.23 (baseline 1.02 on 11/22/23).     Pt's respiratory status fluctuates and is currently saturating 94% on 2l NC with PRN requirement for BiPAP. Code status is DNR/DNI as per the primary team after discussion with the wife.      Past Medical History  He has a past medical history of Anemia, Chronic indwelling Mckee catheter, CLL (chronic lymphocytic leukemia) (CMS/HCC), Dementia (CMS/HCC), DM II (diabetes mellitus, type II), controlled (CMS/HCC), HTN (hypertension), Hyperlipidemia, and Leukemia (CMS/HCC).    Surgical History  He has no past surgical history on file.     Social History  He reports that he has never smoked. He has never used smokeless tobacco. No history on file for alcohol use and drug use.    Family History  Family History   Family history unknown: Yes     Allergies  Januvia [sitagliptin]    Review of Systems  As noted above    Physical Exam  Vitals reviewed.   Constitutional:       Appearance: He is ill-appearing.      Comments: Cachectic, obtunded, does not respond to verbal or tactile stimuli   HENT:      Head: Normocephalic and atraumatic.      Nose:      Comments: Dobhoff insitu, TF @ 60ml/hr     Mouth/Throat:      Mouth: Mucous membranes are dry.   Eyes:      Comments: Cannot assess  Manually uploaded and hyper-linked LIPID PANEL & CMP 11/30/2021     "adequately   Cardiovascular:      Rate and Rhythm: Tachycardia present.      Heart sounds: Normal heart sounds.   Pulmonary:      Effort: No respiratory distress.      Comments: Tachypnea with shallow breaths  Abdominal:      Palpations: Abdomen is soft.   Genitourinary:     Comments: Mckee in situ  Musculoskeletal:      Right lower leg: No edema.      Left lower leg: No edema.   Neurological:      Comments: Obtunded, does not repsond to verbal or tactile stimuli, withdraws to pain       Last Recorded Vitals  Blood pressure 144/71, pulse 97, temperature 36.8 °C (98.2 °F), temperature source Temporal, resp. rate (!) 34, height 1.727 m (5' 7.99\"), weight 70 kg (154 lb 5.2 oz), SpO2 96 %.    Relevant Results  Scheduled medications  amLODIPine, 10 mg, nasogastric tube, Daily  atorvastatin, 20 mg, oral, Daily  bisacodyl, 10 mg, rectal, Daily  cefTRIAXone, 1 g, intravenous, q24h  cholecalciferol, 4,000 Units, oral, Daily  heparin (porcine), 5,000 Units, subcutaneous, q8h  insulin glargine, 10 Units, subcutaneous, Nightly  [START ON 12/22/2023] insulin regular, 4 Units, subcutaneous, q6h  levETIRAcetam, 500 mg, intravenous, q12h  levothyroxine, 25 mcg, oral, Daily  pantoprazole, 40 mg, intravenous, BID  polyethylene glycol, 17 g, oral, Daily  thiamine, 100 mg, nasogastric tube, Daily      Continuous medications  oxygen, , Last Rate: 2 L/min (12/12/23 1713)      PRN medications  PRN medications: acetaminophen, albuterol, calcium gluconate, calcium gluconate, dextrose 10 % in water (D10W), dextrose, glucagon, hydrALAZINE, labetaloL, magnesium sulfate, magnesium sulfate, naloxone, ondansetron **OR** ondansetron, oxygen, potassium chloride CR **OR** potassium chloride, potassium chloride CR **OR** potassium chloride, potassium chloride, potassium chloride    Results for orders placed or performed during the hospital encounter of 11/21/23 (from the past 24 hour(s))   POCT GLUCOSE   Result Value Ref Range    POCT Glucose 178 (H) " 74 - 99 mg/dL   Blood Gas Arterial Full Panel   Result Value Ref Range    POCT pH, Arterial 7.40 7.38 - 7.42 pH    POCT pCO2, Arterial 42 38 - 42 mm Hg    POCT pO2, Arterial 79 (L) 85 - 95 mm Hg    POCT SO2, Arterial 97 94 - 100 %    POCT Oxy Hemoglobin, Arterial 95.2 94.0 - 98.0 %    POCT Hematocrit Calculated, Arterial 31.0 (L) 41.0 - 52.0 %    POCT Sodium, Arterial 141 136 - 145 mmol/L    POCT Potassium, Arterial 4.8 3.5 - 5.3 mmol/L    POCT Chloride, Arterial 113 (H) 98 - 107 mmol/L    POCT Ionized Calcium, Arterial 1.22 1.10 - 1.33 mmol/L    POCT Glucose, Arterial 247 (H) 74 - 99 mg/dL    POCT Lactate, Arterial 0.6 0.4 - 2.0 mmol/L    POCT Base Excess, Arterial 1.0 -2.0 - 3.0 mmol/L    POCT HCO3 Calculated, Arterial 26.0 22.0 - 26.0 mmol/L    POCT Hemoglobin, Arterial 10.2 (L) 13.5 - 17.5 g/dL    POCT Anion Gap, Arterial 7 (L) 10 - 25 mmo/L    Patient Temperature      FiO2 28 %   POCT GLUCOSE   Result Value Ref Range    POCT Glucose 214 (H) 74 - 99 mg/dL   CBC and Auto Differential   Result Value Ref Range    WBC 6.3 4.4 - 11.3 x10*3/uL    nRBC 0.0 0.0 - 0.0 /100 WBCs    RBC 3.37 (L) 4.50 - 5.90 x10*6/uL    Hemoglobin 10.0 (L) 13.5 - 17.5 g/dL    Hematocrit 29.8 (L) 41.0 - 52.0 %    MCV 88 80 - 100 fL    MCH 29.7 26.0 - 34.0 pg    MCHC 33.6 32.0 - 36.0 g/dL    RDW 14.3 11.5 - 14.5 %    Platelets 148 (L) 150 - 450 x10*3/uL    Neutrophils % 61.0 40.0 - 80.0 %    Immature Granulocytes %, Automated 0.3 0.0 - 0.9 %    Lymphocytes % 21.8 13.0 - 44.0 %    Monocytes % 12.1 2.0 - 10.0 %    Eosinophils % 4.6 0.0 - 6.0 %    Basophils % 0.2 0.0 - 2.0 %    Neutrophils Absolute 3.87 1.60 - 5.50 x10*3/uL    Immature Granulocytes Absolute, Automated 0.02 0.00 - 0.50 x10*3/uL    Lymphocytes Absolute 1.38 0.80 - 3.00 x10*3/uL    Monocytes Absolute 0.77 0.05 - 0.80 x10*3/uL    Eosinophils Absolute 0.29 0.00 - 0.40 x10*3/uL    Basophils Absolute 0.01 0.00 - 0.10 x10*3/uL   Renal function panel   Result Value Ref Range    Glucose  235 (H) 74 - 99 mg/dL    Sodium 148 (H) 136 - 145 mmol/L    Potassium 4.7 3.5 - 5.3 mmol/L    Chloride 113 (H) 98 - 107 mmol/L    Bicarbonate 25 21 - 32 mmol/L    Anion Gap 15 10 - 20 mmol/L    Urea Nitrogen 85 (H) 6 - 23 mg/dL    Creatinine 3.26 (H) 0.50 - 1.30 mg/dL    eGFR 18 (L) >60 mL/min/1.73m*2    Calcium 8.0 (L) 8.6 - 10.6 mg/dL    Phosphorus 3.9 2.5 - 4.9 mg/dL    Albumin 2.8 (L) 3.4 - 5.0 g/dL   POCT GLUCOSE   Result Value Ref Range    POCT Glucose 215 (H) 74 - 99 mg/dL   POCT GLUCOSE   Result Value Ref Range    POCT Glucose 239 (H) 74 - 99 mg/dL   POCT GLUCOSE   Result Value Ref Range    POCT Glucose 230 (H) 74 - 99 mg/dL   Renal function panel   Result Value Ref Range    Glucose 255 (H) 74 - 99 mg/dL    Sodium 145 136 - 145 mmol/L    Potassium 5.1 3.5 - 5.3 mmol/L    Chloride 111 (H) 98 - 107 mmol/L    Bicarbonate 25 21 - 32 mmol/L    Anion Gap 14 10 - 20 mmol/L    Urea Nitrogen 83 (H) 6 - 23 mg/dL    Creatinine 3.23 (H) 0.50 - 1.30 mg/dL    eGFR 18 (L) >60 mL/min/1.73m*2    Calcium 8.2 (L) 8.6 - 10.6 mg/dL    Phosphorus 3.9 2.5 - 4.9 mg/dL    Albumin 3.0 (L) 3.4 - 5.0 g/dL     XR chest 1 view  Result Date: 12/20/2023  FINDINGS: AP radiograph of the chest was provided.   Enteric tube coursing below the level of the diaphragm beyond the field of view.   CARDIOMEDIASTINAL SILHOUETTE: Cardiomediastinal contours are not well evaluated on current exam secondary to obscuration by multifocal pulmonary opacities. Aortic knob calcifications are noted.   LUNGS: Interval increase diffuse multifocal pulmonary opacities, most prominent in the right mid/lower lung zone with complete obscuration of the right hemidiaphragm right costophrenic angle. Addition there is blunting of the left costophrenic angle.   ABDOMEN: No remarkable upper abdominal findings.   BONES: No acute osseous changes.       1.  Interval increase in diffuse multifocal pulmonary opacities, most prominent in the right mid/lower lung zone, concerning  for worsening pulmonary edema, or multifocal pneumonia. Correlate clinically. 2. Blunting of bilateral costophrenic angles may be secondary to pulmonary opacities versus small bilateral pleural effusions, right-greater-than-left. Correlate with physical exam and patient's volume status.       ECG 12 Lead  Result Date: 12/20/2023  Normal sinus rhythm Anterior infarct , age undetermined Abnormal ECG When compared with ECG of 30-NOV-2023 14:29, Anterior infarct is now Present    CT head wo IV contrast  Result Date: 12/20/2023  FINDINGS: There are postoperative changes from prior left craniotomy at the vertex of the skull for subdural hemorrhage drainage. There is slightly hyperattenuating subdural fluid collection along the left cerebral convexity, most pronounced along the posterior aspect of the left temporal and parietal lobes measuring 12 mm in maximum thickness with stable associated mass effect. Just below the craniotomy site, there is a stable 6 mm in maximum thickness extra-axial, likely subdural, fluid collection with rim of hyperattenuation consistent with evolving acute blood products. Mass effect on the adjacent brain parenchyma is also unchanged. There is stable narrowing of the left lateral ventricle and 3 mm rightward midline shift at the level of the septum pellucidum.   Gray-white differentiation is maintained throughout. There are regions of hypoattenuation within the bilateral cerebral hemispheric white matter which are probably sequela of chronic small vessel ischemic changes.   There is mucosal thickening located within the right maxillary sinus layering fluid within the right sphenoid sinus, otherwise the visualized paranasal sinuses and mastoid air cells are essentially clear.       No significant interval change in intracranial findings compared to CT 12/18/2023. Stable postoperative changes from left craniotomy with stable residual subdural hemorrhage with associated mass effect along the left  cerebral convexity and mild midline shift.       IR angiogram  Result Date: 12/11/2023  FINDINGS: LEFT COMMON CAROTID ARTERY INJECTION: DSA runs were obtained over the head in [place]ic, PA and lateral views. The left external carotid artery and its major branches opacify well and are unremarkable. A small caliber left middle meningeal artery is visualized which is truncated distal to foramen spinosum likely related to postoperative changes from craniotomy. The distal cervical and intracranial left internal carotid artery opacify well and appear unremarkable.  Flash filling of the left posterior cerebral artery from the left posterior communicating artery is seen on this injection. The left internal carotid artery bifurcates normally into widely patent and unremarkable left anterior cerebral and left middle cerebral arteries. Due to reflux of contrast into a bovine arch anatomy during contrast injection there is flash filling of the right internal carotid artery, right middle cerebral artery, and right anterior cerebral artery which appear unremarkable. No aneurysm, early draining vein, or stenosis is seen.   RIGHT COMMON FEMORAL ARTERY INJECTION: DSA run over the right hip was obtained in ROMERO view. The right common femoral artery, femoral bifurcation, and distal vasculature opacify well appear unremarkable. There is no evidence of pseudoaneurysm, stenosis, dissection, or other type of vascular injury.     Poor distal filling of the left middle meningeal artery. No embolization was performed.       XR abdomen 1 view  Result Date: 12/10/2023  1.  Enteric tube tip overlying expected location of 2nd portion of duodenum. 2. Nonobstructive bowel gas pattern.       US renal complete  Result Date: 12/7/2023  1. Mild right pelvic fullness with otherwise unremarkable renal ultrasound. 2. Incidental note of moderate volume abdominal ascites.       EEG  IMPRESSION Impression This continuous video-EEG indicates a severe diffuse  encephalopathy. No epileptiform discharges are seen. There are no significative changes compared with yesterday's record..     Transthoracic Echo (TTE) Complete  Result Date: 12/6/2023  CONCLUSIONS:  1. Left ventricular systolic function is normal with a 65-70% estimated ejection fraction.  2. Poorly visualized anatomical structures due to suboptimal image quality.  3. Dynamic LV function with color acceleration within the LV cavity but no significant intracavitary or LVOT gradients at rest. Note that there is some JULIO C of the MV leaflets vs chordae, though not well seen. ( note that the color acceleration begins within the LV cavity, not just in the LVOT).  4. Spectral Doppler shows an impaired relaxation pattern of left ventricular diastolic filling.  5. A subtopimal agitated saline contast study was performed and some bubles did reach the left side, though the timing of appearance of the bubbles is unclear. May represent a PFO/intracardiac shunt vs intrapulmonary shunt.  6. Moderately elevated right ventricular systolic pressure.  7. No prior echocardiogram available for comparison.     Vascular US lower extremity venous duplex bilateral  Result Date: 12/5/2023  CONCLUSIONS: Right Lower Venous: There are chronic changes visualized in the distal Femoral, soleal and popliteal veins. Left Lower Venous: No evidence of acute deep vein thrombus visualized in the left lower extremity.    Vascular US upper extremity venous duplex bilateral  Result Date: 12/5/2023  CONCLUSIONS: Right Upper Venous: No evidence of acute deep vein thrombus visualized in the right upper extremity. Cannot rule out thrombus of non-visualized basilic vein. Left Upper Venous: No evidence of acute deep vein thrombus visualized in the left upper extremity. There are chronic changes visualized in the mid cephalic and distal cephalic veins.       Assessment/Plan     Haile Ayers is a 84 y.o. male with PMH of CLL( on Ibrutinib), type 2 DM, HTN,  dementia, indwelling Mckee (c/b multiple CAUTI), BPH presented with lt subdural hematoma (s/p craniotomy with evacuation on 11/22). Patient has had a prolonged hospital stay which has been c/b bacteremia, LIN, anemia, hypovolemia, respiratory failure 2/2 pneumonia, COVID, hypernatremia. Medicine consulted for evaluation and management of LIN and decreased pulmonary function.    Recommendations:  - LIN appears to be due to volume depletion along with underlying UTI  - Obtain urine electrolytes, RFP, Mg  - Trend creat  - Avoid nephrotoxic drugs  - Renally dose medications  - Follow ID and endocrine recs for management of infection and bl glucose levels  - Pt saturating well on 2L NC, Continue BiPAP PRN    Medicine will continue to follow.    Pt seen and discussed with Dr. Darden.    Gina Le  PGY-1  Anesthesiology

## 2024-01-30 ENCOUNTER — OFFICE VISIT (OUTPATIENT)
Dept: FAMILY MEDICINE | Facility: CLINIC | Age: 54
End: 2024-01-30
Attending: FAMILY MEDICINE
Payer: COMMERCIAL

## 2024-01-30 VITALS
TEMPERATURE: 98 F | SYSTOLIC BLOOD PRESSURE: 112 MMHG | WEIGHT: 293 LBS | BODY MASS INDEX: 48.82 KG/M2 | RESPIRATION RATE: 18 BRPM | HEIGHT: 65 IN | HEART RATE: 68 BPM | OXYGEN SATURATION: 98 % | DIASTOLIC BLOOD PRESSURE: 70 MMHG

## 2024-01-30 DIAGNOSIS — Z78.0 POSTMENOPAUSAL: ICD-10-CM

## 2024-01-30 DIAGNOSIS — M47.9 ARTHRITIS OF BACK: ICD-10-CM

## 2024-01-30 DIAGNOSIS — I10 ESSENTIAL HYPERTENSION: Primary | ICD-10-CM

## 2024-01-30 DIAGNOSIS — E66.01 MORBID OBESITY WITH BMI OF 50.0-59.9, ADULT: ICD-10-CM

## 2024-01-30 DIAGNOSIS — E55.9 VITAMIN D DEFICIENCY: ICD-10-CM

## 2024-01-30 DIAGNOSIS — R73.03 PREDIABETES: ICD-10-CM

## 2024-01-30 DIAGNOSIS — M17.10 ARTHRITIS OF KNEE: ICD-10-CM

## 2024-01-30 PROBLEM — F33.2 MAJOR DEPRESSIVE DISORDER, RECURRENT SEVERE WITHOUT PSYCHOTIC FEATURES: Status: RESOLVED | Noted: 2023-01-12 | Resolved: 2024-01-30

## 2024-01-30 PROCEDURE — 3074F SYST BP LT 130 MM HG: CPT | Mod: CPTII,S$GLB,, | Performed by: FAMILY MEDICINE

## 2024-01-30 PROCEDURE — 1160F RVW MEDS BY RX/DR IN RCRD: CPT | Mod: CPTII,S$GLB,, | Performed by: FAMILY MEDICINE

## 2024-01-30 PROCEDURE — 3008F BODY MASS INDEX DOCD: CPT | Mod: CPTII,S$GLB,, | Performed by: FAMILY MEDICINE

## 2024-01-30 PROCEDURE — 3078F DIAST BP <80 MM HG: CPT | Mod: CPTII,S$GLB,, | Performed by: FAMILY MEDICINE

## 2024-01-30 PROCEDURE — 99214 OFFICE O/P EST MOD 30 MIN: CPT | Mod: S$GLB,,, | Performed by: FAMILY MEDICINE

## 2024-01-30 PROCEDURE — 3072F LOW RISK FOR RETINOPATHY: CPT | Mod: CPTII,S$GLB,, | Performed by: FAMILY MEDICINE

## 2024-01-30 PROCEDURE — 1159F MED LIST DOCD IN RCRD: CPT | Mod: CPTII,S$GLB,, | Performed by: FAMILY MEDICINE

## 2024-01-30 PROCEDURE — 99999 PR PBB SHADOW E&M-EST. PATIENT-LVL V: CPT | Mod: PBBFAC,,, | Performed by: FAMILY MEDICINE

## 2024-01-30 NOTE — PROGRESS NOTES
Jenna Ovalle    Chief Complaint   Patient presents with    Hypertension    Pre-diabetes    Follow-up       History of Present Illness:   Ms. Ovalle comes in today for 6-month hypertension and prediabetes follow-up.  She states she is fasting and has taken medications today.  She states she does not perform home blood pressure checks.  She states she tries to monitor what she eats and only exercises once a week, 30 minutes each time.    She states she occasionally experiences postnasal drip and takes Allegra with help.  She states she does not smoke cigarettes.    She states she occasionally has back pain but not today.  She states she follows with orthopedist Dr. Vargas for chronic pain in her knees.    She saw EDUARDO Gaytan with breast surgeon Dr. Quinn on December 20, 2023 for surveillance of family history of malignant neoplasm of breast with 6-month follow-up advised.    She states she saw cardiologist with [facility] for surveillance of hypertension, history of pulmonary embolism.    She states she saw Dr. Philippe De La Torre, bariatric surgeon, last year for consultation for gastric bypass surgery.  She states she continues to be on the waiting list for payment by the [facility].  She states she will again see Dr. Terence Jones (now with [facility]) for weight management on February 1, 2024.  She states she takes Ozempic prn prediabetes and weight management as no longer covered by insurance.    She saw Dr. Olson, optometrist, on July 27, 2023 for surveillance of type 2 diabetes mellitus without retinopathy, eyelid myokymia, intermittent exotropia of left eye, myopia with astigmatism and presbyopia, bilateral           .    Otherwise, she denies having fever, chills, fatigue, appetite changes; shortness of breath, cough, wheezing; chest pain, palpitations, leg swelling; other sinus symptoms; abdominal pain, nausea, vomiting, diarrhea, constipation; unusual urinary symptoms; polydipsia,  polyphagia, polyuria, hot or cold intolerance; acute visual changes, numbness, headache; anxiety, depression, homicidal or suicidal thoughts.      She states she will schedule colonoscopy at hospital.    Labs:                    WBC                      4.81                07/02/2021                 HGB                      12.3                07/02/2021                 HCT                      38.8                07/02/2021                 PLT                      322                 07/02/2021                 CHOL                     231 (H)             08/15/2023                 TRIG                     96                  08/15/2023                 HDL                      71                  08/15/2023                 LDLDIRECT                114.46              08/15/2023                 ALT                      14                  11/30/2021                 AST                      13                  11/30/2021                 NA                       141                 11/30/2021                 K                        3.9                 11/30/2021                 CL                       105                 11/30/2021                 CREATININE               0.6                 11/30/2021                 BUN                      11                  11/30/2021                 CO2                      23                  11/30/2021                 TSH                      0.988               07/02/2021                 INR                      4.1 (H)             10/07/2010                  LDLCALC                  109.8               07/02/2021              LDLCALC                  109.8               07/02/2021                  Current Outpatient Medications   Medication Sig    aspirin (ECOTRIN) 81 MG EC tablet Take 81 mg by mouth once daily.    benzonatate (TESSALON) 200 MG capsule Take 1 capsule (200 mg total) by mouth 3 (three) times daily as needed for Cough.    carvediloL (COREG) 3.125 MG tablet  Take 3.125 mg by mouth 2 (two) times daily.    cholecalciferol, vitamin D3, 125 mcg (5,000 unit) Tab Take 5,000 Units by mouth every other day.    ergocalciferol (ERGOCALCIFEROL) 50,000 unit Cap Take 1 capsule (50,000 Units total) by mouth every 7 days.    metFORMIN (GLUCOPHAGE-XR) 500 MG ER 24hr tablet TAKE 3 TABLETS BY MOUTH EVERY DAY    nystatin (MYCOSTATIN) powder Apply topically 2 (two) times daily. One bottle    promethazine-dextromethorphan (PROMETHAZINE-DM) 6.25-15 mg/5 mL Syrp Take 5 mLs by mouth every 4 to 6 hours as needed (cough).    semaglutide (OZEMPIC) 0.25 mg or 0.5 mg(2 mg/1.5 mL) pen injector Inject into the skin every 7 days.    tretinoin (RETIN-A) 0.025 % cream Apply topically every evening.       Review of Systems   Constitutional:  Positive for activity change. Negative for appetite change, chills, fatigue, fever and unexpected weight change.        Weight 139.9 kg (308 lb 6.8 oz) at July 6, 2023 visit.   HENT:  Positive for postnasal drip. Negative for congestion, rhinorrhea, sinus pressure, sinus pain, sneezing and sore throat.    Eyes:  Negative for visual disturbance.   Respiratory:  Negative for cough, shortness of breath and wheezing.    Cardiovascular:  Negative for chest pain, palpitations and leg swelling.        See history of present illness.   Gastrointestinal:  Negative for abdominal pain, constipation, diarrhea, nausea and vomiting.   Endocrine: Negative for cold intolerance, heat intolerance, polydipsia, polyphagia and polyuria.        See history of present illness.   Genitourinary:  Negative for difficulty urinating and menstrual problem.   Musculoskeletal:  Positive for arthralgias and back pain. Negative for joint swelling.        See history of present illness.   Neurological:  Negative for numbness and headaches.   Hematological:         See history of present illness.   Psychiatric/Behavioral:  Negative for dysphoric mood and suicidal ideas. The patient is not  nervous/anxious.         Negative for homicidal ideas.  See history of present illness.       Objective:  Physical Exam  Vitals reviewed.   Constitutional:       General: She is not in acute distress.     Appearance: Normal appearance. She is well-developed. She is obese. She is not ill-appearing, toxic-appearing or diaphoretic.      Comments: Obese and pleasant.   HENT:      Head: Normocephalic and atraumatic.      Comments: Non tender sinuses.     Right Ear: Tympanic membrane, ear canal and external ear normal. There is no impacted cerumen.      Left Ear: Tympanic membrane, ear canal and external ear normal. There is no impacted cerumen.      Nose: Nose normal. No congestion or rhinorrhea.      Mouth/Throat:      Mouth: Mucous membranes are moist.      Pharynx: Oropharynx is clear. No oropharyngeal exudate or posterior oropharyngeal erythema.   Eyes:      General:         Right eye: No discharge.         Left eye: No discharge.      Extraocular Movements: Extraocular movements intact.      Conjunctiva/sclera: Conjunctivae normal.      Pupils: Pupils are equal, round, and reactive to light.   Neck:      Thyroid: No thyromegaly.   Cardiovascular:      Rate and Rhythm: Normal rate and regular rhythm.      Pulses:           Dorsalis pedis pulses are 3+ on the right side and 3+ on the left side.      Heart sounds: Normal heart sounds. No murmur heard.  Pulmonary:      Effort: Pulmonary effort is normal. No respiratory distress.      Breath sounds: Normal breath sounds. No wheezing.   Abdominal:      General: Bowel sounds are normal. There is no distension.      Palpations: Abdomen is soft. There is no mass.      Tenderness: There is no abdominal tenderness. There is no guarding or rebound.   Musculoskeletal:         General: No swelling or tenderness. Normal range of motion.      Cervical back: Normal range of motion and neck supple. No tenderness.      Comments: She is ambulatory without problems.   Feet:      Right  foot:      Protective Sensation: 5 sites tested.  5 sites sensed.      Skin integrity: No ulcer or skin breakdown.      Left foot:      Protective Sensation:   5 sites sensed.      Skin integrity: No ulcer or skin breakdown.   Lymphadenopathy:      Cervical: No cervical adenopathy.   Neurological:      General: No focal deficit present.      Mental Status: She is alert and oriented to person, place, and time.   Psychiatric:         Mood and Affect: Mood normal.         Behavior: Behavior normal.         Thought Content: Thought content normal.         Judgment: Judgment normal.         ASSESSMENT:  1. Essential hypertension    2. Prediabetes    3. Arthritis of back    4. Arthritis of knee    5. Postmenopausal    6. Vitamin D deficiency    7. Morbid obesity with BMI of 50.0-59.9, adult        PLAN:  Jenna was seen today for hypertension, pre-diabetes and follow-up.    Diagnoses and all orders for this visit:    Essential hypertension  -     Comprehensive Metabolic Panel; Future    Prediabetes  -     Hemoglobin A1C; Future  -     Microalbumin/Creatinine Ratio, Urine; Future    Arthritis of back    Arthritis of knee    Postmenopausal    Vitamin D deficiency    Morbid obesity with BMI of 50.0-59.9, adult      Patient advised to call for results.  Keep follow up with specialists.  Patient declines flu shot today.  Follow up in about 23 weeks (around 7/9/2024) for physical.

## 2024-02-06 ENCOUNTER — PATIENT MESSAGE (OUTPATIENT)
Dept: ADMINISTRATIVE | Facility: HOSPITAL | Age: 54
End: 2024-02-06
Payer: COMMERCIAL

## 2024-02-06 DIAGNOSIS — Z12.11 COLON CANCER SCREENING: ICD-10-CM

## 2024-02-15 ENCOUNTER — PATIENT MESSAGE (OUTPATIENT)
Dept: FAMILY MEDICINE | Facility: CLINIC | Age: 54
End: 2024-02-15
Payer: COMMERCIAL

## 2024-02-16 ENCOUNTER — TELEPHONE (OUTPATIENT)
Dept: FAMILY MEDICINE | Facility: CLINIC | Age: 54
End: 2024-02-16

## 2024-02-16 NOTE — TELEPHONE ENCOUNTER
Patient sent over the following my chart message.        On 2/6/24 I received a message thru my chart indicating a Fit Kit was being sent to me. I was in the process of determining a doctor I would like to have my colonoscopy with. Do you know if my insurance was billed for the Fit Kit? I'd like to make sure [facility] insurance will pay for my colonoscopy and won't give me any trouble if the kit was sent and [facility] was charged.

## 2024-02-22 ENCOUNTER — PATIENT MESSAGE (OUTPATIENT)
Dept: FAMILY MEDICINE | Facility: CLINIC | Age: 54
End: 2024-02-22
Payer: COMMERCIAL

## 2024-03-02 ENCOUNTER — OFFICE VISIT (OUTPATIENT)
Dept: URGENT CARE | Facility: CLINIC | Age: 54
End: 2024-03-02
Payer: COMMERCIAL

## 2024-03-02 VITALS
HEIGHT: 65 IN | OXYGEN SATURATION: 98 % | WEIGHT: 293 LBS | TEMPERATURE: 98 F | BODY MASS INDEX: 48.82 KG/M2 | HEART RATE: 67 BPM | DIASTOLIC BLOOD PRESSURE: 80 MMHG | RESPIRATION RATE: 19 BRPM | SYSTOLIC BLOOD PRESSURE: 122 MMHG

## 2024-03-02 DIAGNOSIS — Z01.30 BLOOD PRESSURE CHECK: Primary | ICD-10-CM

## 2024-03-02 DIAGNOSIS — R00.2 PALPITATIONS: ICD-10-CM

## 2024-03-02 PROCEDURE — 93005 ELECTROCARDIOGRAM TRACING: CPT | Mod: S$GLB,,, | Performed by: NURSE PRACTITIONER

## 2024-03-02 PROCEDURE — 93010 ELECTROCARDIOGRAM REPORT: CPT | Mod: S$GLB,,, | Performed by: INTERNAL MEDICINE

## 2024-03-02 PROCEDURE — 99214 OFFICE O/P EST MOD 30 MIN: CPT | Mod: S$GLB,,, | Performed by: NURSE PRACTITIONER

## 2024-03-02 NOTE — PATIENT INSTRUCTIONS
Continue with home monitoring of BP;   Larger cuff      Mindfulness meditation as you go through health/illness journey with your Aunt Ita     Follow up as needed for BP checks or home machine monitoring;      Any weakness, shortness of breath or chest pain go to local ER for hospital care

## 2024-03-02 NOTE — PROGRESS NOTES
"Subjective:      Patient ID: Jenna Ovalle is a 53 y.o. female.    Vitals:  height is 5' 5" (1.651 m) and weight is 145.2 kg (320 lb) (abnormal). Her temperature is 98.3 °F (36.8 °C). Her blood pressure is 122/80 and her pulse is 67. Her respiration is 19 and oxygen saturation is 98%.     Chief Complaint: Hypertension    Patient states she been having a discomfort in her upper right back side as well as in the chest area on the right side. Patient has hx of mitral valve regurgitation mild to moderate; Established with cardiologist.  Has had some stress related to family member pending cancer dx/tx planning. Recalls positive relaxation after doing stretching and meditation exercises.Last PM  /60 so she skipped pm dose of her BP med;  11am  morning diastolic was 100 so she resumed her bp meds.  Denied SOB or chest pain     Hypertension  This is a new problem. The current episode started yesterday. The problem is unchanged. The problem is controlled. Associated symptoms include anxiety, malaise/fatigue and palpitations. Pertinent negatives include no blurred vision, chest pain, headaches, neck pain, orthopnea, peripheral edema, PND, shortness of breath or sweats. (Chest discomfort) There are no associated agents to hypertension. There are no known risk factors for coronary artery disease. Past treatments include nothing.     Neck: Negative for neck pain.   Cardiovascular:  Positive for palpitations. Negative for chest pain.   Eyes:  Negative for blurred vision.   Respiratory:  Negative for shortness of breath.    Neurological:  Negative for headaches.      Objective:     Vitals:    03/02/24 1647   BP: 122/80   Pulse: 67   Resp: 19   Temp: 98.3 °F (36.8 °C)   SpO2: 98%   Weight: (Abnormal) 145.2 kg (320 lb)   Height: 5' 5" (1.651 m)        Physical Exam   Constitutional: She is oriented to person, place, and time. She appears well-developed. She is cooperative.  Non-toxic appearance. She does not appear " ill. No distress.   HENT:   Head: Normocephalic and atraumatic.   Ears:   Right Ear: Hearing normal.   Left Ear: Hearing normal.   Nose: Nose normal. No mucosal edema, rhinorrhea or nasal deformity. No epistaxis. Right sinus exhibits no maxillary sinus tenderness and no frontal sinus tenderness. Left sinus exhibits no maxillary sinus tenderness and no frontal sinus tenderness.   Mouth/Throat: Uvula is midline, oropharynx is clear and moist and mucous membranes are normal. No trismus in the jaw. Normal dentition. No uvula swelling. No posterior oropharyngeal erythema.   Eyes: Conjunctivae and lids are normal. Right eye exhibits no discharge. Left eye exhibits no discharge. No scleral icterus.   Neck: Trachea normal and phonation normal. Neck supple.   Cardiovascular: Normal rate, regular rhythm, normal heart sounds and normal pulses.      Comments: 54-66 in office    Pulmonary/Chest: Effort normal and breath sounds normal. No respiratory distress.   Abdominal: Normal appearance. She exhibits no distension and no mass. There is no abdominal tenderness.   Musculoskeletal: Normal range of motion.         General: No deformity. Normal range of motion.   Neurological: no focal deficit. She is alert and oriented to person, place, and time. She exhibits normal muscle tone. Coordination normal.   Skin: Skin is warm, dry, intact, not diaphoretic and not pale. Capillary refill takes less than 2 seconds.   Psychiatric: Her speech is normal and behavior is normal. Judgment and thought content normal.   Nursing note and vitals reviewed.      Assessment:     1. Blood pressure check    2. Palpitations      Ekg as reviewed by me  54-66 normal rate and rhythm. No noted pvcs, no st ot wave elevations or depressions     Plan:   Patient stable for discharge and home management of condition      Blood pressure check    Palpitations        Patient Instructions   Continue with home monitoring of BP;   Larger cuff      Mindfulness  meditation as you go through health/illness journey with your Aunt Ita     Follow up as needed for BP checks or home machine monitoring;      Any weakness, shortness of breath or chest pain go to local ER for hospital care

## 2024-03-04 DIAGNOSIS — E55.9 VITAMIN D DEFICIENCY: ICD-10-CM

## 2024-03-05 ENCOUNTER — PATIENT MESSAGE (OUTPATIENT)
Dept: FAMILY MEDICINE | Facility: CLINIC | Age: 54
End: 2024-03-05
Payer: COMMERCIAL

## 2024-03-05 RX ORDER — ERGOCALCIFEROL 1.25 MG/1
50000 CAPSULE ORAL
Qty: 12 CAPSULE | Refills: 3 | Status: SHIPPED | OUTPATIENT
Start: 2024-03-05

## 2024-03-05 NOTE — TELEPHONE ENCOUNTER
Care Due:                  Date            Visit Type   Department     Provider  --------------------------------------------------------------------------------                                EP -                              PRIMARY      [facility]  Last Visit: 01-      CARE ([facility])   MEDICINE       Franca Richmond                              EP -                              PRIMARY      [facility]  Next Visit: 07-      CARE ([facility])   MEDICINE       Franca Richmond                                                            Last  Test          Frequency    Reason                     Performed    Due Date  --------------------------------------------------------------------------------    CMP.........  12 months..  ergocalciferol, metFORMIN  Not Found    Overdue    HBA1C.......  6 months...  metFORMIN................  08-   02-    Vitamin D...  12 months..  ergocalciferol...........  Not Found    Overdue    [employer] Embedded Care Due Messages. Reference number: [id number].   3/04/2024 7:25:19 PM CST

## 2024-03-07 LAB
OHS QRS DURATION: 88 MS
OHS QTC CALCULATION: 411 MS

## 2024-03-12 ENCOUNTER — TELEPHONE (OUTPATIENT)
Dept: FAMILY MEDICINE | Facility: CLINIC | Age: 54
End: 2024-03-12
Payer: COMMERCIAL

## 2024-03-12 ENCOUNTER — OFFICE VISIT (OUTPATIENT)
Dept: DERMATOLOGY | Facility: CLINIC | Age: 54
End: 2024-03-12
Payer: COMMERCIAL

## 2024-03-12 VITALS — WEIGHT: 293 LBS | HEIGHT: 65 IN | BODY MASS INDEX: 48.82 KG/M2

## 2024-03-12 DIAGNOSIS — L68.0 HIRSUTISM: Primary | ICD-10-CM

## 2024-03-12 PROCEDURE — 99213 OFFICE O/P EST LOW 20 MIN: CPT | Mod: S$GLB,,, | Performed by: STUDENT IN AN ORGANIZED HEALTH CARE EDUCATION/TRAINING PROGRAM

## 2024-03-12 PROCEDURE — G2211 COMPLEX E/M VISIT ADD ON: HCPCS | Mod: S$GLB,,, | Performed by: STUDENT IN AN ORGANIZED HEALTH CARE EDUCATION/TRAINING PROGRAM

## 2024-03-12 PROCEDURE — 3008F BODY MASS INDEX DOCD: CPT | Mod: CPTII,S$GLB,, | Performed by: STUDENT IN AN ORGANIZED HEALTH CARE EDUCATION/TRAINING PROGRAM

## 2024-03-12 PROCEDURE — 1159F MED LIST DOCD IN RCRD: CPT | Mod: CPTII,S$GLB,, | Performed by: STUDENT IN AN ORGANIZED HEALTH CARE EDUCATION/TRAINING PROGRAM

## 2024-03-12 PROCEDURE — 99999 PR PBB SHADOW E&M-EST. PATIENT-LVL III: CPT | Mod: PBBFAC,,, | Performed by: STUDENT IN AN ORGANIZED HEALTH CARE EDUCATION/TRAINING PROGRAM

## 2024-03-12 PROCEDURE — 3072F LOW RISK FOR RETINOPATHY: CPT | Mod: CPTII,S$GLB,, | Performed by: STUDENT IN AN ORGANIZED HEALTH CARE EDUCATION/TRAINING PROGRAM

## 2024-03-12 PROCEDURE — 1160F RVW MEDS BY RX/DR IN RCRD: CPT | Mod: CPTII,S$GLB,, | Performed by: STUDENT IN AN ORGANIZED HEALTH CARE EDUCATION/TRAINING PROGRAM

## 2024-03-12 NOTE — PROGRESS NOTES
Subjective:       Patient ID:  Jenna Ovalle is a 53 y.o. female who presents for No chief complaint on file.    History of Present Illness: The patient presents for follow up of hirsutism, last seen on 10/5/23, started on topical eflornithine and tretinoin for hairs under the chin and neck. Reports that she has noticed having to shave/wax less often than previous with hairs not seeming as coarse as before. No irritation noted.            Review of Systems   Constitutional:  Negative for fever and chills.   Skin:  Negative for itching, rash and dry skin.        Objective:    Physical Exam   Constitutional: She appears well-developed and well-nourished. No distress.   Neurological: She is alert and oriented to person, place, and time. She is not disoriented.   Psychiatric: She has a normal mood and affect.   Skin:   Areas Examined (abnormalities noted in diagram):   Head / Face Inspection Performed  Neck Inspection Performed              Diagram Legend     Erythematous scaling macule/papule c/w actinic keratosis       Vascular papule c/w angioma      Pigmented verrucoid papule/plaque c/w seborrheic keratosis      Yellow umbilicated papule c/w sebaceous hyperplasia      Irregularly shaped tan macule c/w lentigo     1-2 mm smooth white papules consistent with Milia      Movable subcutaneous cyst with punctum c/w epidermal inclusion cyst      Subcutaneous movable cyst c/w pilar cyst      Firm pink to brown papule c/w dermatofibroma      Pedunculated fleshy papule(s) c/w skin tag(s)      Evenly pigmented macule c/w junctional nevus     Mildly variegated pigmented, slightly irregular-bordered macule c/w mildly atypical nevus      Flesh colored to evenly pigmented papule c/w intradermal nevus       Pink pearly papule/plaque c/w basal cell carcinoma      Erythematous hyperkeratotic cursted plaque c/w SCC      Surgical scar with no sign of skin cancer recurrence      Open and closed comedones      Inflammatory  papules and pustules      Verrucoid papule consistent consistent with wart     Erythematous eczematous patches and plaques     Dystrophic onycholytic nail with subungual debris c/w onychomycosis     Umbilicated papule    Erythematous-base heme-crusted tan verrucoid plaque consistent with inflamed seborrheic keratosis     Erythematous Silvery Scaling Plaque c/w Psoriasis     See annotation      Assessment / Plan:        Hirsutism - doing well with topical eflornithine and tretinoin nightly. Discussed laser treatment options and other intervention going forward. Continue current regimen at this time.              Follow up in about 6 months (around 9/12/2024).

## 2024-03-12 NOTE — TELEPHONE ENCOUNTER
Asked pt to pls refax [facility] paper work. Pt said yes she will to today as soon as she can.

## 2024-03-14 ENCOUNTER — PATIENT MESSAGE (OUTPATIENT)
Dept: FAMILY MEDICINE | Facility: CLINIC | Age: 54
End: 2024-03-14
Payer: COMMERCIAL

## 2024-03-15 ENCOUNTER — TELEPHONE (OUTPATIENT)
Dept: FAMILY MEDICINE | Facility: CLINIC | Age: 54
End: 2024-03-15
Payer: COMMERCIAL

## 2024-03-25 ENCOUNTER — PATIENT MESSAGE (OUTPATIENT)
Dept: FAMILY MEDICINE | Facility: CLINIC | Age: 54
End: 2024-03-25
Payer: COMMERCIAL

## 2024-03-25 ENCOUNTER — TELEPHONE (OUTPATIENT)
Dept: FAMILY MEDICINE | Facility: CLINIC | Age: 54
End: 2024-03-25
Payer: COMMERCIAL

## 2024-03-25 NOTE — TELEPHONE ENCOUNTER
"Gm,   I received the completed fmla document and have turned it in to HR at work. They are fine with all parts completed, but it does appear that with the redesigned form there's an area that I overlooked and will need. HR states they only need a simple statement with medical letterhead indicating "FMLA-self for Jethro Ovalle begins 3/23/24."   I can  the letter this week. Also, if it it can be scanned into My Chart, I may be able to try printing it to give to HR.     Thank you   "

## 2024-04-16 ENCOUNTER — PATIENT MESSAGE (OUTPATIENT)
Dept: FAMILY MEDICINE | Facility: CLINIC | Age: 54
End: 2024-04-16
Payer: COMMERCIAL

## 2024-04-17 ENCOUNTER — TELEPHONE (OUTPATIENT)
Dept: PHARMACY | Facility: CLINIC | Age: 54
End: 2024-04-17
Payer: COMMERCIAL

## 2024-04-17 RX ORDER — SEMAGLUTIDE 1.34 MG/ML
1 INJECTION, SOLUTION SUBCUTANEOUS
COMMUNITY
End: 2024-05-14

## 2024-05-06 ENCOUNTER — PATIENT MESSAGE (OUTPATIENT)
Dept: DERMATOLOGY | Facility: CLINIC | Age: 54
End: 2024-05-06
Payer: COMMERCIAL

## 2024-06-02 ENCOUNTER — OFFICE VISIT (OUTPATIENT)
Dept: URGENT CARE | Facility: CLINIC | Age: 54
End: 2024-06-02
Payer: COMMERCIAL

## 2024-06-02 VITALS
HEIGHT: 65 IN | SYSTOLIC BLOOD PRESSURE: 123 MMHG | BODY MASS INDEX: 48.82 KG/M2 | WEIGHT: 293 LBS | TEMPERATURE: 99 F | HEART RATE: 71 BPM | DIASTOLIC BLOOD PRESSURE: 68 MMHG | RESPIRATION RATE: 14 BRPM | OXYGEN SATURATION: 97 %

## 2024-06-02 DIAGNOSIS — R09.82 POSTNASAL DISCHARGE: ICD-10-CM

## 2024-06-02 DIAGNOSIS — R09.81 SINUS CONGESTION: Primary | ICD-10-CM

## 2024-06-02 DIAGNOSIS — J06.9 UPPER RESPIRATORY TRACT INFECTION, UNSPECIFIED TYPE: ICD-10-CM

## 2024-06-02 DIAGNOSIS — R05.9 COUGH, UNSPECIFIED TYPE: ICD-10-CM

## 2024-06-02 PROBLEM — G47.33 OBSTRUCTIVE SLEEP APNEA OF ADULT: Status: ACTIVE | Noted: 2024-06-02

## 2024-06-02 LAB
CTP QC/QA: YES
SARS-COV-2 AG RESP QL IA.RAPID: NEGATIVE

## 2024-06-02 PROCEDURE — 87811 SARS-COV-2 COVID19 W/OPTIC: CPT | Mod: QW,S$GLB,, | Performed by: NURSE PRACTITIONER

## 2024-06-02 PROCEDURE — 99213 OFFICE O/P EST LOW 20 MIN: CPT | Mod: S$GLB,,, | Performed by: NURSE PRACTITIONER

## 2024-06-02 RX ORDER — CHLORPHENIRAMINE MALEATE AND DEXTROMETHORPHAN HYDROBROMIDE 4; 30 MG/1; MG/1
1 TABLET, FILM COATED ORAL
Qty: 24 EACH | Refills: 0 | Status: SHIPPED | OUTPATIENT
Start: 2024-06-02 | End: 2024-06-12

## 2024-06-02 RX ORDER — BENZONATATE 200 MG/1
200 CAPSULE ORAL 3 TIMES DAILY PRN
Qty: 25 CAPSULE | Refills: 0 | Status: SHIPPED | OUTPATIENT
Start: 2024-06-02 | End: 2024-06-12

## 2024-06-02 NOTE — LETTER
June 2, 2024      [facility]  [address], [address]  [place] [address]  Phone: [phone]  Fax: [phone]       Patient: Jenna Ovalle   YOB: 1970  Date of Visit: 06/02/2024    To Whom It May Concern:    Saul Ovalle  was at [facility] on 06/02/2024. The patient may return to work/school on 06/04/24 with no restrictions. If you have any questions or concerns, or if I can be of further assistance, please do not hesitate to contact me.    Sincerely,      Azul Tinoco, NP

## 2024-06-02 NOTE — PROGRESS NOTES
"Subjective:      Patient ID: Jenna Ovalle is a 54 y.o. female.    Vitals:  height is 5' 5" (1.651 m) and weight is 143.4 kg (316 lb 2.2 oz) (abnormal). Her temperature is 98.8 °F (37.1 °C). Her blood pressure is 123/68 and her pulse is 71. Her respiration is 14 and oxygen saturation is 97%.     Chief Complaint: Sore Throat    Patient is a 54-year-old female who presents evaluation of sinus congestion with associated postnasal drainage and cough.  Treating with Zyrtec without improvement.  Onset Friday.  Denies fever, chills, dyspnea, wheezing, vomiting, diarrhea or rash.  No recent travel or sick contacts reported.  No other concerns voiced.    Sore Throat   This is a new problem. The current episode started in the past 7 days. The problem has been gradually worsening. Associated symptoms include congestion, coughing and headaches. Pertinent negatives include no diarrhea, ear pain, shortness of breath, stridor or vomiting. Trouble swallowing: uncomfortable when swallowing.The treatment provided no relief.       Constitution: Positive for chills. Negative for fever.   HENT:  Positive for congestion, postnasal drip, sinus pressure and sore throat. Negative for ear pain. Trouble swallowing: uncomfortable when swallowing.   Respiratory:  Positive for cough. Negative for sputum production, shortness of breath, stridor and wheezing.    Gastrointestinal:  Negative for vomiting and diarrhea.   Skin:  Negative for rash.   Neurological:  Positive for headaches.      Objective:     Physical Exam   Constitutional: She is oriented to person, place, and time. She appears well-developed. She is cooperative.  Non-toxic appearance. She does not appear ill. No distress.   HENT:   Head: Normocephalic and atraumatic.   Ears:   Right Ear: Hearing and external ear normal.   Left Ear: Hearing and external ear normal.   Nose: Congestion present. No mucosal edema, rhinorrhea or nasal deformity. No epistaxis. Right sinus exhibits no " maxillary sinus tenderness and no frontal sinus tenderness. Left sinus exhibits no maxillary sinus tenderness and no frontal sinus tenderness.   Mouth/Throat: Uvula is midline and mucous membranes are normal. No trismus in the jaw. Normal dentition. No uvula swelling. Posterior oropharyngeal erythema and cobblestoning present. No oropharyngeal exudate or posterior oropharyngeal edema.   Eyes: Conjunctivae and lids are normal. No scleral icterus.   Neck: Trachea normal and phonation normal. Neck supple. No edema present. No erythema present. No neck rigidity present.   Cardiovascular: Normal rate, regular rhythm, normal heart sounds and normal pulses.   Pulmonary/Chest: Effort normal and breath sounds normal. No respiratory distress. She has no decreased breath sounds. She has no wheezes. She has no rhonchi. She has no rales.   Abdominal: Normal appearance.   Musculoskeletal: Normal range of motion.         General: No deformity. Normal range of motion.   Neurological: She is alert and oriented to person, place, and time. She exhibits normal muscle tone. Coordination normal.   Skin: Skin is warm, dry, intact, not diaphoretic and not pale.   Psychiatric: Her speech is normal and behavior is normal. Judgment and thought content normal.   Nursing note and vitals reviewed.      Assessment:     1. Sinus congestion    2. Cough, unspecified type    3. Postnasal discharge    4. Upper respiratory tract infection, unspecified type        Plan:       Sinus congestion  -     SARS Coronavirus 2 Antigen, POCT Manual Read    Cough, unspecified type    Postnasal discharge    Upper respiratory tract infection, unspecified type    Other orders  -     chlorpheniramine-dextromethorp (CORICIDIN HBP COUGH AND COLD) 4-30 mg Tab; Take 1 tablet by mouth every 6 (six) hours while awake. for 10 days  Dispense: 24 each; Refill: 0  -     benzonatate (TESSALON) 200 MG capsule; Take 1 capsule (200 mg total) by mouth 3 (three) times daily as needed  for Cough.  Dispense: 25 capsule; Refill: 0          Medical Decision Making:   Initial Assessment:   Nontoxic appearing 55 yo female c/o congestion.  After complete evaluation, including thorough history and physical exam, the patient's symptoms are most likely due to viral upper respiratory infection. There are no concerning features on physical exam to suggest bacterial otitis media/externa, sinusitis, strep pharyngitis, or peritonsillar abscess. Vital signs do not suggest sepsis. Lung sounds are clear and not consistent with pneumonia. There is no neck pain or limited ROM to suggest retropharyngeal abscess or meningitis. In clinic testing for covid is negative.The patient will be treated with supportive care. Will provide RX for tessalon upon D/C. Follow up instructions and ED precautions provided.          Results for orders placed or performed in visit on 06/02/24   SARS Coronavirus 2 Antigen, POCT Manual Read   Result Value Ref Range    SARS Coronavirus 2 Antigen Negative Negative     Acceptable Yes      Patient Instructions   A cold is caused by a virus that can settle in your nose, throat or lungs. This causesa runny or stuffy nose and sneezing. You may also have a sore throat, cough, headache, fever and muscle aches. Different cold viruses last different lengthsof time, but the average time is 2 to 14 days.    Seek immediate medical care if you develop fever, chest pain, or shortness of breath.     Treatment: There is no cure for the common cold, there is only symptomatic care.     Antibiotics may be used to treat signs of a secondary infection, but they do not treat  the cold virus. Try these tips to keep yourself comfortable:                   -Get plenty of rest.                   -Drink plenty of fluids, at least 8 large glasses of fluid a day. Good fluid choices are water, fruit juices high in Vitamin C, tea, gelatin, or broths and soups. These help to keep mucus thin and ease  congestion.                  -Use salt water gargle, cough drops or throat sprays to relieve throat pain. Mi ¼ to ½ teaspoon of salt in 1 cup of warm water for a salt water gargle  solution.                  -Use petroleum jelly or lip balm around lips and nose to prevent chapping.                  -Use saline nose drops or spray to help ease congestion.    Over the Counter (OTC) Medicines:  Take over the counter medicines as needed to ease your signs.  Read labels carefully.  Use a product that treats only the signs that you have. Ask your pharmacist  for recommendations. Be sure to ask about possible interactions with other  medicines you are taking.  Common medicines used to treat signs of a cold include:     -Flonase daily.  -Claritin or Zyrtec daily.  -Mucinex every 12 hours -- Drink plenty of water while taking this medication.    - Cough suppressant, also called antitussive, such as dextromethorphan.  This medicine decreases your reflex and sensitivity to cough. This  medicine may be kept behind the pharmacy counter for purchase.    Cold and cough medicines often contain more than one type of medicine.  Ask the pharmacist for help to confirm that you are not using more than one  product with the same or similar ingredient. For example, some cold and  cough medicines have acetaminophen or ibuprofen in them to help lower a  fever or ease muscle aches. Do not take extra acetaminophen (Tylenol) or  ibuprofen (Advil, Motrin) if the cold or cough medicine has it as an  ingredient. Too much medicine could be harmful.    Take the correct dose as listed on the package. Do not take more than  recommended.    Use a Humidifier:  A cool mist humidifier can make breathing easier by thinning mucus. Do not use  a steam humidifier as hot water can cause burns if spilled.  Place the humidifier a few feet from the bed. Drain and clean each day with  soap and water to prevent bacteria and mold from growing.  Indoor humidity  should not be above 50%. Stop using the humidifier if you  notice moisture on windows, walls or pictures.  You do not need to add any medicine to the humidifier.  If you cannot get a humidifier, place a pan of water next to heating vents and  refill the water level daily. The water will evaporate and add moisture to the  Room.    How to prevent the spread of colds  -Wash your hands with soap and water or use alcohol based hand   often. Dry hands wet from washing with soap on a paper towel instead of cloth towel.  -Cough or sneeze into your elbow to avoid spreading germs.  -Wipe down common surfaces, such as door knobs and faucet handles, with a disinfectant spray.  -Do not share cups or utensils.        Please follow up with your Primary care provider within 2-5 days if your signs and symptoms have not resolved or worsen.      If your condition worsens or fails to improve we recommend that you receive another evaluation at the emergency room immediately or contact your primary medical clinic to discuss your concerns.   You must understand that you have received an Urgent Care treatment only and that you may be released before all of your medical problems are known or treated. You, the patient, will arrange for follow up care as instructed.

## 2024-06-04 ENCOUNTER — OFFICE VISIT (OUTPATIENT)
Dept: FAMILY MEDICINE | Facility: CLINIC | Age: 54
End: 2024-06-04
Payer: COMMERCIAL

## 2024-06-04 ENCOUNTER — TELEPHONE (OUTPATIENT)
Dept: FAMILY MEDICINE | Facility: CLINIC | Age: 54
End: 2024-06-04

## 2024-06-04 VITALS
HEIGHT: 65 IN | RESPIRATION RATE: 18 BRPM | WEIGHT: 293 LBS | BODY MASS INDEX: 48.82 KG/M2 | TEMPERATURE: 98 F | DIASTOLIC BLOOD PRESSURE: 68 MMHG | SYSTOLIC BLOOD PRESSURE: 108 MMHG | HEART RATE: 96 BPM | OXYGEN SATURATION: 97 %

## 2024-06-04 DIAGNOSIS — J06.9 VIRAL URI WITH COUGH: Primary | ICD-10-CM

## 2024-06-04 PROCEDURE — 99213 OFFICE O/P EST LOW 20 MIN: CPT | Mod: S$GLB,,, | Performed by: REGISTERED NURSE

## 2024-06-04 PROCEDURE — 3074F SYST BP LT 130 MM HG: CPT | Mod: CPTII,S$GLB,, | Performed by: REGISTERED NURSE

## 2024-06-04 PROCEDURE — 3008F BODY MASS INDEX DOCD: CPT | Mod: CPTII,S$GLB,, | Performed by: REGISTERED NURSE

## 2024-06-04 PROCEDURE — 99999 PR PBB SHADOW E&M-EST. PATIENT-LVL III: CPT | Mod: PBBFAC,,, | Performed by: REGISTERED NURSE

## 2024-06-04 PROCEDURE — 3078F DIAST BP <80 MM HG: CPT | Mod: CPTII,S$GLB,, | Performed by: REGISTERED NURSE

## 2024-06-04 NOTE — PROGRESS NOTES
SUBJECTIVE:     Jenna Ovalle  MRN:  [id number]  54 y.o. female    CHIEF COMPLAINT:     Cough    HPI:    Jenna Ovalle reports cough and cold issues since Friday 5/31/24.  Seen at [facility] on 6/2/24, covid negative.  Tx with Tessalon and Coricidin-HBP as directed.  Dx with acute viral URI.  Felt a little better, went back to work today but put in 1/2 day.  The cough has been productive, mostly clear to white in color, w/out sob or wheezing.      Review of Systems   Constitutional:  Positive for malaise/fatigue. Negative for chills, diaphoresis, fever and weight loss.   HENT:  Positive for congestion and sore throat. Negative for ear discharge, ear pain, nosebleeds and sinus pain.         + pnd   Respiratory:  Positive for cough and sputum production. Negative for hemoptysis, shortness of breath and wheezing.    Cardiovascular: Negative.  Negative for chest pain.   Gastrointestinal:  Negative for heartburn.   Musculoskeletal:  Positive for myalgias.   Neurological:  Positive for headaches. Negative for dizziness, tingling and tremors.   Endo/Heme/Allergies:  Positive for environmental allergies (using Flonase at times).       Review of patient's allergies indicates:   Allergen Reactions    No known drug allergies        Patient Active Problem List   Diagnosis    Essential hypertension    AR (allergic rhinitis)    History of pulmonary embolism    Morbid obesity with BMI of 50.0-59.9, adult    Arthritis of back    Prediabetes    Arthritis of knee    Vitamin D deficiency    Exotropia of left eye    Thoracic radiculitis    Thoracic back pain    Uterine leiomyoma    Postmenopausal bleeding    Adjustment disorder with mixed anxiety and depressed mood    Postmenopausal    Family history of malignant neoplasm of breast    Obstructive sleep apnea of adult       Current Outpatient Medications   Medication Instructions    aspirin (ECOTRIN) 81 mg, Daily    benzonatate (TESSALON) 200 mg, Oral, 3 times daily PRN     "carvediloL (COREG) 3.125 mg, Oral, 2 times daily    cetirizine (ZYRTEC) 10 MG tablet Oral, Daily PRN    chlorpheniramine-dextromethorp (CORICIDIN HBP COUGH AND COLD) 4-30 mg Tab 1 tablet, Oral, Every 6 hours while awake    eflornithine (VANIQA) 13.9 % cream Topical (Top), 2 times daily    ergocalciferol (ERGOCALCIFEROL) 50,000 Units, Oral, Every 7 days    metFORMIN (GLUCOPHAGE-XR) 500 MG ER 24hr tablet TAKE 3 TABLETS BY MOUTH EVERY DAY    nystatin (MYCOSTATIN) powder Topical (Top), 2 times daily, One bottle    tretinoin (RETIN-A) 0.025 % cream Topical (Top), Nightly         Past medical, surgical, family and social histories have been reviewed today.      OBJECTIVE:     Vitals:    06/04/24 1346   BP: 108/68   Pulse: 96   Resp: 18   Temp: 97.7 °F (36.5 °C)   TempSrc: Tympanic   SpO2: 97%   Weight: (!) 141.3 kg (311 lb 6.4 oz)   Height: 5' 5" (1.651 m)   PainSc:   5   PainLoc: Head       Physical Exam  Vitals reviewed.   Constitutional:       General: She is not in acute distress.  HENT:      Head: Normocephalic and atraumatic.      Right Ear: Tympanic membrane normal.      Left Ear: Tympanic membrane normal.      Nose: No congestion or rhinorrhea.      Mouth/Throat:      Mouth: Mucous membranes are moist.      Pharynx: Oropharynx is clear. No oropharyngeal exudate or posterior oropharyngeal erythema.   Eyes:      Conjunctiva/sclera: Conjunctivae normal.      Pupils: Pupils are equal, round, and reactive to light.   Cardiovascular:      Rate and Rhythm: Normal rate and regular rhythm.   Pulmonary:      Effort: Pulmonary effort is normal.      Breath sounds: Normal breath sounds.   Lymphadenopathy:      Cervical: No cervical adenopathy.   Neurological:      Mental Status: She is alert and oriented to person, place, and time.         ASSESSMENT:     1. Viral URI with cough ---- reports some improvement from onset to present      PLAN:     Supportive care, rest, hydration, Vitamin-C.  Work note provided to return " tomorrow, to put in 4 to 5 hrs of work to allow for some rest.  Monitor.  Contact office back if s/s worsen or linger.        YAHIR Campbell  [facility]       20 minutes of total time spent on the encounter, which includes face to face time and non-face to face time preparing to see the patient.  This includes obtaining and/or reviewing separately obtained history, performing a medically appropriate examination and/or evaluation, and counseling and educating the patient/family/caregiver.  Includes documenting clinical information in the electronic or other health record, independently interpreting results (not separately reported) and communicating results to the patient/family/caregiver, with care coordination (not separately reported).  Medications, tests and/or procedures ordered as necessary along with referring and communicating with other health professionals (when not separately reported).

## 2024-06-13 ENCOUNTER — TELEPHONE (OUTPATIENT)
Dept: PHARMACY | Facility: CLINIC | Age: 54
End: 2024-06-13
Payer: COMMERCIAL

## 2024-06-20 ENCOUNTER — PROCEDURE VISIT (OUTPATIENT)
Dept: DERMATOLOGY | Facility: CLINIC | Age: 54
End: 2024-06-20
Payer: COMMERCIAL

## 2024-06-20 DIAGNOSIS — L68.0 HIRSUTISM: Primary | ICD-10-CM

## 2024-06-20 NOTE — PATIENT INSTRUCTIONS
Laser Post-Procedure Care:    You may experience swelling for 1 day to 1 week after your procedure. You may also experience redness that can last for 1 to 2 weeks. Purpura/bruising may also occur.    You should avoid direct sun exposure for 14 days after laser treatment. For the first week after the procedure, wash the skin twice daily using a gentle cleanser (i.e. Vanicream facial cleanser, CeraVe hydrating cleanser, CeraVe foaming cleanser, Cetaphil gentle facial cleanser, or Purpose cleanser). Avoid harsh cleansers (i.e. Dial) and cleansers that contain ingredients that may irritate the skin (i.e. salicylic acid, benzoyl peroxide, glycolic acid, etc.). Avoid exfoliation and motorized spinning brushes.  Apply a gentle moisturizer twice daily. Apply a sunscreen with SPF 30+ (preferably a mineral based sunscreen, such as with zinc oxide) on top of your gel/moisturizer in the morning. You may resume all prescription topical medications 1 week after your procedure, or sooner if otherwise indicated by your doctor.    If you have any questions or concerns, call [phone] or send a message with or without photos via [facility] to your laser provider.      LASER PRE-TREATMENT INSTRUCTIONS:  Your doctor is referring you for laser treatment (hair removal, vascular treatment, tattoo removal)  You should avoid intense or direct sun exposure for 7 days prior to your laser appointment and for 14 days after your laser treatment  For laser hair removal, you should avoid waxing or tweezing for 1 week prior to the laser appointment.  For areas with thick hair (ie - underarms and groin), you will need to shave the area 12-24 hours prior to your laser hair removal session  Skin should be clean and free of make-up, lotions, oils, deodorant, and sunless tanning products.  Products on the skin can increase the risk of adverse reactions from the laser  Skin that is sun-burned, peeling, inflammed, or otherwise damaged, cannot be  treated  Retinoid or retinol products should be discontinued 7 days prior to your laser appointment and can be resumed 7 days after your laser treatment or once the skin is healed   If you have any questions leading up to your laser treatment, feel free to call us at [phone] or message your provider via [facility]

## 2024-07-11 ENCOUNTER — OFFICE VISIT (OUTPATIENT)
Dept: FAMILY MEDICINE | Facility: CLINIC | Age: 54
End: 2024-07-11
Attending: FAMILY MEDICINE
Payer: COMMERCIAL

## 2024-07-11 VITALS
OXYGEN SATURATION: 99 % | SYSTOLIC BLOOD PRESSURE: 116 MMHG | RESPIRATION RATE: 18 BRPM | WEIGHT: 293 LBS | BODY MASS INDEX: 48.82 KG/M2 | HEART RATE: 57 BPM | HEIGHT: 65 IN | DIASTOLIC BLOOD PRESSURE: 64 MMHG | TEMPERATURE: 98 F

## 2024-07-11 DIAGNOSIS — I10 ESSENTIAL HYPERTENSION: ICD-10-CM

## 2024-07-11 DIAGNOSIS — R73.03 PREDIABETES: ICD-10-CM

## 2024-07-11 DIAGNOSIS — E55.9 VITAMIN D DEFICIENCY: ICD-10-CM

## 2024-07-11 DIAGNOSIS — M17.10 ARTHRITIS OF KNEE: ICD-10-CM

## 2024-07-11 DIAGNOSIS — G47.33 OBSTRUCTIVE SLEEP APNEA OF ADULT: ICD-10-CM

## 2024-07-11 DIAGNOSIS — Z00.00 ANNUAL PHYSICAL EXAM: Primary | ICD-10-CM

## 2024-07-11 DIAGNOSIS — B37.2 YEAST DERMATITIS: ICD-10-CM

## 2024-07-11 DIAGNOSIS — E66.01 MORBID OBESITY WITH BMI OF 50.0-59.9, ADULT: ICD-10-CM

## 2024-07-11 DIAGNOSIS — Z78.0 POSTMENOPAUSAL: ICD-10-CM

## 2024-07-11 PROCEDURE — 3008F BODY MASS INDEX DOCD: CPT | Mod: CPTII,S$GLB,, | Performed by: FAMILY MEDICINE

## 2024-07-11 PROCEDURE — 99999 PR PBB SHADOW E&M-EST. PATIENT-LVL IV: CPT | Mod: PBBFAC,,, | Performed by: FAMILY MEDICINE

## 2024-07-11 PROCEDURE — 3078F DIAST BP <80 MM HG: CPT | Mod: CPTII,S$GLB,, | Performed by: FAMILY MEDICINE

## 2024-07-11 PROCEDURE — 3074F SYST BP LT 130 MM HG: CPT | Mod: CPTII,S$GLB,, | Performed by: FAMILY MEDICINE

## 2024-07-11 PROCEDURE — 99396 PREV VISIT EST AGE 40-64: CPT | Mod: S$GLB,,, | Performed by: FAMILY MEDICINE

## 2024-07-11 RX ORDER — NYSTATIN 100000 [USP'U]/G
POWDER TOPICAL 2 TIMES DAILY
Qty: 60 G | Refills: 6 | Status: SHIPPED | OUTPATIENT
Start: 2024-07-11

## 2024-07-11 RX ORDER — SEMAGLUTIDE 2.68 MG/ML
2 INJECTION, SOLUTION SUBCUTANEOUS
COMMUNITY

## 2024-07-11 NOTE — PROGRESS NOTES
HISTORY OF PRESENT ILLNESS: Ms. Ovalle comes in today fasting and with taking medications for annual wellness examination. She reports no acute problems today.     END OF LIFE DECISION: She does not have a living will and does desire life support.    Current Outpatient Medications   Medication Sig    aspirin (ECOTRIN) 81 MG EC tablet Take 81 mg by mouth once daily.    carvediloL (COREG) 3.125 MG tablet Take 3.125 mg by mouth 2 (two) times daily.    cetirizine (ZYRTEC) 10 MG tablet Take by mouth daily as needed.    eflornithine (VANIQA) 13.9 % cream Apply topically 2 (two) times daily.    ergocalciferol (ERGOCALCIFEROL) 50,000 unit Cap TAKE 1 CAPSULE BY MOUTH EVERY 7 DAYS    metFORMIN (GLUCOPHAGE-XR) 500 MG ER 24hr tablet TAKE 3 TABLETS BY MOUTH EVERY DAY    nystatin (MYCOSTATIN) powder Apply topically 2 (two) times daily. One bottle    semaglutide (OZEMPIC) 2 mg/dose (8 mg/3 mL) PnIj Inject 2 mg into the skin every 7 days.    tretinoin (RETIN-A) 0.025 % cream Apply topically every evening.     SCREENINGS:   Cholesterol: August 15, 2023.  FFS/Colonoscopy: March 22, 2024 with Dr. Damaso Navarro with [facility] with 10-year repeat advised.  Mammogram: December 20, 2023 with GYN Dr. Sultana Daily - [place]. She also follows with breast surgeon Dr. Greg Nguyen for ever 6 month breast cancer screening with exam and ultrasound with last visit with SUMI Gaytan on December 20, 2023 with follow up scheduled for July 26, 2024 with MRI breast.   GYN Exam: March 14, 2024 with GYN Dr. Sultana hernandez but February 7, 2023 pap smear with GYN Dr. Sultana hernandez.  Dexa Scan: Never.  Eye Exam: Saw Dr. Olson on July 27, 2023. She wears glasses. Scheduled for September 5, 2024.  PPD: Negative in the past per patient.  Immunizations: Tdap - March 4, 2015.  Gardasil - N./A.  Zostavax - N./A.  Pneumovax - In the past.  Prevnar-20 shot - January 12, 2023.  Seasonal Flu - November 26, 2021.  Covid-19 (Moderna) vaccine  series - March 12, 2021, April 9, 2021, November 5, 2021.  Shingrix - December 16, 2022, March 3, 2023.     ROS:  GENERAL: Denies fever, chills or unusual weight change. Appetite normal. Exercises not recently. Reports monitors diet most times. Weight  145.3 kg (320 lb 5.3 oz) at January 30, 2024 visit. Reports occasional fatigue. Saw Dr. Philippe De La Torre, bariatric surgeon, ([facility]QU[facility] Armas) on May 2, 2024 as approved for bariatric surgery and now follows with dietician, psychiatry, pulmonology, and cardiology for clearance process.  SKIN: Denies rashes, itching, changes in mole, color or texture of skin or easy bruising. Reports wears compression stockings when sitting work.  HEAD: Denies headaches or recent head trauma.  EYES: Denies change in vision, pain, diplopia, redness or discharge. Wears glasses.  EARS: Denies ear pain, discharge, vertigo or decreased hearing.  NOSE: Denies loss of smell, epistaxis or rhinitis.  MOUTH & THROAT: Denies hoarseness or change in voice. Denies excessive gum bleeding or mouth sores. Denies sore throat.   NODES: Denies swollen glands.  CHEST: Denies ROSARIO, wheezing, cough or sputum production. Reports no longer follows with Dr. Mikel Marks, pulmonologist, for surveillance of OMAR on CPAP since May 20, 2024.   CARDIOVASCULAR: Denies chest pain, PND, orthopnea or reduced exercise tolerance. Denies palpitations. Saw Dr. Jun Bajwa, cardiologist with [facility] in [place] on June 25, 2024 for surveillance of hypertension, cardiomegaly, pulmonary hypertension. Reports performs home blood pressure checks once per month but not recently as often checked at doctor visits.   ABDOMEN: Denies unusual diarrhea (as reports occasionally following gall bladder removal > 10 years ago), nausea, vomiting, abdominal pain, constipation, or blood in stool.   URINARY: Denies flank pain, dysuria or hematuria.  GENITOURINARY: Denies flank pain, dysuria, frequency or hematuria. Occasionally performs  "monthly breast self examination. S/P hysterectomy in March 2023.  ENDOCRINE: Denies diabetes, thyroid or cholesterol problems. Reports follows every 3 months with Dr. Terence Jones with [facility] [place] and continues Ozempic.   HEME/LYMPH: Denies bleeding problems. Reports no longer follows with Dr. Mathew, hematologist, with last visit on October 5, 2021 for GABRIEL, vitamin D deficiency with 1-year follow up advised. No longer follows with Dr. Pascual, hematologist, > 4 years ago for surveillance of Lupus anticoagulant disorder; she is now only on EC ASA 81 mg daily since Xarelto was discontinued April 2014.  PERIPHERAL VASCULAR:Denies claudication or cyanosis. Reports had left lower leg vein procedure on July 9, 2024 with Dr. Hammad Naik, vein specialist.  MUSCULOSKELETAL: Denies joint stiffness, pain or swelling except reports intermittent non-traumatic pain at both of knees; follows with Dr. Minaya, orthopedist, annually for right knee gel injection with last visit in August 2022. Denies edema.   NEUROLOGIC: Denies history of seizures, tremors, paralysis, alteration of gait or coordination.  PSYCHIATRIC: Denies mood swings, depression, anxiety, homicidal or suicidal thoughts. Denies sleep problems.      PE:   VS: /64   Pulse (!) 57   Temp 97.5 °F (36.4 °C) (Tympanic)   Resp 18   Ht 5' 5" (1.651 m)   Wt (!) 143.2 kg (315 lb 11.2 oz)   LMP  (LMP Unknown)   SpO2 99%   BMI 52.54 kg/m²   APPEARANCE: Well nourished, well developed female, obese and pleasant, alert and oriented in no acute distress.   HEAD: Non tender. Full range of motion.  EYES: PERRL, conjunctiva pink, lids no edema. She wears glasses.  EARS: External canal patent, no swelling or redness. TM's shiny and clear.  NOSE: Mucosa and turbinates pink, not swollen. No discharge. Non tender sinuses.  THROAT: No pharyngeal erythema or exudate. No stridor.   NECK: Supple, no mass, thyroid not enlarged.  NODES: No cervical " lymph node enlargement.  CHEST: Normal respiratory effort. Lungs clear to auscultation.  CARDIOVASCULAR: Normal S1, S2. No rubs, murmurs or gallops. PMI not displaced. No carotid bruit. Pedal pulses palpable bilaterally. No edema.  ABDOMEN: Bowel sounds present. Not distended. Soft. No tenderness, masses or organomegaly.  BREAST EXAM: Not examined but deferred to GYN.  PELVIC EXAM: Not examined but deferred to GYN.  RECTAL EXAM: Not examined per patient request.  MUSCULOSKELETAL: No joint deformities or stiffness. She is ambulatory without problems.   SKIN: No rashes or suspicious lesions, normal color and turgor. Non tender slight varicose veins at lower legs; however, wears wrap around left lower leg.  NEUROLOGIC: Cranial Nerves: II-XII grossly intact. DTR's: Knees, Ankles 2+ and equal bilaterally. Gait & Posture: Normal gait and fine motion.     Protective Sensation (w/ 10 gram monofilament):  Right: Intact  Left: Intact    Visual Inspection:  Normal -  Bilateral    Pedal Pulses:   Right: Present  Left: Present    Posterior Tibialis Pulses:   Right:Present  Left: Present     ASSESSMENT:    ICD-10-CM ICD-9-CM    1. Annual physical exam  Z00.00 V70.0 Lipid Panel      Comprehensive Metabolic Panel      CBC Auto Differential      TSH      Urinalysis      Hemoglobin A1C      Ferritin      Vitamin D      2. Essential hypertension  I10 401.9       3. Prediabetes  R73.03 790.29       4. Vitamin D deficiency  E55.9 268.9       5. Obstructive sleep apnea of adult  G47.33 327.23       6. Arthritis of knee  M17.10 716.96       7. Yeast dermatitis  B37.2 112.3 nystatin (MYCOSTATIN) powder      8. Morbid obesity with BMI of 50.0-59.9, adult  E66.01 278.01     Z68.43 V85.43       9. Postmenopausal  Z78.0 V49.81           PLAN:  1. Age-appropriate counseling-appropriate low-sodium, low-cholesterol, low carbohydrate diet and exercise daily, monthly breast self exam, annual wellness examination.   2. Patient advised to call for  results.  3. Continue current medications.  4. Prescription refill as noted above and per patient request.  5. Keep follow up with specialists.  6. Flu shot this fall.   7. Follow up in about 6 months (around 1/14/2025) for hypertension and per-diabetes follow up.

## 2024-07-18 ENCOUNTER — TELEPHONE (OUTPATIENT)
Dept: SURGERY | Facility: CLINIC | Age: 54
End: 2024-07-18
Payer: COMMERCIAL

## 2024-07-25 ENCOUNTER — PROCEDURE VISIT (OUTPATIENT)
Dept: DERMATOLOGY | Facility: CLINIC | Age: 54
End: 2024-07-25
Payer: COMMERCIAL

## 2024-07-25 VITALS — HEIGHT: 65 IN | WEIGHT: 293 LBS | BODY MASS INDEX: 48.82 KG/M2

## 2024-07-25 DIAGNOSIS — L68.0 HIRSUTISM: Primary | ICD-10-CM

## 2024-07-26 ENCOUNTER — TELEPHONE (OUTPATIENT)
Dept: FAMILY MEDICINE | Facility: CLINIC | Age: 54
End: 2024-07-26
Payer: COMMERCIAL

## 2024-07-26 ENCOUNTER — PATIENT MESSAGE (OUTPATIENT)
Dept: FAMILY MEDICINE | Facility: CLINIC | Age: 54
End: 2024-07-26
Payer: COMMERCIAL

## 2024-07-30 ENCOUNTER — TELEPHONE (OUTPATIENT)
Dept: FAMILY MEDICINE | Facility: CLINIC | Age: 54
End: 2024-07-30
Payer: COMMERCIAL

## 2024-07-30 NOTE — TELEPHONE ENCOUNTER
Pt states she is having lab results faxed to the clinic. Pt requesting to discuss labs once they are received.

## 2024-08-08 ENCOUNTER — PATIENT MESSAGE (OUTPATIENT)
Dept: FAMILY MEDICINE | Facility: CLINIC | Age: 54
End: 2024-08-08
Payer: COMMERCIAL

## 2024-08-15 ENCOUNTER — TELEPHONE (OUTPATIENT)
Dept: DERMATOLOGY | Facility: CLINIC | Age: 54
End: 2024-08-15
Payer: COMMERCIAL

## 2024-08-15 NOTE — TELEPHONE ENCOUNTER
----- Message from Piotr Bobo sent at 8/15/2024 10:15 AM CDT -----  Contact: [phone]  Patient is scheduled for [facility] laser  on 8/22 and requesting to reschedule. Please call patient at [phone]. Thanks KB

## 2024-08-19 NOTE — ASSESSMENT & PLAN NOTE
We discussed her family history and how it could impact her own future risks.  We discussed family vs. genetic history and the importance and implications of each.  Genetic Counseling/Testing was offered, and all questions answered to her satisfaction.  She knows that as additional family members are diagnosed - she will need to let us know as this may change follow up and imaging recommendations.    We had a discussion concerning Breast Cancer Risk Reduction and current NCCN Guidelines. She knows that her risk can be lowered slightly with a healthy lifestyle and minimal ETOH use. Being physically active will also help. She should reduce or stay away from OCPs and HRT as possible.     We reviewed our findings today and her questions were answered.  She understands that her imaging and exams have remained stable (and show nothing concerning).  She is comfortable being followed in a conservative fashion.      She understands the importance of monthly self-breast examination and knows to report any and all changes as they occur.    NOTE:::We viewed her films together at today's visit.  We discussed the multiple views obtained and the important findings.  Even benign changes were mentioned and her questions were answered.  She is to contact us if she has questions.

## 2024-08-19 NOTE — PROGRESS NOTES
Date of Service: 9/9/2024    Chief Complaint:   Jenna Ovalle is a 54 y.o. female presenting today for her 6-month evaluation. She is due for an ultrasound.  She reports no interval changes.     History of Present Illness:   Mrs. Ovalle first presented on February 25, 2021 due to her concerns of her future breast cancer risk. She has a family history that is worrisome. Her imaging has been normal. MyRisk Negative but with VUS on BRCA2, AXIN2, and POLE (2020). Her BO was calculated in 2020 and found to give her a 23.1 % Lifetime Risk of Breast Cancer. MD::: Sultana Daily MD.       Past Medical History:   Diagnosis Date    Abnormal uterine bleeding Unknown    Please refer to [facility] records    Amenorrhea In past few years    Only due to perimenopause    Anemia 2010    Due to fibroids; excessive bleeding during cycles    Anxiety 2020    Grief    AR (allergic rhinitis)     BRCA negative 06/12/2023    Dysmenorrhea Unknown    Please refer to [facility] records    Family history of malignant neoplasm of breast 06/12/2023    Hypertension     Low vitamin D level 03/10/2017    Menorrhagia     Morbidly obese     PE (pulmonary embolism) 2008    elevated ESR, CRP in the past; On OCPs    Plantar fasciitis     Prediabetes     Sleep apnea, unspecified     Strabismus     Left eye    Uterine fibroid       Past Surgical History:   Procedure Laterality Date    CHOLECYSTECTOMY      HYSTEROSCOPY  2022    Please refer to [facility] records    HYSTEROSCOPY WITH DILATION AND CURETTAGE OF UTERUS  02/24/2022    LAPAROSCOPY      MYOMECTOMY  2004    laparotomy    SALPINGECTOMY Bilateral 03/23/2023    TOTAL ABDOMINAL HYSTERECTOMY  03/23/2023    Vein procedure Left 07/10/2024    Radio frequency ablation (Left Calf)        Current Outpatient Medications:     aspirin (ECOTRIN) 81 MG EC tablet, Take 81 mg by mouth once daily., Disp: , Rfl:     carvediloL (COREG) 3.125 MG tablet, Take 3.125 mg by mouth 2 (two) times daily., Disp: , Rfl:      cetirizine (ZYRTEC) 10 MG tablet, Take by mouth daily as needed., Disp: , Rfl:     eflornithine (VANIQA) 13.9 % cream, Apply topically 2 (two) times daily., Disp: , Rfl:     ergocalciferol (ERGOCALCIFEROL) 50,000 unit Cap, TAKE 1 CAPSULE BY MOUTH EVERY 7 DAYS, Disp: 12 capsule, Rfl: 3    metFORMIN (GLUCOPHAGE-XR) 500 MG ER 24hr tablet, TAKE 3 TABLETS BY MOUTH EVERY DAY, Disp: 90 tablet, Rfl: 5    nystatin (MYCOSTATIN) powder, Apply topically 2 (two) times daily. One bottle, Disp: 60 g, Rfl: 6    semaglutide (OZEMPIC) 2 mg/dose (8 mg/3 mL) PnIj, Inject 2 mg into the skin every 7 days., Disp: , Rfl:     tretinoin (RETIN-A) 0.025 % cream, Apply topically every evening., Disp: 45 g, Rfl: 2   Review of patient's allergies indicates:   Allergen Reactions    No known drug allergies       Social History     Tobacco Use    Smoking status: Never     Passive exposure: Never    Smokeless tobacco: Never   Substance Use Topics    Alcohol use: Not Currently      Family History   Problem Relation Name Age of Onset    Diabetes Mother May/     Cancer Mother May/         Lung cancer (nonsmoker)    Diabetes Father Zeke     Hypertension Father Zeke     Aneurysm Father Zeke         Brain aneurysm    Osteoarthritis Father Zeke         Knees    Thyroid disease Father Zeke     Other Sister          Prediabetes    Diabetes Brother Chouteau     Heart disease Paternal Grandmother      Cancer Paternal Grandfather ZEKE JOE SR         Pancreatic cancer    Breast cancer Maternal Aunt Anisha 36        X2    Cancer Maternal Aunt Anisha         Breast cancer    Kidney disease Maternal Aunt Anisha         dialysis    Osteoarthritis Maternal Aunt Anisha         Knees, back    Diabetes Maternal Aunt Anisha     Cancer Paternal Aunt Ita         Breast cancer    Breast cancer Paternal Aunt Ita 51    Osteoarthritis Paternal Aunt Ita         Knees, back    Ovarian cancer Paternal Aunt Saundra Olvera Joe 77     Stroke Paternal Uncle Dwayne/     Breast cancer Maternal Cousin  42    Breast cancer Paternal Cousin  48    Cancer Maternal Grandfather Jason/     Diabetes Maternal Grandfather Jason/     Diabetes Maternal Grandmother Heidi/     Diabetes Maternal Uncle Donavon         Review of Systems   Integumentary:  Negative for color change, rash, mole/lesion, thickening/swelling, dimpling of skin, drainage  Breast: Negative for mass and tenderness     Physical Exam   Constitutional: She appears well-developed. She is cooperative.   HENT: Normocephalic.   Cardiovascular:  Normal rate and regular rhythm.            Pulmonary/Chest: She exhibits no tenderness and no bony tenderness.   Abdominal: Soft. Normal appearance.   Musculoskeletal: Intact with no deficits  Neurological: She is alert.   Skin: No rash noted.   Breast and Chest Wall Evaluation:   Right breast exhibits no mass, no nipple discharge, no skin change and no tenderness.     Left breast exhibits no mass, no nipple discharge, no skin change and no tenderness.     Lymphadenopathy: No supraclavicular or axillary adenopathy.    ULTRASOUND EVALUATION and REPORT    Bilateral real-time Ultrasound was performed by me.  All four quadrants of the breast, the subareolar and axillary basins were scanned.    She has some heterogeneous dense fibroglandular tissue bilaterally.    Right Breast: She has normal tissues in the right breast; there's no disruption of the tissue planes and no abnormal shadowing; she has normal skin thickness with no subcutaneous nodules of skin thickening; NEM     Left Breast: She has normal tissues in the left breast; there's no disruption of the tissue planes and no abnormal shadowing; she has normal skin thickness with no subcutaneous nodules or skin thickening; NEM     Axillae: There are no abnormal lymph nodes seen bilaterally.     Impression: Some dense but normal tissue bilaterally with no solid/suspicious  masses noted. No LAD in bilateral axillae.  BIRADS: Category 2 - Benign Finding.    Findings were discussed with patient in real time and a hand written report was given to her at the conclusion of the exam.      ASSESSMENT and PLAN OF CARE     1. Family history of malignant neoplasm of breast  Assessment & Plan:  We discussed her family history and how it could impact her own future risks.  We discussed family vs. genetic history and the importance and implications of each.  Genetic Counseling/Testing was offered, and all questions answered to her satisfaction.  She knows that as additional family members are diagnosed - she will need to let us know as this may change follow up and imaging recommendations.    We had a discussion concerning Breast Cancer Risk Reduction and current NCCN Guidelines. She knows that her risk can be lowered slightly with a healthy lifestyle and minimal ETOH use. Being physically active will also help. She should reduce or stay away from OCPs and HRT as possible.     We reviewed our findings today and her questions were answered.  She understands that her imaging and exams have remained stable (and show nothing concerning).  She is comfortable being followed in a conservative fashion.      She understands the importance of monthly self-breast examination and knows to report any and all changes as they occur.    NOTE:::We viewed her films together at today's visit.  We discussed the multiple views obtained and the important findings.  Even benign changes were mentioned and her questions were answered.  She is to contact us if she has questions.        Medical Decision Making: It is my impression that the patient suffers from all the listed conditions.  Each of these conditions did affect my Plan of Care and all medical decisions that were rendered.  The medical decision making was of high difficulty based on her co-morbidities and her previous diagnosis of being a High-Risk Patient given  her personal and family histories.   I have performed and reviewed all imaging and it has been discussed with her. I have reviewed and verified her allergies, list of medications, medical and surgical histories, social history, and a pertinent review of symptoms.    Follow up: 6 months and prn     For:  Physical Examination and MGM (S) at [facility]

## 2024-09-04 ENCOUNTER — PATIENT MESSAGE (OUTPATIENT)
Dept: FAMILY MEDICINE | Facility: CLINIC | Age: 54
End: 2024-09-04
Payer: COMMERCIAL

## 2024-09-09 ENCOUNTER — OFFICE VISIT (OUTPATIENT)
Dept: SURGERY | Facility: CLINIC | Age: 54
End: 2024-09-09
Payer: COMMERCIAL

## 2024-09-09 DIAGNOSIS — Z80.3 FAMILY HISTORY OF MALIGNANT NEOPLASM OF BREAST: Primary | ICD-10-CM

## 2024-09-09 PROCEDURE — 1159F MED LIST DOCD IN RCRD: CPT | Mod: CPTII,S$GLB,, | Performed by: SPECIALIST

## 2024-09-09 PROCEDURE — 99214 OFFICE O/P EST MOD 30 MIN: CPT | Mod: S$GLB,,, | Performed by: SPECIALIST

## 2024-09-09 PROCEDURE — 99999 PR PBB SHADOW E&M-EST. PATIENT-LVL III: CPT | Mod: PBBFAC,,, | Performed by: SPECIALIST

## 2024-09-09 NOTE — PROGRESS NOTES
Jenna Ovalle  MRN:  [id number]  54 y.o. female      SUBJECTIVE:     CHIEF COMPLAINT:  - Pre-op clearance      Procedure/Surgery: Robotic gastric sleeve  Surgeon/MD:  Dr. Philippe De La Torre  DOS:   10/1/2024  Anesthesia:  [x]  General     []  Regional     []  MAC     []  Local  H/O Anesthesia Rxn: [x]  NO     []  YES  Pre-op Orders:  []  None  []  EKG     []  CXR     []  Blood work     []  Urine     []  MRSA/MSSA swab     [x]  Other: No orders available  Plans to have labs done 9/16/24 at [facility]      REVIEW OF SYSTEMS:  Review of Systems   Constitutional:  Negative for activity change, appetite change, chills, diaphoresis, fatigue and fever.   HENT: Negative.     Eyes:  Negative for photophobia, pain and visual disturbance.   Respiratory:  Negative for cough, chest tightness, shortness of breath and wheezing.    Cardiovascular:  Negative for chest pain, palpitations and leg swelling.   Gastrointestinal:  Negative for abdominal distention, abdominal pain, anal bleeding, blood in stool, constipation, diarrhea, nausea, rectal pain and vomiting.   Endocrine: Negative for polydipsia, polyphagia and polyuria.   Genitourinary: Negative.    Musculoskeletal: Negative.    Skin:  Negative for rash and wound.   Neurological: Negative.  Negative for dizziness, tremors, seizures, syncope, facial asymmetry, speech difficulty, weakness, light-headedness, numbness and headaches.   Hematological:  Negative for adenopathy. Does not bruise/bleed easily.   Psychiatric/Behavioral: Negative.           ALLERGIES:  Review of patient's allergies indicates:   Allergen Reactions    No known drug allergies        CURRENT PROBLEM LIST:  Patient Active Problem List   Diagnosis    Essential hypertension    AR (allergic rhinitis)    History of pulmonary embolism    Morbid obesity with BMI of 50.0-59.9, adult    Arthritis of back    Prediabetes    Arthritis of knee    Vitamin D deficiency    Exotropia of left eye    Thoracic radiculitis     Thoracic back pain    Uterine leiomyoma    Postmenopausal bleeding    Adjustment disorder with mixed anxiety and depressed mood    Postmenopausal    Family history of malignant neoplasm of breast    Obstructive sleep apnea of adult       CURRENT MEDICATIONS:  Current Outpatient Medications   Medication Instructions    aspirin (ECOTRIN) 81 mg, Daily    carvediloL (COREG) 3.125 mg, Oral, 2 times daily    cetirizine (ZYRTEC) 10 MG tablet Oral, Daily PRN    eflornithine (VANIQA) 13.9 % cream Topical (Top), 2 times daily    ergocalciferol (ERGOCALCIFEROL) 50,000 Units, Oral, Every 7 days    metFORMIN (GLUCOPHAGE-XR) 500 MG ER 24hr tablet TAKE 3 TABLETS BY MOUTH EVERY DAY    nystatin (MYCOSTATIN) powder Topical (Top), 2 times daily, One bottle    OZEMPIC 2 mg, Subcutaneous, Every 7 days    tretinoin (RETIN-A) 0.025 % cream Topical (Top), Nightly         HISTORY:    PAST MEDICAL HISTORY:  Past Medical History:   Diagnosis Date    Abnormal uterine bleeding Unknown    Please refer to [facility] records    Amenorrhea In past few years    Only due to perimenopause    Anemia 2010    Due to fibroids; excessive bleeding during cycles    Anxiety 2020    Grief    AR (allergic rhinitis)     BRCA negative 06/12/2023    Dysmenorrhea Unknown    Please refer to [facility] records    Family history of malignant neoplasm of breast 06/12/2023    Hypertension     Low vitamin D level 03/10/2017    Menorrhagia     Morbidly obese     PE (pulmonary embolism) 2008    elevated ESR, CRP in the past; On OCPs    Plantar fasciitis     Prediabetes     Sleep apnea, unspecified     Strabismus     Left eye    Uterine fibroid        PAST SURGICAL HISTORY:  Past Surgical History:   Procedure Laterality Date    CHOLECYSTECTOMY      HYSTEROSCOPY  2022    Please refer to [facility] records    HYSTEROSCOPY WITH DILATION AND CURETTAGE OF UTERUS  02/24/2022    LAPAROSCOPY      MYOMECTOMY  2004    laparotomy    SALPINGECTOMY Bilateral 03/23/2023    TOTAL ABDOMINAL HYSTERECTOMY   "2023    Vein procedure Left 07/10/2024    Radio frequency ablation (Left Calf)       FAMILY HISTORY:  Family History   Problem Relation Name Age of Onset    Diabetes Mother May/     Cancer Mother May/         Lung cancer (nonsmoker)    Diabetes Father Zeke     Hypertension Father Zeke     Aneurysm Father Zeke         Brain aneurysm    Osteoarthritis Father Zeke         Knees    Thyroid disease Father Zeke     Other Sister          Prediabetes    Diabetes Brother Lenoir     Heart disease Paternal Grandmother      Cancer Paternal Grandfather ZEKE OVALLE SR         Pancreatic cancer    Breast cancer Maternal Aunt Anisha 36        X2    Cancer Maternal Aunt Anisha         Breast cancer    Kidney disease Maternal Aunt Anisha         dialysis    Osteoarthritis Maternal Aunt Anisha         Knees, back    Diabetes Maternal Aunt Anisha     Cancer Paternal Aunt Ita         Breast cancer    Breast cancer Paternal Aunt Ita 51    Osteoarthritis Paternal Aunt Ita         Knees, back    Ovarian cancer Paternal Aunt Saundra Ovalle 77    Stroke Paternal Uncle Dwayne/     Breast cancer Maternal Cousin  42    Breast cancer Paternal Cousin  48    Cancer Maternal Grandfather Jason/     Diabetes Maternal Grandfather Jason/     Diabetes Maternal Grandmother Heidi/     Diabetes Maternal Uncle Donavon        SOCIAL HISTORY:  Social History     Tobacco Use    Smoking status: Never     Passive exposure: Never    Smokeless tobacco: Never   Substance Use Topics    Alcohol use: Not Currently    Drug use: No         OBJECTIVE:     VITAL SIGNS:  Vitals:    09/10/24 1456   BP: 120/72   Pulse: 69   Resp: 18   Temp: 97 °F (36.1 °C)   TempSrc: Tympanic   SpO2: 98%   Weight: (!) 143.6 kg (316 lb 9.3 oz)   Height: 5' 5" (1.651 m)         PHYSICAL EXAM:  Physical Exam  Constitutional:       General: She is not in acute distress.     Appearance: She is " well-developed. She is not diaphoretic.   HENT:      Head: Normocephalic and atraumatic.      Right Ear: Tympanic membrane, ear canal and external ear normal. There is no impacted cerumen.      Left Ear: Tympanic membrane, ear canal and external ear normal. There is no impacted cerumen.      Nose: Nose normal. No nasal deformity, mucosal edema, congestion or rhinorrhea.      Mouth/Throat:      Mouth: Mucous membranes are moist. Mucous membranes are not dry and not cyanotic. No oral lesions.      Dentition: Normal dentition.      Pharynx: Oropharynx is clear. Uvula midline. No oropharyngeal exudate, posterior oropharyngeal erythema or uvula swelling.      Tonsils: No tonsillar abscesses.   Eyes:      General: Lids are normal. Lids are everted, no foreign bodies appreciated. No scleral icterus.        Right eye: No discharge.         Left eye: No discharge.      Conjunctiva/sclera: Conjunctivae normal.      Right eye: Right conjunctiva is not injected. No exudate.     Left eye: Left conjunctiva is not injected. No exudate.     Pupils: Pupils are equal, round, and reactive to light.   Neck:      Thyroid: No thyroid mass or thyromegaly.      Vascular: No carotid bruit or JVD.      Trachea: No tracheal deviation.   Cardiovascular:      Rate and Rhythm: Normal rate and regular rhythm.      Pulses: Normal pulses.      Heart sounds: Normal heart sounds. No murmur heard.     No friction rub. No gallop.   Pulmonary:      Effort: Pulmonary effort is normal. No respiratory distress.      Breath sounds: Normal breath sounds. No stridor. No wheezing.   Chest:      Chest wall: No tenderness.   Abdominal:      General: Bowel sounds are normal. There is no distension.      Palpations: Abdomen is soft. There is no mass.      Tenderness: There is no abdominal tenderness. There is no guarding or rebound.   Musculoskeletal:         General: No swelling, tenderness or deformity. Normal range of motion.      Cervical back: Normal range of  motion and neck supple. No edema or erythema. Normal range of motion.   Lymphadenopathy:      Cervical: No cervical adenopathy.   Skin:     General: Skin is warm and dry.      Capillary Refill: Capillary refill takes less than 2 seconds.      Coloration: Skin is not pale.      Findings: No bruising, erythema or rash.   Neurological:      Mental Status: She is alert and oriented to person, place, and time.      Sensory: No sensory deficit.      Motor: No weakness, tremor, atrophy or abnormal muscle tone.      Coordination: Coordination normal.      Gait: Gait normal.      Deep Tendon Reflexes: Reflexes are normal and symmetric.   Psychiatric:         Mood and Affect: Mood normal.         Speech: Speech normal.         Behavior: Behavior normal.         Thought Content: Thought content normal.         Cognition and Memory: Memory is not impaired.         Judgment: Judgment normal.         ASSESSMENT:     1. Preop examination  2. Morbid obesity with BMI of 50.0-59.9, adult    PLAN:     Surgical clearance pending.  Denies smoking or alcohol intake.  Cardiac clearance completed at [facility].  Medication instructions:  Hold ASA, ASA-containing products and oral NSAIDs x 7 days before surgery.  Hold metformin morning of surgery.  Hold Ozempic 1 week before surgery.  Ok to continue daily MVI and Vitamin-D per pt instructions from surgeon.  RTC as directed and/or prYAHIR Iglesias  [facility]       30 minutes of total time spent on the encounter, which includes face to face time and non-face to face time preparing to see the patient.  This includes obtaining and/or reviewing separately obtained history, performing a medically appropriate examination and/or evaluation, and counseling and educating the patient/family/caregiver.  Includes documenting clinical information in the electronic or other health record, independently interpreting results (not separately reported) and communicating  results to the patient/family/caregiver, with care coordination (not separately reported).  Medications, tests and/or procedures ordered as necessary along with referring and communicating with other health professionals (when not separately reported).

## 2024-09-10 ENCOUNTER — OFFICE VISIT (OUTPATIENT)
Dept: FAMILY MEDICINE | Facility: CLINIC | Age: 54
End: 2024-09-10
Payer: COMMERCIAL

## 2024-09-10 VITALS
HEART RATE: 69 BPM | TEMPERATURE: 97 F | WEIGHT: 293 LBS | BODY MASS INDEX: 48.82 KG/M2 | RESPIRATION RATE: 18 BRPM | HEIGHT: 65 IN | OXYGEN SATURATION: 98 % | DIASTOLIC BLOOD PRESSURE: 72 MMHG | SYSTOLIC BLOOD PRESSURE: 120 MMHG

## 2024-09-10 DIAGNOSIS — E66.01 MORBID OBESITY WITH BMI OF 50.0-59.9, ADULT: ICD-10-CM

## 2024-09-10 DIAGNOSIS — Z01.818 PREOP EXAMINATION: Primary | ICD-10-CM

## 2024-09-10 PROCEDURE — 99214 OFFICE O/P EST MOD 30 MIN: CPT | Mod: S$GLB,,, | Performed by: REGISTERED NURSE

## 2024-09-10 PROCEDURE — 3074F SYST BP LT 130 MM HG: CPT | Mod: CPTII,S$GLB,, | Performed by: REGISTERED NURSE

## 2024-09-10 PROCEDURE — 99999 PR PBB SHADOW E&M-EST. PATIENT-LVL III: CPT | Mod: PBBFAC,,, | Performed by: REGISTERED NURSE

## 2024-09-10 PROCEDURE — 3078F DIAST BP <80 MM HG: CPT | Mod: CPTII,S$GLB,, | Performed by: REGISTERED NURSE

## 2024-09-10 PROCEDURE — 3008F BODY MASS INDEX DOCD: CPT | Mod: CPTII,S$GLB,, | Performed by: REGISTERED NURSE

## 2024-09-19 ENCOUNTER — PATIENT MESSAGE (OUTPATIENT)
Dept: DERMATOLOGY | Facility: CLINIC | Age: 54
End: 2024-09-19
Payer: COMMERCIAL

## 2024-09-20 ENCOUNTER — TELEPHONE (OUTPATIENT)
Dept: FAMILY MEDICINE | Facility: CLINIC | Age: 54
End: 2024-09-20
Payer: COMMERCIAL

## 2024-09-20 ENCOUNTER — PATIENT MESSAGE (OUTPATIENT)
Dept: FAMILY MEDICINE | Facility: CLINIC | Age: 54
End: 2024-09-20
Payer: COMMERCIAL

## 2024-09-26 ENCOUNTER — PROCEDURE VISIT (OUTPATIENT)
Dept: DERMATOLOGY | Facility: CLINIC | Age: 54
End: 2024-09-26
Payer: COMMERCIAL

## 2024-09-26 DIAGNOSIS — L68.0 HIRSUTISM: Primary | ICD-10-CM

## 2024-09-26 NOTE — PATIENT INSTRUCTIONS
Laser Post-Procedure Care:    You may experience swelling for 1 day to 1 week after your procedure. You may also experience redness that can last for 1 to 2 weeks. Purpura/bruising may also occur.    You should avoid direct sun exposure for 14 days after laser treatment. For the first week after the procedure, wash the skin twice daily using a gentle cleanser (i.e. Vanicream facial cleanser, CeraVe hydrating cleanser, CeraVe foaming cleanser, Cetaphil gentle facial cleanser, or Purpose cleanser). Avoid harsh cleansers (i.e. Dial) and cleansers that contain ingredients that may irritate the skin (i.e. salicylic acid, benzoyl peroxide, glycolic acid, etc.). Avoid exfoliation and motorized spinning brushes.  Apply a gentle moisturizer twice daily. Apply a sunscreen with SPF 30+ (preferably a mineral based sunscreen, such as with zinc oxide) on top of your gel/moisturizer in the morning. You may resume all prescription topical medications 1 week after your procedure, or sooner if otherwise indicated by your doctor.    If you have any questions or concerns, call [phone] or send a message with or without photos via [facility] to your laser provider.

## 2024-10-23 ENCOUNTER — PATIENT MESSAGE (OUTPATIENT)
Dept: ADMINISTRATIVE | Facility: HOSPITAL | Age: 54
End: 2024-10-23
Payer: COMMERCIAL

## 2024-10-24 ENCOUNTER — PATIENT OUTREACH (OUTPATIENT)
Dept: ADMINISTRATIVE | Facility: HOSPITAL | Age: 54
End: 2024-10-24
Payer: COMMERCIAL

## 2024-10-24 ENCOUNTER — PROCEDURE VISIT (OUTPATIENT)
Dept: DERMATOLOGY | Facility: CLINIC | Age: 54
End: 2024-10-24
Payer: COMMERCIAL

## 2024-10-24 DIAGNOSIS — L68.0 HIRSUTISM: Primary | ICD-10-CM

## 2024-10-24 NOTE — PROGRESS NOTES
Replying to Campaign Questionnaire for Over[facility]:  Mammogram     MAURIZIO sent to [facility] for copy of [facility].  Reminder set x 2 weeks.

## 2024-10-24 NOTE — PATIENT INSTRUCTIONS
Laser Post-Procedure Care:    You may experience swelling for 1 day to 1 week after your procedure. You may also experience redness that can last for 1 to 2 weeks. Purpura/bruising may also occur.    You should avoid direct sun exposure for 14 days after laser treatment. For the first week after the procedure, wash the skin twice daily using a gentle cleanser (i.e. Vanicream facial cleanser, CeraVe hydrating cleanser, CeraVe foaming cleanser, Cetaphil gentle facial cleanser, or Purpose cleanser). Avoid harsh cleansers (i.e. Dial) and cleansers that contain ingredients that may irritate the skin (i.e. salicylic acid, benzoyl peroxide, glycolic acid, etc.). Avoid exfoliation and motorized spinning brushes.  Apply a gentle moisturizer twice daily. Apply a sunscreen with SPF 30+ (preferably a mineral based sunscreen, such as with zinc oxide) on top of your gel/moisturizer in the morning. You may resume all prescription topical medications 1 week after your procedure, or sooner if otherwise indicated by your doctor.    If you have any questions or concerns, call [phone] or send a message with or without photos via [facility] to your laser provider.

## 2024-10-24 NOTE — LETTER
AUTHORIZATION FOR RELEASE OF   CONFIDENTIAL INFORMATION    Dear [facility],    We are seeing Jenna Ovalle, date of birth 1970, in the clinic at [facility]. Franca Richmond MD is the patient's PCP. Jenna Ovalle has an outstanding lab/procedure at the time we reviewed her chart. In order to help keep her health information updated, she has authorized us to request the following medical record(s):              Please fax records to [facility], [place], Janet White, MD, Care Coordination     If you have any questions, please contact Digna at [phone].           Patient Name: Jenna Ovalle  : 1970  Patient Phone #: [phone]

## 2024-10-28 ENCOUNTER — TELEPHONE (OUTPATIENT)
Dept: PHARMACY | Facility: CLINIC | Age: 54
End: 2024-10-28
Payer: COMMERCIAL

## 2024-11-21 ENCOUNTER — OFFICE VISIT (OUTPATIENT)
Dept: OPHTHALMOLOGY | Facility: CLINIC | Age: 54
End: 2024-11-21
Payer: COMMERCIAL

## 2024-11-21 DIAGNOSIS — E11.9 TYPE 2 DIABETES MELLITUS WITHOUT RETINOPATHY: Primary | ICD-10-CM

## 2024-11-21 DIAGNOSIS — H50.332 INTERMITTENT EXOTROPIA OF LEFT EYE: ICD-10-CM

## 2024-11-21 DIAGNOSIS — H52.13 MYOPIA WITH ASTIGMATISM AND PRESBYOPIA, BILATERAL: ICD-10-CM

## 2024-11-21 DIAGNOSIS — H52.203 MYOPIA WITH ASTIGMATISM AND PRESBYOPIA, BILATERAL: ICD-10-CM

## 2024-11-21 DIAGNOSIS — H52.4 MYOPIA WITH ASTIGMATISM AND PRESBYOPIA, BILATERAL: ICD-10-CM

## 2024-11-21 PROCEDURE — 3044F HG A1C LEVEL LT 7.0%: CPT | Mod: CPTII,S$GLB,, | Performed by: OPTOMETRIST

## 2024-11-21 PROCEDURE — 2023F DILAT RTA XM W/O RTNOPTHY: CPT | Mod: CPTII,S$GLB,, | Performed by: OPTOMETRIST

## 2024-11-21 PROCEDURE — 1160F RVW MEDS BY RX/DR IN RCRD: CPT | Mod: CPTII,S$GLB,, | Performed by: OPTOMETRIST

## 2024-11-21 PROCEDURE — 99999 PR PBB SHADOW E&M-EST. PATIENT-LVL III: CPT | Mod: PBBFAC,,, | Performed by: OPTOMETRIST

## 2024-11-21 PROCEDURE — 1159F MED LIST DOCD IN RCRD: CPT | Mod: CPTII,S$GLB,, | Performed by: OPTOMETRIST

## 2024-11-21 PROCEDURE — 92015 DETERMINE REFRACTIVE STATE: CPT | Mod: S$GLB,,, | Performed by: OPTOMETRIST

## 2024-11-21 PROCEDURE — 92014 COMPRE OPH EXAM EST PT 1/>: CPT | Mod: S$GLB,,, | Performed by: OPTOMETRIST

## 2024-11-21 NOTE — PROGRESS NOTES
HPI     Diabetes            Comments:   Lab Results       Component                Value               Date                       HGBA1C                   5.4                 10/18/2024                Vision changes since last eye exam?: no      Any eye pain today: no    Other ocular symptoms: OS seems to pull when exhausted    Interested in contact lens fitting today? No    No gtts                               Comments    NP-Referred by Dr. Richmond  H/o Strabismus 2012  +DM 2016             Last edited by Marco A Barrera on 11/21/2024  2:50 PM.            Assessment /Plan     For exam results, see Encounter Report.    Type 2 diabetes mellitus without retinopathy  There was no diabetic retinopathy present in either eye today.   Recommended that pt continue care with PCP and/or specialists regarding diabetes.  Follow-up dilated eye exam recommended in 12 months, sooner with any vision changes or new concerns.    Intermittent exotropia of left eye  Myopia with astigmatism and presbyopia, bilateral  Eyeglass Final Rx       Eyeglass Final Rx         Sphere Cylinder Axis    Right -3.50 +1.25 140    Left -5.25 +2.25 050      Type: MXP4 distance    Expiration Date: 11/21/2025              Eyeglass Final Rx #2         Sphere Cylinder Axis    Right -2.25 +1.25 140    Left -4.00 +2.25 050      Type: MXP4 computer    Expiration Date: 11/21/2025                      RTC 1 yr for dilated eye exam or PRN if any problems.   Discussed above and answered questions.

## 2024-11-29 ENCOUNTER — PATIENT OUTREACH (OUTPATIENT)
Dept: ADMINISTRATIVE | Facility: HOSPITAL | Age: 54
End: 2024-11-29
Payer: COMMERCIAL

## 2024-11-29 NOTE — LETTER
AUTHORIZATION FOR RELEASE OF   CONFIDENTIAL INFORMATION    Dear [facility],    We are seeing Jenna Ovalle, date of birth 1970, in the clinic at [facility]. Franca Richmond MD is the patient's PCP. Jenna Ovalle has an outstanding lab/procedure at the time we reviewed her chart. In order to help keep her health information updated, she has authorized us to request the following medical record(s):        ( x )  MAMMOGRAM                                      (  )  COLONOSCOPY      (  )  PAP SMEAR                                          (  )  OUTSIDE LAB RESULTS     (  )  DEXA SCAN                                          (  )  EYE EXAM            (  )  FOOT EXAM                                          (  )  ENTIRE RECORD     (  )  OUTSIDE IMMUNIZATIONS                 (  )  _______________         Please fax records to [facility], [facility] Care Coordination @ [phone].   If you have any questions, please contact [facility]  [phone]            Patient Name: Jenna Ovalle  : 1970  Patient Phone #: [phone]

## 2024-11-29 NOTE — PROGRESS NOTES
Replying to Campaign Questionnaire for Overdue HM:  MAMMOGRAM     MAURIZIO to [facility]. Reminder set x 2 weeks.

## 2024-12-05 ENCOUNTER — PATIENT MESSAGE (OUTPATIENT)
Dept: FAMILY MEDICINE | Facility: CLINIC | Age: 54
End: 2024-12-05
Payer: COMMERCIAL

## 2024-12-05 ENCOUNTER — OFFICE VISIT (OUTPATIENT)
Dept: OTOLARYNGOLOGY | Facility: CLINIC | Age: 54
End: 2024-12-05
Payer: COMMERCIAL

## 2024-12-05 VITALS — HEIGHT: 66 IN | WEIGHT: 282.19 LBS | BODY MASS INDEX: 45.35 KG/M2

## 2024-12-05 DIAGNOSIS — M26.622 TMJ TENDERNESS, LEFT: ICD-10-CM

## 2024-12-05 DIAGNOSIS — H92.02 ACUTE OTALGIA, LEFT: Primary | ICD-10-CM

## 2024-12-05 PROCEDURE — 3044F HG A1C LEVEL LT 7.0%: CPT | Mod: CPTII,S$GLB,, | Performed by: ORTHOPAEDIC SURGERY

## 2024-12-05 PROCEDURE — 3008F BODY MASS INDEX DOCD: CPT | Mod: CPTII,S$GLB,, | Performed by: ORTHOPAEDIC SURGERY

## 2024-12-05 PROCEDURE — 1159F MED LIST DOCD IN RCRD: CPT | Mod: CPTII,S$GLB,, | Performed by: ORTHOPAEDIC SURGERY

## 2024-12-05 PROCEDURE — 99203 OFFICE O/P NEW LOW 30 MIN: CPT | Mod: S$GLB,,, | Performed by: ORTHOPAEDIC SURGERY

## 2024-12-05 PROCEDURE — 99999 PR PBB SHADOW E&M-EST. PATIENT-LVL III: CPT | Mod: PBBFAC,,, | Performed by: ORTHOPAEDIC SURGERY

## 2024-12-05 NOTE — PROGRESS NOTES
Subjective:      Patient ID: Jenna Ovalle is a 54 y.o. female.    Chief Complaint: Ear Problem (Pt is coming in for intermittent L ear throbbing pt states when she push on the L ear she feels pain)    Patient is a 54 year old female seen today for evaluation of a throbbing sensation in her left ear. This has been an issue for the last few months.  No hearing loss or tinnitus in either ear.  She has some relief with manipulation of the tragus.  She had a diagnosis of TMJ about a year ago, now resolved, does not wear mouth guard at night.  She has not noted these issues at night/while sleeping, worse in the afternoon/evening.  May occur 4-5 times in one day, sometimes does not occur at all during the day.  She does note that she has been chewing more following bariatric surgery several months.          Review of Systems   Constitutional: Negative.    HENT:  Positive for ear pain.    Eyes: Negative.    Cardiovascular: Negative.    Endocrine: Negative.    Genitourinary: Negative.    Musculoskeletal:  Positive for back pain.   Skin: Negative.    Allergic/Immunologic: Negative.    Neurological:  Positive for light-headedness.   Hematological:  Bruises/bleeds easily.   Psychiatric/Behavioral:  Positive for sleep disturbance.        Objective:       Physical Exam  Constitutional:       General: She is not in acute distress.     Appearance: She is well-developed.   HENT:      Head: Normocephalic and atraumatic.      Right Ear: Tympanic membrane, ear canal and external ear normal.      Left Ear: Tympanic membrane, ear canal and external ear normal.      Ears:      Comments: Pain anterior to left ear     Nose: Nose normal. No nasal deformity, septal deviation, mucosal edema or rhinorrhea.      Right Sinus: No maxillary sinus tenderness or frontal sinus tenderness.      Left Sinus: No maxillary sinus tenderness or frontal sinus tenderness.      Mouth/Throat:      Mouth: Mucous membranes are not pale and not dry.       Dentition: No dental caries.      Pharynx: Uvula midline. No oropharyngeal exudate or posterior oropharyngeal erythema.   Eyes:      General: Lids are normal. No scleral icterus.     Extraocular Movements:      Right eye: Normal extraocular motion and no nystagmus.      Left eye: Normal extraocular motion and no nystagmus.      Conjunctiva/sclera: Conjunctivae normal.      Right eye: Right conjunctiva is not injected. No chemosis.     Left eye: Left conjunctiva is not injected. No chemosis.     Pupils: Pupils are equal, round, and reactive to light.   Neck:      Thyroid: No thyroid mass or thyromegaly.      Trachea: Trachea and phonation normal. No tracheal tenderness or tracheal deviation.   Pulmonary:      Effort: Pulmonary effort is normal. No respiratory distress.      Breath sounds: No stridor.   Abdominal:      General: There is no distension.   Lymphadenopathy:      Head:      Right side of head: No submental, submandibular, preauricular, posterior auricular or occipital adenopathy.      Left side of head: No submental, submandibular, preauricular, posterior auricular or occipital adenopathy.      Cervical: No cervical adenopathy.   Skin:     General: Skin is warm and dry.      Findings: No erythema or rash.   Neurological:      Mental Status: She is alert and oriented to person, place, and time.      Cranial Nerves: No cranial nerve deficit.      Gait: Gait normal.   Psychiatric:         Behavior: Behavior normal.         Assessment:       1. Acute otalgia, left    2. TMJ tenderness, left        Plan:     Acute otalgia, left    TMJ tenderness, left    Patient does have a history of TMJ.  Reassured her that her ear exam today is normal.  She does note that she has been chewing her food much more following her recent bariatric surgery, which may have exacerbated her TMJ symptoms.  I would recommend conservative measures to treat TMJ including soft diet, warm compresses, avoid gum chewing.  She is unable to  take anti-inflammatories following her bariatric surgery.  If her symptoms persist, follow with dentist for further guidance.

## 2024-12-20 ENCOUNTER — PATIENT MESSAGE (OUTPATIENT)
Dept: ADMINISTRATIVE | Facility: HOSPITAL | Age: 54
End: 2024-12-20
Payer: COMMERCIAL

## 2024-12-27 ENCOUNTER — PATIENT MESSAGE (OUTPATIENT)
Dept: OPTOMETRY | Facility: CLINIC | Age: 54
End: 2024-12-27
Payer: COMMERCIAL

## 2024-12-30 ENCOUNTER — TELEPHONE (OUTPATIENT)
Dept: DERMATOLOGY | Facility: CLINIC | Age: 54
End: 2024-12-30
Payer: COMMERCIAL

## 2024-12-30 NOTE — TELEPHONE ENCOUNTER
Attempted to call pt to see if patient would like to get scheduled for the January laser clinic. No answer. Message left to return call.

## 2025-01-07 ENCOUNTER — TELEPHONE (OUTPATIENT)
Dept: SURGERY | Facility: CLINIC | Age: 55
End: 2025-01-07
Payer: COMMERCIAL

## 2025-01-07 DIAGNOSIS — N63.11 MASS OF UPPER OUTER QUADRANT OF RIGHT BREAST: Primary | ICD-10-CM

## 2025-01-07 NOTE — TELEPHONE ENCOUNTER
----- Message from Kamini sent at 1/6/2025  4:30 PM CST -----  Type:  Sooner Appointment Request    Caller is requesting a sooner appointment. Caller declined first available appointment listed below.  Caller will not accept being placed on the waitlist and is requesting a message be sent to doctor.  Name of Caller: Pt   When is the first available appointment? Private schedule   Symptoms: Check for nodule above right breast, more or less in the center   Would the patient rather a call back or a response via [facility]? Call back   Best Call Back Number: [phone]

## 2025-01-07 NOTE — TELEPHONE ENCOUNTER
Pt. Updated that I will see her after her imaging. She will report to [facility] for a [facility](D) and US(D) Rt. [facility] 1/8/2025 @ 0830 then see me after. She understands and agrees with this plan.

## 2025-01-07 NOTE — PROGRESS NOTES
[facility]  MD Sagar Mcclendon, NP-C    Date of Service: 1/8/2025      Chief Complaint:   Jenna Ovalle is a 54 y.o. female presenting today for a new right breast lump that she first noticed 4 weeks ago.     History of Present Illness:   Mrs. Ovalle first presented on February 25, 2021 due to her concerns of her future breast cancer risk. She has a family history that is worrisome. Her imaging has been normal. MyRisk Negative but with VUS on BRCA2, AXIN2, and POLE (2020). Her BO was calculated in 2020 and found to give her a 23.1 % Lifetime Risk of Breast Cancer. MD::: Sultana Daily MD.     Past Medical History:   Diagnosis Date    Abnormal uterine bleeding Unknown    Please refer to [facility] records    Amenorrhea In past few years    Only due to perimenopause    Anemia 2010    Due to fibroids; excessive bleeding during cycles    Anxiety 2020    Grief    AR (allergic rhinitis)     BRCA negative 06/12/2023    Dysmenorrhea Unknown    Please refer to [facility] records    Family history of malignant neoplasm of breast 06/12/2023    Hypertension     Low vitamin D level 03/10/2017    Mass of upper outer quadrant of right breast 01/07/2025    Mastodynia of right breast 01/08/2025    Menorrhagia     Morbidly obese     PE (pulmonary embolism) 2008    elevated ESR, CRP in the past; On OCPs    Plantar fasciitis     Prediabetes     Sleep apnea, unspecified     Strabismus     Left eye    Uterine fibroid       Past Surgical History:   Procedure Laterality Date    CHOLECYSTECTOMY      Gastric sleeve 10/1/2024      HYSTEROSCOPY  2022    Please refer to [facility] records    HYSTEROSCOPY WITH DILATION AND CURETTAGE OF UTERUS  02/24/2022    LAPAROSCOPY      MYOMECTOMY  2004    laparotomy    SALPINGECTOMY Bilateral 03/23/2023    TOTAL ABDOMINAL HYSTERECTOMY  03/23/2023    Vein procedure Left 07/10/2024    Radio frequency ablation (Left Calf)        Current Outpatient Medications:      aspirin (ECOTRIN) 81 MG EC tablet, Take 81 mg by mouth once daily., Disp: , Rfl:     cetirizine (ZYRTEC) 10 MG tablet, Take by mouth daily as needed., Disp: , Rfl:     cyanocobalamin, vitamin B-12, (VITAMIN B-12 ORAL), , Disp: , Rfl:     ergocalciferol (ERGOCALCIFEROL) 50,000 unit Cap, TAKE 1 CAPSULE BY MOUTH EVERY 7 DAYS, Disp: 12 capsule, Rfl: 3    MULTIVITAMIN ORAL, , Disp: , Rfl:     mv-min/iron/folic/calcium/vitK (WOMEN'S MULTIVITAMIN ORAL), Take 1 tablet by mouth., Disp: , Rfl:     nystatin (MYCOSTATIN) powder, Apply topically 2 (two) times daily. One bottle, Disp: 60 g, Rfl: 6    pantoprazole sodium (PANTOPRAZOLE ORAL), 40 mg., Disp: , Rfl:     thiamine (VITAMIN B-1) 50 MG tablet, , Disp: , Rfl:     vit A/vit C/vit E/zinc/copper (VITAMINS A,C,E-ZINC-COPPER ORAL), , Disp: , Rfl:    Review of patient's allergies indicates:   Allergen Reactions    No known drug allergies       Social History     Tobacco Use    Smoking status: Never     Passive exposure: Never    Smokeless tobacco: Never   Substance Use Topics    Alcohol use: Not Currently      Family History   Problem Relation Name Age of Onset    Diabetes Mother May/     Cancer Mother May/         Lung cancer (nonsmoker)    Diabetes Father Zeke     Hypertension Father Zeke     Aneurysm Father Zeke         Brain aneurysm    Osteoarthritis Father Zeke         Knees    Thyroid disease Father Zeke     Arthritis Father Zeke     Other Sister          Prediabetes    Diabetes Brother Holt     Heart disease Paternal Grandmother Liv     Cancer Paternal Grandfather ZEEK RODRIGUEZTE SR         Pancreatic cancer    Breast cancer Maternal Aunt Anisha 36        X2    Cancer Maternal Aunt Anisha         Breast cancer    Kidney disease Maternal Aunt Anisha         dialysis    Osteoarthritis Maternal Aunt Anisha         Knees, back    Diabetes Maternal Aunt Anisha     Cancer Paternal Aunt Ita         Breast cancer    Breast cancer  Paternal Aunt Ita 51    Osteoarthritis Paternal Aunt Ita         Knees, back    Ovarian cancer Paternal Aunt Saundra Ovalle 77    Stroke Paternal Uncle Dwayne/     Breast cancer Maternal Cousin  42    Breast cancer Paternal Cousin  48    Cancer Maternal Grandfather Jason/     Diabetes Maternal Grandfather Jason/     Diabetes Maternal Grandmother Heidi/     Diabetes Maternal Uncle Donavon         Review of Systems   Integumentary:  Positive for breast mass. Negative for color change, rash, mole/lesion, breast discharge and breast tenderness.   Breast: Positive for mass.Negative for tenderness       Physical Exam   HENT:   Head: Normocephalic.   Pulmonary/Chest: Right breast exhibits no inverted nipple, no mass, no nipple discharge, no skin change and no tenderness. Left breast exhibits no inverted nipple, no mass, no nipple discharge, no skin change and no tenderness. No breast swelling.   Genitourinary: No breast swelling.   Musculoskeletal: Lymphadenopathy:      Upper Body:      Right upper body: No supraclavicular or axillary adenopathy.      Left upper body: No supraclavicular or axillary adenopathy.     Neurological: She is alert.          Mammogram: There are scattered areas of fibroglandular density. There is no evidence of suspicious masses, calcifications, or other abnormal findings.       Ultrasound: Right- There is no evidence of suspicious masses or other abnormal findings.      Assessment/Plan  1. Mass of upper outer quadrant of right breast  Assessment & Plan:  Her imaging and exams have remained normal. She's comfortable being followed conservatively. She understands the importance of monthly self-breast exams and knows to notify me of any and all changes as they occur.       2. Mastodynia of right breast  Assessment & Plan:  We discussed our fibrocystic mastopathy protocol in detail. She knows that if she follows this protocol - that her symptoms  should improve.  We discussed how breast pain is usually not associated with breast cancer, however, pain can be the presenting symptom with some cancers (but this could be coincidental). Still, if her pain does not improve in 8-12 weeks she should call us back for additional recommendations.        3. Family history of malignant neoplasm of breast  Assessment & Plan:  We discussed her family history and how it could impact her own future risks.  We discussed family vs. genetic history and the importance and implications of each.  Genetic Counseling/Testing was offered, and all questions answered to her satisfaction.  She knows that as additional family members are diagnosed - she will need to let us know as this may change follow up and imaging recommendations.    We had a discussion concerning Breast Cancer Risk Reduction and current NCCN Guidelines. She knows that her risk can be lowered slightly with a healthy lifestyle and minimal ETOH use. Being physically active will also help. She should reduce or stay away from OCPs and HRT as possible.     We reviewed our findings today and her questions were answered.  She understands that her imaging and exams have remained stable (and show nothing concerning).  She is comfortable being followed in a conservative fashion.      She understands the importance of monthly self-breast examination and knows to report any and all changes as they occur.    NOTE:::We viewed her films together at today's visit.  We discussed the multiple views obtained and the important findings.  Even benign changes were mentioned and her questions were answered.  She is to contact us if she has questions.  We discussed her family history and how it could impact her own future risks.  We discussed family vs. genetic history and the importance and implications of each.   All questions answered to her satisfaction.  She knows that as additional family members are diagnosed - she will need to let us  know as this may change follow up and imaging recommendations.    We had a discussion concerning Breast Cancer Risk Reduction and current NCCN Guidelines. She knows that her risk can be lowered slightly with a healthy lifestyle and minimal ETOH use. Being physically active will also help. She should reduce or stay away from OCPs and HRT as possible.                Medical Decision Making:  It is my impression that this patient suffers all conditions contained in this medical document.  Each of these conditions did affect our plan of care and my medical decision making today.  It is my opinion that the medical decision making concerning this patient was of moderate difficulty based on the aforementioned conditions.  Any further recommendations will be communicated to the patient by me.  I have reviewed and verified her allergies, list of medications, medical and surgical histories, social history, and a pertinent review of symptoms.      Follow up:  6 months and prn    For:  Physical Examination and MRI vs Ultrasound

## 2025-01-07 NOTE — ASSESSMENT & PLAN NOTE
Her imaging and exams have remained normal. She's comfortable being followed conservatively. She understands the importance of monthly self-breast exams and knows to notify me of any and all changes as they occur.

## 2025-01-07 NOTE — TELEPHONE ENCOUNTER
Spoke with pt she stated she has a lump on her right breast that is about the size of a pea. Pt stated her pain level is about a 5. Spoke with NP Lukas she advised me to put her on the schedule for 8am on 1/8/2025

## 2025-01-08 ENCOUNTER — OFFICE VISIT (OUTPATIENT)
Dept: SURGERY | Facility: CLINIC | Age: 55
End: 2025-01-08
Payer: COMMERCIAL

## 2025-01-08 DIAGNOSIS — N63.11 MASS OF UPPER OUTER QUADRANT OF RIGHT BREAST: Primary | ICD-10-CM

## 2025-01-08 DIAGNOSIS — N64.4 MASTODYNIA OF RIGHT BREAST: ICD-10-CM

## 2025-01-08 DIAGNOSIS — Z80.3 FAMILY HISTORY OF MALIGNANT NEOPLASM OF BREAST: ICD-10-CM

## 2025-01-08 PROCEDURE — 1159F MED LIST DOCD IN RCRD: CPT | Mod: CPTII,S$GLB,, | Performed by: NURSE PRACTITIONER

## 2025-01-08 PROCEDURE — 3072F LOW RISK FOR RETINOPATHY: CPT | Mod: CPTII,S$GLB,, | Performed by: NURSE PRACTITIONER

## 2025-01-08 PROCEDURE — 99999 PR PBB SHADOW E&M-EST. PATIENT-LVL III: CPT | Mod: PBBFAC,,, | Performed by: NURSE PRACTITIONER

## 2025-01-08 PROCEDURE — 99213 OFFICE O/P EST LOW 20 MIN: CPT | Mod: S$GLB,,, | Performed by: NURSE PRACTITIONER

## 2025-01-08 PROCEDURE — 1160F RVW MEDS BY RX/DR IN RCRD: CPT | Mod: CPTII,S$GLB,, | Performed by: NURSE PRACTITIONER

## 2025-01-08 RX ORDER — THIAMINE HCL 50 MG
TABLET ORAL
COMMUNITY
Start: 2024-10-02

## 2025-01-14 ENCOUNTER — LAB VISIT (OUTPATIENT)
Dept: LAB | Facility: HOSPITAL | Age: 55
End: 2025-01-14
Attending: FAMILY MEDICINE
Payer: COMMERCIAL

## 2025-01-14 ENCOUNTER — OFFICE VISIT (OUTPATIENT)
Dept: FAMILY MEDICINE | Facility: CLINIC | Age: 55
End: 2025-01-14
Attending: FAMILY MEDICINE
Payer: COMMERCIAL

## 2025-01-14 VITALS
HEART RATE: 65 BPM | DIASTOLIC BLOOD PRESSURE: 72 MMHG | SYSTOLIC BLOOD PRESSURE: 122 MMHG | TEMPERATURE: 97 F | RESPIRATION RATE: 18 BRPM | BODY MASS INDEX: 43.96 KG/M2 | HEIGHT: 66 IN | WEIGHT: 273.56 LBS | OXYGEN SATURATION: 96 %

## 2025-01-14 DIAGNOSIS — Z23 NEED FOR VACCINATION: ICD-10-CM

## 2025-01-14 DIAGNOSIS — R73.03 PREDIABETES: ICD-10-CM

## 2025-01-14 DIAGNOSIS — I10 ESSENTIAL HYPERTENSION: ICD-10-CM

## 2025-01-14 DIAGNOSIS — I10 ESSENTIAL HYPERTENSION: Primary | ICD-10-CM

## 2025-01-14 DIAGNOSIS — E66.01 MORBID OBESITY WITH BMI OF 40.0-44.9, ADULT: ICD-10-CM

## 2025-01-14 LAB
ALBUMIN SERPL BCP-MCNC: 3.1 G/DL (ref 3.5–5.2)
ALP SERPL-CCNC: 86 U/L (ref 40–150)
ALT SERPL W/O P-5'-P-CCNC: 20 U/L (ref 10–44)
ANION GAP SERPL CALC-SCNC: 10 MMOL/L (ref 8–16)
AST SERPL-CCNC: 18 U/L (ref 10–40)
BILIRUB SERPL-MCNC: 0.2 MG/DL (ref 0.1–1)
BUN SERPL-MCNC: 8 MG/DL (ref 6–20)
CALCIUM SERPL-MCNC: 9.7 MG/DL (ref 8.7–10.5)
CHLORIDE SERPL-SCNC: 105 MMOL/L (ref 95–110)
CO2 SERPL-SCNC: 25 MMOL/L (ref 23–29)
CREAT SERPL-MCNC: 0.7 MG/DL (ref 0.5–1.4)
EST. GFR  (NO RACE VARIABLE): >60 ML/MIN/1.73 M^2
GLUCOSE SERPL-MCNC: 93 MG/DL (ref 70–110)
POTASSIUM SERPL-SCNC: 3.5 MMOL/L (ref 3.5–5.1)
PROT SERPL-MCNC: 7.8 G/DL (ref 6–8.4)
SODIUM SERPL-SCNC: 140 MMOL/L (ref 136–145)

## 2025-01-14 PROCEDURE — 99214 OFFICE O/P EST MOD 30 MIN: CPT | Mod: 25,S$GLB,, | Performed by: FAMILY MEDICINE

## 2025-01-14 PROCEDURE — 36415 COLL VENOUS BLD VENIPUNCTURE: CPT | Mod: PO | Performed by: FAMILY MEDICINE

## 2025-01-14 PROCEDURE — 80053 COMPREHEN METABOLIC PANEL: CPT | Performed by: FAMILY MEDICINE

## 2025-01-14 PROCEDURE — 90656 IIV3 VACC NO PRSV 0.5 ML IM: CPT | Mod: S$GLB,,, | Performed by: FAMILY MEDICINE

## 2025-01-14 PROCEDURE — 90471 IMMUNIZATION ADMIN: CPT | Mod: S$GLB,,, | Performed by: FAMILY MEDICINE

## 2025-01-14 PROCEDURE — 3072F LOW RISK FOR RETINOPATHY: CPT | Mod: CPTII,S$GLB,, | Performed by: FAMILY MEDICINE

## 2025-01-14 PROCEDURE — G2211 COMPLEX E/M VISIT ADD ON: HCPCS | Mod: S$GLB,,, | Performed by: FAMILY MEDICINE

## 2025-01-14 PROCEDURE — 3008F BODY MASS INDEX DOCD: CPT | Mod: CPTII,S$GLB,, | Performed by: FAMILY MEDICINE

## 2025-01-14 PROCEDURE — 99999 PR PBB SHADOW E&M-EST. PATIENT-LVL V: CPT | Mod: PBBFAC,,, | Performed by: FAMILY MEDICINE

## 2025-01-14 PROCEDURE — 3078F DIAST BP <80 MM HG: CPT | Mod: CPTII,S$GLB,, | Performed by: FAMILY MEDICINE

## 2025-01-14 PROCEDURE — 3074F SYST BP LT 130 MM HG: CPT | Mod: CPTII,S$GLB,, | Performed by: FAMILY MEDICINE

## 2025-01-14 RX ORDER — TEMAZEPAM 15 MG/1
15 CAPSULE ORAL
COMMUNITY
Start: 2024-09-24

## 2025-01-14 RX ORDER — ONDANSETRON 4 MG/1
4 TABLET, ORALLY DISINTEGRATING ORAL EVERY 8 HOURS PRN
COMMUNITY
Start: 2024-09-24

## 2025-01-14 RX ORDER — CYANOCOBALAMIN (VITAMIN B-12) 1000 MCG
TABLET, EXTENDED RELEASE ORAL
COMMUNITY
Start: 2024-10-02

## 2025-01-14 RX ORDER — ACETAMINOPHEN 500 MG
TABLET ORAL
COMMUNITY
Start: 2024-10-02

## 2025-01-14 RX ORDER — POLYETHYLENE GLYCOL 3350 17 G/17G
17 POWDER, FOR SOLUTION ORAL
COMMUNITY
Start: 2024-09-24

## 2025-01-14 RX ORDER — TRAMADOL HYDROCHLORIDE 50 MG/1
50 TABLET ORAL EVERY 6 HOURS PRN
COMMUNITY
Start: 2024-09-24

## 2025-01-14 RX ORDER — ACETAMINOPHEN 500 MG
500 TABLET ORAL EVERY 6 HOURS PRN
COMMUNITY
Start: 2024-09-24

## 2025-01-14 RX ORDER — CALCIUM CARBONATE 500(1250)
TABLET,CHEWABLE ORAL
COMMUNITY
Start: 2024-10-02

## 2025-01-14 RX ORDER — CLARITHROMYCIN 500 MG/1
500 TABLET, FILM COATED ORAL 2 TIMES DAILY
COMMUNITY
Start: 2024-09-26

## 2025-01-14 NOTE — PROGRESS NOTES
Jenna Ovalle    Chief Complaint   Patient presents with    Hypertension    Pre-diabetes    Follow-up       History of Present Illness:   Ms. Ovalle comes in today for hypertension and prediabetes follow-up.  She states she is fasting and has not taken medication today.  She states she now controls hypertension and prediabetes with diet and exercise only since having bariatric (sleeve) surgery on October 1, 2024 with Dr. Philippe De La Torre.  She states she occasionally performs home blood pressure checks with levels ranging 128/70's.  She states she exercises 2-3 times per week, 30-40 minutes each time at the gym.  She states she monitors her diet.    She complains of having occasional pain under her left toes.  She states she was evaluated at urgent care at [facility] on January 9, 2025 and was told to wear shoe inserts which she states has helped.    She states she follows with Dr. Philippe De La Torre, bariatric surgeon, every 3 months following gastric sleeve surgery and saw him on November 7, 2024 for essential (primary) hypertension, post-operative state, prediabetes, vitamin D deficiency, unspecified, iron deficiency anemia secondary to blood loss (chronic), osteoarthritis of both knees, unspecified osteoarthritis type with follow-up scheduled for February 2025.    Otherwise, she denies having fever, chills, fatigue, appetite changes; shortness of breath, cough, wheezing; chest pain, palpitations, leg swelling; abdominal pain, nausea, vomiting, diarrhea, constipation; unusual urinary symptoms; polydipsia, polyphagia, polyuria, hot or cold intolerance; back pain; acute visual changes, numbness, headache; anxiety, depression, homicidal or suicidal thoughts.     She saw [facility] with Dr. Greg Nguyen, breast surgeon, on January 8, 2025 for mass of upper outer quadrant of right breast, mastodynia of right breast, family history of malignant neoplasm of breast with normal mammogram and U/S and told to  take vitamin E. She saw Dr. Marks with on December 30, 2024 for obesity, obstructive sleep apnea of adult. She saw NP Trixie Rey with [facility] on August 22, 2024 for primary hypertension, history of pulmonary embolus (PE), OMAR (obstructive sleep apnea).      Labs:                     WBC                      4.81                07/02/2021                 HGB                      11.8 (L)            10/01/2024                 HGB                      11.8 (L)            10/01/2024                 HCT                      35.4 (L)            10/01/2024                 HCT                      35.4 (L)            10/01/2024                 PLT                      322                 07/02/2021                 CHOL                     231 (H)             08/15/2023                 TRIG                     96                  08/15/2023                 HDL                      71                  08/15/2023                 LDLDIRECT                114.46              08/15/2023                 ALT                      22                  01/19/2023                 AST                      22                  01/19/2023                 NA                       141                 01/19/2023                 K                        4.2                 01/19/2023                 CL                       105                 11/30/2021                 CREATININE               0.6                 01/19/2023                 BUN                      11                  01/19/2023                 CO2                      29                  01/19/2023                 TSH                      1.73                10/18/2024                 INR                      1.06                09/16/2024                 HGBA1C                   5.4                 10/18/2024             LDLCALC                  109.8               07/02/2021                  Current Outpatient Medications   Medication Sig    acetaminophen  (TYLENOL) 500 MG tablet Take 500 mg by mouth every 6 (six) hours as needed.    aspirin (ECOTRIN) 81 MG EC tablet Take 81 mg by mouth once daily.    calcium carbonate (CALCIUM 500) 500 mg calcium (1,250 mg) chewable tablet     cetirizine (ZYRTEC) 10 MG tablet Take by mouth daily as needed.    cholecalciferol, vitamin D3, (VITAMIN D3) 125 mcg (5,000 unit) Tab     clarithromycin (BIAXIN) 500 MG tablet Take 500 mg by mouth 2 (two) times daily.    cyanocobalamin, vitamin B-12, (VITAMIN B-12 ORAL)     docusate sodium (COLACE CLEAR) 50 MG capsule     ergocalciferol (ERGOCALCIFEROL) 50,000 unit Cap TAKE 1 CAPSULE BY MOUTH EVERY 7 DAYS    iron, carbonyl 45 mg Tab     MULTIVITAMIN ORAL     nystatin (MYCOSTATIN) powder Apply topically 2 (two) times daily. One bottle    ondansetron (ZOFRAN-ODT) 4 MG TbDL Take 4 mg by mouth every 8 (eight) hours as needed.    pantoprazole sodium (PANTOPRAZOLE ORAL) 40 mg.    polyethylene glycol (GLYCOLAX) 17 gram PwPk Take 17 g by mouth.    temazepam (RESTORIL) 15 mg Cap Take 15 mg by mouth.    thiamine (VITAMIN B-1) 50 MG tablet     traMADoL (ULTRAM) 50 mg tablet Take 50 mg by mouth every 6 (six) hours as needed.    UNABLE TO FIND     vit A/vit C/vit E/zinc/copper (VITAMINS A,C,E-ZINC-COPPER ORAL)          Review of Systems   Constitutional:  Negative for activity change, appetite change, chills, fatigue, fever and unexpected weight change.        Weight 143.2 kg (315 lb 11.2 oz) at July 11, 2024 visit.   Eyes:  Negative for visual disturbance.   Respiratory:  Negative for cough, shortness of breath and wheezing.    Cardiovascular:  Negative for chest pain, palpitations and leg swelling.        See history of present illness.   Gastrointestinal:  Negative for abdominal pain, constipation, diarrhea, nausea and vomiting.   Endocrine: Negative for cold intolerance, heat intolerance, polydipsia, polyphagia and polyuria.        See history of present illness.   Genitourinary:  Negative for  difficulty urinating and menstrual problem.   Musculoskeletal:  Positive for myalgias. Negative for arthralgias, back pain and joint swelling.        See history of present illness.   Neurological:  Negative for numbness and headaches.   Hematological:         See history of present illness.   Psychiatric/Behavioral:  Negative for dysphoric mood and suicidal ideas. The patient is not nervous/anxious.         Negative for homicidal ideas.  See history of present illness.       Objective:  Physical Exam  Vitals reviewed.   Constitutional:       General: She is not in acute distress.     Appearance: Normal appearance. She is well-developed. She is obese. She is not ill-appearing, toxic-appearing or diaphoretic.      Comments: Obese and pleasant.   Neck:      Thyroid: No thyromegaly.   Cardiovascular:      Rate and Rhythm: Normal rate and regular rhythm.      Pulses:           Dorsalis pedis pulses are 3+ on the right side and 3+ on the left side.      Heart sounds: Normal heart sounds. No murmur heard.  Pulmonary:      Effort: Pulmonary effort is normal. No respiratory distress.      Breath sounds: Normal breath sounds. No wheezing.   Abdominal:      General: Bowel sounds are normal. There is no distension.      Palpations: Abdomen is soft. There is no mass.      Tenderness: There is no abdominal tenderness. There is no guarding or rebound.   Musculoskeletal:         General: Tenderness present. No swelling. Normal range of motion.      Cervical back: Normal range of motion and neck supple. No tenderness.      Comments: She is ambulatory without problems. Subtle tenderness at left metatarsal pad area.   Feet:      Right foot:      Protective Sensation: 5 sites tested.  5 sites sensed.      Skin integrity: No ulcer or skin breakdown.      Left foot:      Protective Sensation: 5 sites tested.  5 sites sensed.      Skin integrity: No ulcer or skin breakdown.   Lymphadenopathy:      Cervical: No cervical adenopathy.    Neurological:      General: No focal deficit present.      Mental Status: She is alert and oriented to person, place, and time.   Psychiatric:         Mood and Affect: Mood normal.         Behavior: Behavior normal.         Thought Content: Thought content normal.         Judgment: Judgment normal.         ASSESSMENT:  1. Essential hypertension    2. Prediabetes    3. Morbid obesity with BMI of 40.0-44.9, adult    4. Need for vaccination        PLAN:  Jenna was seen today for hypertension, pre-diabetes and follow-up.    Diagnoses and all orders for this visit:    Essential hypertension  -     Comprehensive Metabolic Panel; Future    Prediabetes  -     Comprehensive Metabolic Panel; Future    Morbid obesity with BMI of 40.0-44.9, adult    Need for vaccination  -     Influenza - Trivalent - PF (ADULT)    Patient advised to call for results.  Continue current medications, follow low sodium, low cholesterol, low carb diet, daily walks.  Keep follow up with specialists.  Follow up in about 6 months (around 7/18/2025) for physical.

## 2025-01-15 ENCOUNTER — PROCEDURE VISIT (OUTPATIENT)
Dept: DERMATOLOGY | Facility: CLINIC | Age: 55
End: 2025-01-15
Payer: COMMERCIAL

## 2025-01-15 DIAGNOSIS — L68.0 HIRSUTISM: Primary | ICD-10-CM

## 2025-01-15 PROCEDURE — 17999 UNLISTD PX SKN MUC MEMB SUBQ: CPT | Mod: CSM,,, | Performed by: DERMATOLOGY

## 2025-01-15 PROCEDURE — 99499 UNLISTED E&M SERVICE: CPT | Mod: CSM,,, | Performed by: DERMATOLOGY

## 2025-01-15 NOTE — PATIENT INSTRUCTIONS
Dermend Anti-bruise cream or Arnica gel    Laser Post-Procedure Care:    You may experience swelling for 1 day to 1 week after your procedure. You may also experience redness that can last for 1 to 2 weeks. Purpura/bruising may also occur.    You should avoid direct sun exposure for 14 days after laser treatment. For the first week after the procedure, wash the skin twice daily using a gentle cleanser (i.e. Vanicream facial cleanser, CeraVe hydrating cleanser, CeraVe foaming cleanser, Cetaphil gentle facial cleanser, or Purpose cleanser). Avoid harsh cleansers (i.e. Dial) and cleansers that contain ingredients that may irritate the skin (i.e. salicylic acid, benzoyl peroxide, glycolic acid, etc.). Avoid exfoliation and motorized spinning brushes.  Apply a gentle moisturizer twice daily. Apply a sunscreen with SPF 30+ (preferably a mineral based sunscreen, such as with zinc oxide) on top of your gel/moisturizer in the morning. You may resume all prescription topical medications 1 week after your procedure, or sooner if otherwise indicated by your doctor.    If you have any questions or concerns, call [phone] or send a message with or without photos via [facility] to your laser provider.

## 2025-01-31 ENCOUNTER — PATIENT MESSAGE (OUTPATIENT)
Dept: ADMINISTRATIVE | Facility: HOSPITAL | Age: 55
End: 2025-01-31
Payer: COMMERCIAL

## 2025-02-03 ENCOUNTER — PATIENT OUTREACH (OUTPATIENT)
Dept: ADMINISTRATIVE | Facility: HOSPITAL | Age: 55
End: 2025-02-03
Payer: COMMERCIAL

## 2025-02-03 NOTE — PROGRESS NOTES
Replying to Campaign Questionnaire for Overdue HM:  URINE SCREENING     Message sent to patient asking when to schedule microalbumin.

## 2025-02-04 ENCOUNTER — PATIENT OUTREACH (OUTPATIENT)
Dept: ADMINISTRATIVE | Facility: HOSPITAL | Age: 55
End: 2025-02-04
Payer: COMMERCIAL

## 2025-02-04 NOTE — PROGRESS NOTES
Replying to Campaign Questionnaire for Overdue HM: URINE SCREENING     Scheduled 02.05.2025 per patient's request at [facility]

## 2025-02-05 ENCOUNTER — PATIENT MESSAGE (OUTPATIENT)
Dept: SURGERY | Facility: CLINIC | Age: 55
End: 2025-02-05
Payer: COMMERCIAL

## 2025-02-05 ENCOUNTER — LAB VISIT (OUTPATIENT)
Dept: LAB | Facility: HOSPITAL | Age: 55
End: 2025-02-05
Attending: FAMILY MEDICINE
Payer: COMMERCIAL

## 2025-02-05 DIAGNOSIS — R73.03 PREDIABETES: ICD-10-CM

## 2025-02-05 LAB
ALBUMIN/CREAT UR: NORMAL UG/MG (ref 0–30)
CREAT UR-MCNC: 39 MG/DL (ref 15–325)
MICROALBUMIN UR DL<=1MG/L-MCNC: <5 UG/ML

## 2025-02-05 PROCEDURE — 82043 UR ALBUMIN QUANTITATIVE: CPT | Performed by: FAMILY MEDICINE

## 2025-02-18 ENCOUNTER — PATIENT MESSAGE (OUTPATIENT)
Dept: DERMATOLOGY | Facility: CLINIC | Age: 55
End: 2025-02-18
Payer: COMMERCIAL

## 2025-03-10 ENCOUNTER — PATIENT MESSAGE (OUTPATIENT)
Dept: FAMILY MEDICINE | Facility: CLINIC | Age: 55
End: 2025-03-10
Payer: COMMERCIAL

## 2025-03-11 ENCOUNTER — PATIENT MESSAGE (OUTPATIENT)
Dept: FAMILY MEDICINE | Facility: CLINIC | Age: 55
End: 2025-03-11
Payer: COMMERCIAL

## 2025-03-11 ENCOUNTER — TELEPHONE (OUTPATIENT)
Dept: FAMILY MEDICINE | Facility: CLINIC | Age: 55
End: 2025-03-11
Payer: COMMERCIAL

## 2025-03-11 NOTE — TELEPHONE ENCOUNTER
Pt faxed over [facility] paperwork to be renewed. Pt states you have completed this for her for years. Please advise if appt is needed. Paperwork at desk for completion.     LV: 1/14/25

## 2025-03-24 ENCOUNTER — PATIENT MESSAGE (OUTPATIENT)
Dept: FAMILY MEDICINE | Facility: CLINIC | Age: 55
End: 2025-03-24

## 2025-03-24 ENCOUNTER — OFFICE VISIT (OUTPATIENT)
Dept: FAMILY MEDICINE | Facility: CLINIC | Age: 55
End: 2025-03-24
Payer: COMMERCIAL

## 2025-03-24 VITALS
WEIGHT: 262.69 LBS | OXYGEN SATURATION: 97 % | RESPIRATION RATE: 18 BRPM | HEIGHT: 66 IN | TEMPERATURE: 97 F | HEART RATE: 85 BPM | SYSTOLIC BLOOD PRESSURE: 112 MMHG | DIASTOLIC BLOOD PRESSURE: 66 MMHG | BODY MASS INDEX: 42.22 KG/M2

## 2025-03-24 DIAGNOSIS — R22.42 MASS OF LEG, LEFT: Primary | ICD-10-CM

## 2025-03-24 PROCEDURE — 3074F SYST BP LT 130 MM HG: CPT | Mod: CPTII,S$GLB,, | Performed by: REGISTERED NURSE

## 2025-03-24 PROCEDURE — 99213 OFFICE O/P EST LOW 20 MIN: CPT | Mod: S$GLB,,, | Performed by: REGISTERED NURSE

## 2025-03-24 PROCEDURE — 3044F HG A1C LEVEL LT 7.0%: CPT | Mod: CPTII,S$GLB,, | Performed by: REGISTERED NURSE

## 2025-03-24 PROCEDURE — 3078F DIAST BP <80 MM HG: CPT | Mod: CPTII,S$GLB,, | Performed by: REGISTERED NURSE

## 2025-03-24 PROCEDURE — 3066F NEPHROPATHY DOC TX: CPT | Mod: CPTII,S$GLB,, | Performed by: REGISTERED NURSE

## 2025-03-24 PROCEDURE — 3061F NEG MICROALBUMINURIA REV: CPT | Mod: CPTII,S$GLB,, | Performed by: REGISTERED NURSE

## 2025-03-24 PROCEDURE — 3008F BODY MASS INDEX DOCD: CPT | Mod: CPTII,S$GLB,, | Performed by: REGISTERED NURSE

## 2025-03-24 PROCEDURE — 3072F LOW RISK FOR RETINOPATHY: CPT | Mod: CPTII,S$GLB,, | Performed by: REGISTERED NURSE

## 2025-03-24 PROCEDURE — 1159F MED LIST DOCD IN RCRD: CPT | Mod: CPTII,S$GLB,, | Performed by: REGISTERED NURSE

## 2025-03-24 PROCEDURE — 99999 PR PBB SHADOW E&M-EST. PATIENT-LVL V: CPT | Mod: PBBFAC,,, | Performed by: REGISTERED NURSE

## 2025-03-24 NOTE — PROGRESS NOTES
Jenna Ovalle  MRN:  [id number]  54 y.o. female      Patient Care Team:  Franca Richmond MD as PCP - General (Family Medicine)  Sultana Daily MD (Obstetrics and Gynecology)  Haroldo Marks MD (Cardiology)  Bonny Griffin FNP-C as Nurse Practitioner (Family Medicine)      SUBJECTIVE:     CHIEF COMPLAINT:  - Knot to left leg    HPI:  Ms. Ovalle reports a tender area to her left medial thigh, which she describes as feeling like adherent tissue or possibly a fatty nodule. The area is slightly tender with palpation, but less so than previously. She describes it as being completely under the skin without any bulging. She relates these symptoms to recent weight loss, mentioning that her doctor advised her to lose 30 lbs in 3 months. She reports able to feel things now in light of the weight loss.        Review of Systems   Constitutional:  Positive for weight loss.   HENT:  Negative for hearing loss.    Eyes:  Negative for discharge.   Respiratory:  Negative for wheezing.    Cardiovascular:  Negative for chest pain and palpitations.   Gastrointestinal:  Negative for blood in stool, constipation, diarrhea and vomiting.   Genitourinary:  Negative for dysuria and hematuria.   Musculoskeletal:  Negative for neck pain.   Skin:         + lump/knot LT leg   Neurological:  Negative for weakness and headaches.   Endo/Heme/Allergies:  Negative for polydipsia.       Review of patient's allergies indicates:   Allergen Reactions    No known drug allergies          Problem List[1]      Current Outpatient Medications   Medication Instructions    acetaminophen (TYLENOL) 500 mg, Every 6 hours PRN    aspirin (ECOTRIN) 81 mg, Daily    calcium carbonate (CALCIUM 500) 500 mg calcium (1,250 mg) chewable tablet     cetirizine (ZYRTEC) 10 MG tablet Daily PRN    cholecalciferol, vitamin D3, (VITAMIN D3) 125 mcg (5,000 unit) Tab     cyanocobalamin, vitamin B-12, (VITAMIN B-12 ORAL)     docusate sodium (COLACE CLEAR) 50 MG capsule      "ergocalciferol (ERGOCALCIFEROL) 50,000 Units, Oral, Every 7 days    iron, carbonyl 45 mg Tab     MULTIVITAMIN ORAL     nystatin (MYCOSTATIN) powder Topical (Top), 2 times daily, One bottle    ondansetron (ZOFRAN-ODT) 4 mg, Every 8 hours PRN    pantoprazole sodium (PANTOPRAZOLE ORAL) 40 mg    polyethylene glycol (GLYCOLAX) 17 g    temazepam (RESTORIL) 15 mg    thiamine (VITAMIN B-1) 50 MG tablet     traMADoL (ULTRAM) 50 mg, Every 6 hours PRN    UNABLE TO FIND     vit A/vit C/vit E/zinc/copper (VITAMINS A,C,E-ZINC-COPPER ORAL)          Past medical, surgical, family and social histories have been reviewed today.      OBJECTIVE:     Vitals:    03/24/25 1644   BP: 112/66   Pulse: 85   Resp: 18   Temp: 97.1 °F (36.2 °C)   TempSrc: Tympanic   SpO2: 97%   Weight: 119.2 kg (262 lb 10.9 oz)   Height: 5' 6" (1.676 m)     Vitals:    03/24/25 1644   PainSc: 0-No pain       Wt Readings from Last 4 Encounters:   03/24/25 119.2 kg (262 lb 10.9 oz)   01/14/25 124.1 kg (273 lb 9.5 oz)   12/05/24 128 kg (282 lb 3 oz)   09/10/24 (!) 143.6 kg (316 lb 9.3 oz)       Physical Exam  Vitals reviewed.   Constitutional:       General: She is not in acute distress.  Musculoskeletal:        Legs:       Comments: Nontender, palpable knot/lump, mobile   Neurological:      Mental Status: She is alert and oriented to person, place, and time.      Sensory: No sensory deficit.      Motor: No weakness.      Coordination: Coordination normal.      Gait: Gait normal.   Psychiatric:         Mood and Affect: Mood normal.         Behavior: Behavior normal.         Thought Content: Thought content normal.         Judgment: Judgment normal.         ASSESSMENT:     1. Mass of leg, left ----- nontender palpable lump, likely a lipoma.  Reassurance provided, get US if needed.  Monitor.      PLAN:     As above.  RTC as directed and/or prn.        YAHIR Campbell  [facility]       Future Appointments   Date Time Provider " Department Center   5/22/2025  9:10 AM Sultana Daily MD [facility] [place] [facility]   7/31/2025  7:40 AM Franca Richmond MD [facility]   12/2/2025  2:20 PM Edgard Olson, OD [facility]            [1]   Patient Active Problem List  Diagnosis    Essential hypertension    AR (allergic rhinitis)    History of pulmonary embolism    Morbid obesity with BMI of 40.0-44.9, adult    Arthritis of back    Prediabetes    Arthritis of knee    Vitamin D deficiency    Exotropia of left eye    Thoracic radiculitis    Thoracic back pain    Uterine leiomyoma    Postmenopausal bleeding    Adjustment disorder with mixed anxiety and depressed mood    Postmenopausal    Family history of malignant neoplasm of breast    Obstructive sleep apnea of adult    Mass of upper outer quadrant of right breast    Mastodynia of right breast

## 2025-05-06 ENCOUNTER — TELEPHONE (OUTPATIENT)
Dept: PHARMACY | Facility: CLINIC | Age: 55
End: 2025-05-06
Payer: COMMERCIAL

## 2025-05-06 NOTE — TELEPHONE ENCOUNTER
[facility]/Population Health Chart Review & Patient Outreach Details For Medication Adherence Project    Reason for Outreach Encounter: 3rd Party payor non-compliance report ([employer], [employer], [employer], etc)  2.  Patient Outreach Method: Reviewed patient chart   3.   Medication in question:      Ozempic dc'ed      4.  Reviewed and or Updates Made To: Patient Chart  5. Outreach Outcomes and/or actions taken: Medication discontinued  Additional Notes:

## 2025-06-12 ENCOUNTER — PATIENT MESSAGE (OUTPATIENT)
Dept: DERMATOLOGY | Facility: CLINIC | Age: 55
End: 2025-06-12
Payer: COMMERCIAL

## 2025-06-18 ENCOUNTER — TELEPHONE (OUTPATIENT)
Dept: PHARMACY | Facility: CLINIC | Age: 55
End: 2025-06-18
Payer: COMMERCIAL

## 2025-07-13 DIAGNOSIS — E55.9 VITAMIN D DEFICIENCY: ICD-10-CM

## 2025-07-13 NOTE — TELEPHONE ENCOUNTER
Care Due:                  Date            Visit Type   Department     Provider  --------------------------------------------------------------------------------                                EP -                              PRIMARY      [facility]  Last Visit: 01-      CARE ([facility])   MEDICINE       Franca Richmond                              EP -                              PRIMARY      [facility]  Next Visit: 07-      CARE ([facility])   MEDICINE       Franca Richmond                                                            Last  Test          Frequency    Reason                     Performed    Due Date  --------------------------------------------------------------------------------    Vitamin D...  12 months..  ergocalciferol...........  Not Found    Overdue    [employer] Embedded Care Due Messages. Reference number: [id number].   7/13/2025 3:56:59 AM CDT

## 2025-07-14 NOTE — TELEPHONE ENCOUNTER
Refill Routing Note   Medication(s) are not appropriate for processing by [facility] for the following reason(s):        Outside of protocol    [facility] action(s):  Route             Appointments  past 12m or future 3m with PCP    Date Provider   Last Visit   1/14/2025 Franca Richmond MD   Next Visit   7/31/2025 Franca Richmond MD   ED visits in past 90 days: 0        Note composed:11:44 AM 07/14/2025

## 2025-07-15 RX ORDER — ERGOCALCIFEROL 1.25 MG/1
50000 CAPSULE ORAL
Qty: 12 CAPSULE | Refills: 3 | Status: SHIPPED | OUTPATIENT
Start: 2025-07-15

## 2025-07-27 DIAGNOSIS — B37.2 YEAST DERMATITIS: ICD-10-CM

## 2025-07-28 RX ORDER — NYSTATIN TOPICAL POWDER 100000 U/G
POWDER TOPICAL 2 TIMES DAILY
Qty: 60 G | Refills: 6 | Status: SHIPPED | OUTPATIENT
Start: 2025-07-28

## 2025-07-28 NOTE — TELEPHONE ENCOUNTER
Refill Routing Note   Medication(s) are not appropriate for processing by [facility] for the following reason(s):        Outside of protocol    [facility] action(s):  Route               Appointments  past 12m or future 3m with PCP    Date Provider   Last Visit   1/14/2025 Franca Richmond MD   Next Visit   7/31/2025 Franca Richmond MD   ED visits in past 90 days: 0        Note composed:12:27 PM 07/28/2025

## 2025-07-31 ENCOUNTER — OFFICE VISIT (OUTPATIENT)
Dept: FAMILY MEDICINE | Facility: CLINIC | Age: 55
End: 2025-07-31
Attending: FAMILY MEDICINE
Payer: COMMERCIAL

## 2025-07-31 VITALS
SYSTOLIC BLOOD PRESSURE: 114 MMHG | RESPIRATION RATE: 18 BRPM | DIASTOLIC BLOOD PRESSURE: 78 MMHG | WEIGHT: 255.94 LBS | HEART RATE: 56 BPM | OXYGEN SATURATION: 99 % | TEMPERATURE: 98 F | HEIGHT: 66 IN | BODY MASS INDEX: 41.13 KG/M2

## 2025-07-31 DIAGNOSIS — Z78.0 POSTMENOPAUSAL: ICD-10-CM

## 2025-07-31 DIAGNOSIS — G47.33 OBSTRUCTIVE SLEEP APNEA OF ADULT: ICD-10-CM

## 2025-07-31 DIAGNOSIS — E66.01 MORBID OBESITY WITH BMI OF 40.0-44.9, ADULT: ICD-10-CM

## 2025-07-31 DIAGNOSIS — M17.10 ARTHRITIS OF KNEE: ICD-10-CM

## 2025-07-31 DIAGNOSIS — Z00.00 ANNUAL PHYSICAL EXAM: Primary | ICD-10-CM

## 2025-07-31 DIAGNOSIS — R73.03 PREDIABETES: ICD-10-CM

## 2025-07-31 DIAGNOSIS — Z23 NEED FOR TD VACCINE: ICD-10-CM

## 2025-07-31 DIAGNOSIS — E55.9 VITAMIN D DEFICIENCY: ICD-10-CM

## 2025-07-31 DIAGNOSIS — I10 ESSENTIAL HYPERTENSION: ICD-10-CM

## 2025-07-31 PROCEDURE — 99396 PREV VISIT EST AGE 40-64: CPT | Mod: 25,S$GLB,, | Performed by: FAMILY MEDICINE

## 2025-07-31 PROCEDURE — 3044F HG A1C LEVEL LT 7.0%: CPT | Mod: CPTII,S$GLB,, | Performed by: FAMILY MEDICINE

## 2025-07-31 PROCEDURE — 3066F NEPHROPATHY DOC TX: CPT | Mod: CPTII,S$GLB,, | Performed by: FAMILY MEDICINE

## 2025-07-31 PROCEDURE — 3078F DIAST BP <80 MM HG: CPT | Mod: CPTII,S$GLB,, | Performed by: FAMILY MEDICINE

## 2025-07-31 PROCEDURE — 1159F MED LIST DOCD IN RCRD: CPT | Mod: CPTII,S$GLB,, | Performed by: FAMILY MEDICINE

## 2025-07-31 PROCEDURE — 90471 IMMUNIZATION ADMIN: CPT | Mod: S$GLB,,, | Performed by: FAMILY MEDICINE

## 2025-07-31 PROCEDURE — 90714 TD VACC NO PRESV 7 YRS+ IM: CPT | Mod: S$GLB,,, | Performed by: FAMILY MEDICINE

## 2025-07-31 PROCEDURE — 99999 PR PBB SHADOW E&M-EST. PATIENT-LVL V: CPT | Mod: PBBFAC,,, | Performed by: FAMILY MEDICINE

## 2025-07-31 PROCEDURE — 3008F BODY MASS INDEX DOCD: CPT | Mod: CPTII,S$GLB,, | Performed by: FAMILY MEDICINE

## 2025-07-31 PROCEDURE — 3061F NEG MICROALBUMINURIA REV: CPT | Mod: CPTII,S$GLB,, | Performed by: FAMILY MEDICINE

## 2025-07-31 PROCEDURE — 3074F SYST BP LT 130 MM HG: CPT | Mod: CPTII,S$GLB,, | Performed by: FAMILY MEDICINE
